# Patient Record
Sex: MALE | Race: WHITE | NOT HISPANIC OR LATINO | Employment: OTHER
[De-identification: names, ages, dates, MRNs, and addresses within clinical notes are randomized per-mention and may not be internally consistent; named-entity substitution may affect disease eponyms.]

---

## 2017-01-05 ENCOUNTER — RECORDS - HEALTHEAST (OUTPATIENT)
Dept: ADMINISTRATIVE | Facility: OTHER | Age: 72
End: 2017-01-05

## 2017-01-31 ENCOUNTER — RECORDS - HEALTHEAST (OUTPATIENT)
Dept: ADMINISTRATIVE | Facility: OTHER | Age: 72
End: 2017-01-31

## 2017-02-01 ENCOUNTER — RECORDS - HEALTHEAST (OUTPATIENT)
Dept: ADMINISTRATIVE | Facility: OTHER | Age: 72
End: 2017-02-01

## 2017-02-14 ENCOUNTER — RECORDS - HEALTHEAST (OUTPATIENT)
Dept: ADMINISTRATIVE | Facility: OTHER | Age: 72
End: 2017-02-14

## 2017-03-01 ENCOUNTER — RECORDS - HEALTHEAST (OUTPATIENT)
Dept: ADMINISTRATIVE | Facility: OTHER | Age: 72
End: 2017-03-01

## 2017-04-12 ENCOUNTER — RECORDS - HEALTHEAST (OUTPATIENT)
Dept: ADMINISTRATIVE | Facility: OTHER | Age: 72
End: 2017-04-12

## 2017-05-23 ENCOUNTER — RECORDS - HEALTHEAST (OUTPATIENT)
Dept: GENERAL RADIOLOGY | Facility: CLINIC | Age: 72
End: 2017-05-23

## 2017-05-23 ENCOUNTER — OFFICE VISIT - HEALTHEAST (OUTPATIENT)
Dept: FAMILY MEDICINE | Facility: CLINIC | Age: 72
End: 2017-05-23

## 2017-05-23 DIAGNOSIS — K58.9 IBS (IRRITABLE BOWEL SYNDROME): ICD-10-CM

## 2017-05-23 DIAGNOSIS — M54.6 THORACIC BACK PAIN: ICD-10-CM

## 2017-05-23 DIAGNOSIS — K64.8 INTERNAL HEMORRHOIDS: ICD-10-CM

## 2017-05-23 DIAGNOSIS — N13.8 BPH WITH URINARY OBSTRUCTION: ICD-10-CM

## 2017-05-23 DIAGNOSIS — N40.1 BPH WITH URINARY OBSTRUCTION: ICD-10-CM

## 2017-05-23 DIAGNOSIS — M54.6 PAIN IN THORACIC SPINE: ICD-10-CM

## 2017-05-31 ENCOUNTER — RECORDS - HEALTHEAST (OUTPATIENT)
Dept: ADMINISTRATIVE | Facility: OTHER | Age: 72
End: 2017-05-31

## 2017-05-31 ENCOUNTER — HOSPITAL ENCOUNTER (OUTPATIENT)
Dept: PHYSICAL MEDICINE AND REHAB | Facility: CLINIC | Age: 72
Discharge: HOME | End: 2017-05-31
Attending: FAMILY MEDICINE

## 2017-05-31 DIAGNOSIS — M54.2 CERVICALGIA: ICD-10-CM

## 2017-05-31 DIAGNOSIS — R20.2 PARESTHESIA AND PAIN OF LEFT EXTREMITY: ICD-10-CM

## 2017-05-31 DIAGNOSIS — M79.609 PARESTHESIA AND PAIN OF LEFT EXTREMITY: ICD-10-CM

## 2017-05-31 DIAGNOSIS — E55.9 VITAMIN D DEFICIENCY: ICD-10-CM

## 2017-05-31 DIAGNOSIS — M79.18 MYOFASCIAL PAIN: ICD-10-CM

## 2017-05-31 DIAGNOSIS — M54.6 THORACIC BACK PAIN: ICD-10-CM

## 2017-05-31 DIAGNOSIS — R53.83 FATIGUE: ICD-10-CM

## 2017-05-31 ASSESSMENT — MIFFLIN-ST. JEOR: SCORE: 1612.81

## 2017-06-02 ENCOUNTER — AMBULATORY - HEALTHEAST (OUTPATIENT)
Dept: LAB | Facility: CLINIC | Age: 72
End: 2017-06-02

## 2017-06-02 DIAGNOSIS — E55.9 VITAMIN D DEFICIENCY: ICD-10-CM

## 2017-06-02 DIAGNOSIS — M54.6 THORACIC BACK PAIN: ICD-10-CM

## 2017-06-02 DIAGNOSIS — R20.2 PARESTHESIA AND PAIN OF LEFT EXTREMITY: ICD-10-CM

## 2017-06-02 DIAGNOSIS — M79.18 MYOFASCIAL PAIN: ICD-10-CM

## 2017-06-02 DIAGNOSIS — M79.609 PARESTHESIA AND PAIN OF LEFT EXTREMITY: ICD-10-CM

## 2017-06-02 DIAGNOSIS — R53.83 FATIGUE: ICD-10-CM

## 2017-06-02 DIAGNOSIS — M54.2 CERVICALGIA: ICD-10-CM

## 2017-06-05 ENCOUNTER — OFFICE VISIT - HEALTHEAST (OUTPATIENT)
Dept: PHYSICAL THERAPY | Facility: CLINIC | Age: 72
End: 2017-06-05

## 2017-06-05 DIAGNOSIS — M62.81 GENERALIZED MUSCLE WEAKNESS: ICD-10-CM

## 2017-06-05 DIAGNOSIS — M54.6 PAIN IN THORACIC SPINE: ICD-10-CM

## 2017-06-05 DIAGNOSIS — M54.2 NECK PAIN ON RIGHT SIDE: ICD-10-CM

## 2017-06-05 DIAGNOSIS — R29.3 ABNORMAL POSTURE: ICD-10-CM

## 2017-06-06 ENCOUNTER — RECORDS - HEALTHEAST (OUTPATIENT)
Dept: ADMINISTRATIVE | Facility: OTHER | Age: 72
End: 2017-06-06

## 2017-06-08 ENCOUNTER — OFFICE VISIT - HEALTHEAST (OUTPATIENT)
Dept: PHYSICAL THERAPY | Facility: CLINIC | Age: 72
End: 2017-06-08

## 2017-06-08 DIAGNOSIS — M54.6 PAIN IN THORACIC SPINE: ICD-10-CM

## 2017-06-08 DIAGNOSIS — M62.81 GENERALIZED MUSCLE WEAKNESS: ICD-10-CM

## 2017-06-08 DIAGNOSIS — M54.2 NECK PAIN ON RIGHT SIDE: ICD-10-CM

## 2017-06-08 DIAGNOSIS — R29.3 ABNORMAL POSTURE: ICD-10-CM

## 2017-06-12 ENCOUNTER — OFFICE VISIT - HEALTHEAST (OUTPATIENT)
Dept: PHYSICAL THERAPY | Facility: CLINIC | Age: 72
End: 2017-06-12

## 2017-06-12 DIAGNOSIS — M54.2 NECK PAIN ON RIGHT SIDE: ICD-10-CM

## 2017-06-12 DIAGNOSIS — M62.81 GENERALIZED MUSCLE WEAKNESS: ICD-10-CM

## 2017-06-12 DIAGNOSIS — M54.6 PAIN IN THORACIC SPINE: ICD-10-CM

## 2017-06-12 DIAGNOSIS — R29.3 ABNORMAL POSTURE: ICD-10-CM

## 2017-06-15 ENCOUNTER — OFFICE VISIT - HEALTHEAST (OUTPATIENT)
Dept: PHYSICAL THERAPY | Facility: CLINIC | Age: 72
End: 2017-06-15

## 2017-06-15 DIAGNOSIS — R29.3 ABNORMAL POSTURE: ICD-10-CM

## 2017-06-15 DIAGNOSIS — M54.6 PAIN IN THORACIC SPINE: ICD-10-CM

## 2017-06-15 DIAGNOSIS — M62.81 GENERALIZED MUSCLE WEAKNESS: ICD-10-CM

## 2017-06-15 DIAGNOSIS — M54.2 NECK PAIN ON RIGHT SIDE: ICD-10-CM

## 2017-06-19 ENCOUNTER — OFFICE VISIT - HEALTHEAST (OUTPATIENT)
Dept: PHYSICAL THERAPY | Facility: CLINIC | Age: 72
End: 2017-06-19

## 2017-06-19 DIAGNOSIS — M62.81 GENERALIZED MUSCLE WEAKNESS: ICD-10-CM

## 2017-06-19 DIAGNOSIS — M54.2 NECK PAIN ON RIGHT SIDE: ICD-10-CM

## 2017-06-19 DIAGNOSIS — R29.3 ABNORMAL POSTURE: ICD-10-CM

## 2017-06-19 DIAGNOSIS — M54.6 PAIN IN THORACIC SPINE: ICD-10-CM

## 2017-06-22 ENCOUNTER — HOSPITAL ENCOUNTER (OUTPATIENT)
Dept: PHYSICAL MEDICINE AND REHAB | Facility: CLINIC | Age: 72
Discharge: HOME | End: 2017-06-22
Attending: PHYSICIAN ASSISTANT

## 2017-06-22 ENCOUNTER — OFFICE VISIT - HEALTHEAST (OUTPATIENT)
Dept: PHYSICAL THERAPY | Facility: CLINIC | Age: 72
End: 2017-06-22

## 2017-06-22 DIAGNOSIS — M54.6 PAIN IN THORACIC SPINE: ICD-10-CM

## 2017-06-22 DIAGNOSIS — M79.18 MYOFASCIAL PAIN: ICD-10-CM

## 2017-06-22 DIAGNOSIS — M54.6 THORACIC BACK PAIN: ICD-10-CM

## 2017-06-22 DIAGNOSIS — M54.2 NECK PAIN ON RIGHT SIDE: ICD-10-CM

## 2017-06-22 DIAGNOSIS — R29.3 ABNORMAL POSTURE: ICD-10-CM

## 2017-06-22 DIAGNOSIS — R53.83 FATIGUE: ICD-10-CM

## 2017-06-22 DIAGNOSIS — M62.81 GENERALIZED MUSCLE WEAKNESS: ICD-10-CM

## 2017-06-22 DIAGNOSIS — E55.9 VITAMIN D DEFICIENCY: ICD-10-CM

## 2017-06-22 ASSESSMENT — MIFFLIN-ST. JEOR: SCORE: 1623.7

## 2017-07-01 ENCOUNTER — HOSPITAL ENCOUNTER (OUTPATIENT)
Dept: MRI IMAGING | Facility: HOSPITAL | Age: 72
Discharge: HOME | End: 2017-07-01
Attending: PHYSICIAN ASSISTANT

## 2017-07-01 DIAGNOSIS — M54.6 THORACIC BACK PAIN: ICD-10-CM

## 2017-07-05 ENCOUNTER — HOSPITAL ENCOUNTER (OUTPATIENT)
Dept: PHYSICAL MEDICINE AND REHAB | Facility: CLINIC | Age: 72
Discharge: HOME | End: 2017-07-05
Attending: PHYSICIAN ASSISTANT

## 2017-07-05 DIAGNOSIS — M54.2 CERVICALGIA: ICD-10-CM

## 2017-07-05 DIAGNOSIS — R53.83 FATIGUE: ICD-10-CM

## 2017-07-05 DIAGNOSIS — M54.6 THORACIC BACK PAIN: ICD-10-CM

## 2017-07-05 DIAGNOSIS — M79.18 MYOFASCIAL PAIN: ICD-10-CM

## 2017-07-05 ASSESSMENT — MIFFLIN-ST. JEOR: SCORE: 1627.78

## 2017-07-12 ENCOUNTER — RECORDS - HEALTHEAST (OUTPATIENT)
Dept: ADMINISTRATIVE | Facility: OTHER | Age: 72
End: 2017-07-12

## 2017-08-16 ENCOUNTER — OFFICE VISIT - HEALTHEAST (OUTPATIENT)
Dept: FAMILY MEDICINE | Facility: CLINIC | Age: 72
End: 2017-08-16

## 2017-08-16 ENCOUNTER — OFFICE VISIT - HEALTHEAST (OUTPATIENT)
Dept: PODIATRY | Age: 72
End: 2017-08-16

## 2017-08-16 ENCOUNTER — RECORDS - HEALTHEAST (OUTPATIENT)
Dept: GENERAL RADIOLOGY | Facility: CLINIC | Age: 72
End: 2017-08-16

## 2017-08-16 DIAGNOSIS — K21.9 GASTROESOPHAGEAL REFLUX DISEASE WITHOUT ESOPHAGITIS: ICD-10-CM

## 2017-08-16 DIAGNOSIS — N13.8 BPH WITH URINARY OBSTRUCTION: ICD-10-CM

## 2017-08-16 DIAGNOSIS — N40.1 BPH WITH URINARY OBSTRUCTION: ICD-10-CM

## 2017-08-16 DIAGNOSIS — M77.9 ENTHESOPATHY, UNSPECIFIED: ICD-10-CM

## 2017-08-16 DIAGNOSIS — M76.71 PERONEAL TENDONITIS, RIGHT: ICD-10-CM

## 2017-08-16 DIAGNOSIS — M77.50 ANKLE ENTHESOPATHY: ICD-10-CM

## 2017-08-16 DIAGNOSIS — M54.6 CHRONIC THORACIC BACK PAIN, UNSPECIFIED BACK PAIN LATERALITY: ICD-10-CM

## 2017-08-16 DIAGNOSIS — E55.9 VITAMIN D INSUFFICIENCY: ICD-10-CM

## 2017-08-16 DIAGNOSIS — G89.29 CHRONIC THORACIC BACK PAIN, UNSPECIFIED BACK PAIN LATERALITY: ICD-10-CM

## 2017-08-16 DIAGNOSIS — N18.3 CHRONIC KIDNEY DISEASE (CKD), STAGE 3 (MODERATE): ICD-10-CM

## 2017-08-17 ENCOUNTER — COMMUNICATION - HEALTHEAST (OUTPATIENT)
Dept: FAMILY MEDICINE | Facility: CLINIC | Age: 72
End: 2017-08-17

## 2017-08-24 ENCOUNTER — HOSPITAL ENCOUNTER (OUTPATIENT)
Dept: PHYSICAL MEDICINE AND REHAB | Facility: CLINIC | Age: 72
Discharge: HOME | End: 2017-08-24
Attending: PHYSICIAN ASSISTANT

## 2017-08-24 DIAGNOSIS — M54.6 THORACIC BACK PAIN: ICD-10-CM

## 2017-08-24 DIAGNOSIS — M47.814 FACET ARTHROPATHY, THORACIC: ICD-10-CM

## 2017-08-24 DIAGNOSIS — M79.18 MYOFASCIAL PAIN: ICD-10-CM

## 2017-08-24 ASSESSMENT — MIFFLIN-ST. JEOR: SCORE: 1636.85

## 2017-08-25 ENCOUNTER — HOSPITAL ENCOUNTER (OUTPATIENT)
Dept: MRI IMAGING | Facility: HOSPITAL | Age: 72
Discharge: HOME | End: 2017-08-25
Attending: PODIATRIST

## 2017-08-25 DIAGNOSIS — M77.9 ENTHESOPATHY, UNSPECIFIED: ICD-10-CM

## 2017-08-25 DIAGNOSIS — M77.50 ANKLE ENTHESOPATHY: ICD-10-CM

## 2017-08-29 ENCOUNTER — COMMUNICATION - HEALTHEAST (OUTPATIENT)
Dept: PALLIATIVE MEDICINE | Facility: OTHER | Age: 72
End: 2017-08-29

## 2017-08-29 ENCOUNTER — COMMUNICATION - HEALTHEAST (OUTPATIENT)
Dept: FAMILY MEDICINE | Facility: CLINIC | Age: 72
End: 2017-08-29

## 2017-08-30 ENCOUNTER — HOSPITAL ENCOUNTER (OUTPATIENT)
Dept: PALLIATIVE MEDICINE | Facility: OTHER | Age: 72
Discharge: HOME | End: 2017-08-30
Attending: PHYSICIAN ASSISTANT

## 2017-08-30 DIAGNOSIS — M54.6 THORACIC BACK PAIN: ICD-10-CM

## 2017-08-30 DIAGNOSIS — M47.814 FACET ARTHROPATHY, THORACIC: ICD-10-CM

## 2017-08-30 DIAGNOSIS — M79.18 MYOFASCIAL PAIN: ICD-10-CM

## 2017-08-30 DIAGNOSIS — M12.88 OTHER SPECIFIC ARTHROPATHIES, NOT ELSEWHERE CLASSIFIED, OTHER SPECIFIED SITE: ICD-10-CM

## 2017-08-30 ASSESSMENT — MIFFLIN-ST. JEOR: SCORE: 1636.85

## 2017-08-31 ENCOUNTER — COMMUNICATION - HEALTHEAST (OUTPATIENT)
Dept: PALLIATIVE MEDICINE | Facility: OTHER | Age: 72
End: 2017-08-31

## 2017-09-01 ENCOUNTER — RECORDS - HEALTHEAST (OUTPATIENT)
Dept: ADMINISTRATIVE | Facility: OTHER | Age: 72
End: 2017-09-01

## 2017-09-15 ENCOUNTER — COMMUNICATION - HEALTHEAST (OUTPATIENT)
Dept: PHYSICAL MEDICINE AND REHAB | Facility: CLINIC | Age: 72
End: 2017-09-15

## 2017-09-15 DIAGNOSIS — M47.819 FACET ARTHROPATHY OF SPINE: ICD-10-CM

## 2017-09-15 DIAGNOSIS — M79.10 MYALGIA: ICD-10-CM

## 2017-09-15 DIAGNOSIS — G89.29 CHRONIC PAIN: ICD-10-CM

## 2017-09-22 ENCOUNTER — COMMUNICATION - HEALTHEAST (OUTPATIENT)
Dept: PHYSICAL MEDICINE AND REHAB | Facility: CLINIC | Age: 72
End: 2017-09-22

## 2017-10-18 ENCOUNTER — RECORDS - HEALTHEAST (OUTPATIENT)
Dept: ADMINISTRATIVE | Facility: OTHER | Age: 72
End: 2017-10-18

## 2017-11-01 ENCOUNTER — RECORDS - HEALTHEAST (OUTPATIENT)
Dept: ADMINISTRATIVE | Facility: OTHER | Age: 72
End: 2017-11-01

## 2018-08-10 ENCOUNTER — RECORDS - HEALTHEAST (OUTPATIENT)
Dept: ADMINISTRATIVE | Facility: OTHER | Age: 73
End: 2018-08-10

## 2018-08-15 ENCOUNTER — RECORDS - HEALTHEAST (OUTPATIENT)
Dept: ADMINISTRATIVE | Facility: OTHER | Age: 73
End: 2018-08-15

## 2018-08-17 ENCOUNTER — OFFICE VISIT - HEALTHEAST (OUTPATIENT)
Dept: FAMILY MEDICINE | Facility: CLINIC | Age: 73
End: 2018-08-17

## 2018-08-17 ENCOUNTER — COMMUNICATION - HEALTHEAST (OUTPATIENT)
Dept: ADMINISTRATIVE | Facility: CLINIC | Age: 73
End: 2018-08-17

## 2018-08-17 DIAGNOSIS — E04.1 THYROID NODULE: ICD-10-CM

## 2018-08-17 DIAGNOSIS — R63.0 ANOREXIA: ICD-10-CM

## 2018-08-17 DIAGNOSIS — R07.89 POSTERIOR CHEST PAIN: ICD-10-CM

## 2018-08-17 LAB
ALBUMIN SERPL-MCNC: 4.5 G/DL (ref 3.5–5)
ALP SERPL-CCNC: 91 U/L (ref 45–120)
ALT SERPL W P-5'-P-CCNC: 23 U/L (ref 0–45)
ANION GAP SERPL CALCULATED.3IONS-SCNC: 14 MMOL/L (ref 5–18)
AST SERPL W P-5'-P-CCNC: 22 U/L (ref 0–40)
BASOPHILS # BLD AUTO: 0.1 THOU/UL (ref 0–0.2)
BASOPHILS NFR BLD AUTO: 1 % (ref 0–2)
BILIRUB SERPL-MCNC: 1.4 MG/DL (ref 0–1)
BUN SERPL-MCNC: 17 MG/DL (ref 8–28)
CALCIUM SERPL-MCNC: 10 MG/DL (ref 8.5–10.5)
CHLORIDE BLD-SCNC: 96 MMOL/L (ref 98–107)
CO2 SERPL-SCNC: 23 MMOL/L (ref 22–31)
CREAT SERPL-MCNC: 1.26 MG/DL (ref 0.7–1.3)
EOSINOPHIL # BLD AUTO: 0.2 THOU/UL (ref 0–0.4)
EOSINOPHIL NFR BLD AUTO: 2 % (ref 0–6)
ERYTHROCYTE [DISTWIDTH] IN BLOOD BY AUTOMATED COUNT: 13.1 % (ref 11–14.5)
GFR SERPL CREATININE-BSD FRML MDRD: 56 ML/MIN/1.73M2
GLUCOSE BLD-MCNC: 106 MG/DL (ref 70–125)
HCT VFR BLD AUTO: 54.4 % (ref 40–54)
HGB BLD-MCNC: 19.3 G/DL (ref 14–18)
LIPASE SERPL-CCNC: 44 U/L (ref 0–52)
LYMPHOCYTES # BLD AUTO: 1.3 THOU/UL (ref 0.8–4.4)
LYMPHOCYTES NFR BLD AUTO: 13 % (ref 20–40)
MCH RBC QN AUTO: 31.6 PG (ref 27–34)
MCHC RBC AUTO-ENTMCNC: 35.5 G/DL (ref 32–36)
MCV RBC AUTO: 89 FL (ref 80–100)
MONOCYTES # BLD AUTO: 1.3 THOU/UL (ref 0–0.9)
MONOCYTES NFR BLD AUTO: 13 % (ref 2–10)
NEUTROPHILS # BLD AUTO: 6.8 THOU/UL (ref 2–7.7)
NEUTROPHILS NFR BLD AUTO: 71 % (ref 50–70)
PLATELET # BLD AUTO: 226 THOU/UL (ref 140–440)
PMV BLD AUTO: 10 FL (ref 8.5–12.5)
POTASSIUM BLD-SCNC: 4.4 MMOL/L (ref 3.5–5)
PROT SERPL-MCNC: 7.5 G/DL (ref 6–8)
RBC # BLD AUTO: 6.1 MILL/UL (ref 4.4–6.2)
SODIUM SERPL-SCNC: 133 MMOL/L (ref 136–145)
T3 SERPL-MCNC: 65 NG/DL (ref 45–175)
T4 FREE SERPL-MCNC: 1 NG/DL (ref 0.7–1.8)
TROPONIN I SERPL-MCNC: <0.01 NG/ML (ref 0–0.29)
TSH SERPL DL<=0.005 MIU/L-ACNC: 2.3 UIU/ML (ref 0.3–5)
WBC: 9.8 THOU/UL (ref 4–11)

## 2018-08-17 RX ORDER — FAMOTIDINE 10 MG
10 TABLET ORAL EVERY EVENING
Status: SHIPPED | COMMUNITY
Start: 2018-08-17

## 2018-08-20 ENCOUNTER — COMMUNICATION - HEALTHEAST (OUTPATIENT)
Dept: FAMILY MEDICINE | Facility: CLINIC | Age: 73
End: 2018-08-20

## 2018-08-21 ENCOUNTER — HOSPITAL ENCOUNTER (OUTPATIENT)
Dept: ULTRASOUND IMAGING | Facility: CLINIC | Age: 73
Discharge: HOME | End: 2018-08-21
Attending: FAMILY MEDICINE

## 2018-08-21 ENCOUNTER — COMMUNICATION - HEALTHEAST (OUTPATIENT)
Dept: TELEHEALTH | Facility: CLINIC | Age: 73
End: 2018-08-21

## 2018-08-21 DIAGNOSIS — E04.1 THYROID NODULE: ICD-10-CM

## 2018-08-22 ENCOUNTER — AMBULATORY - HEALTHEAST (OUTPATIENT)
Dept: FAMILY MEDICINE | Facility: CLINIC | Age: 73
End: 2018-08-22

## 2018-08-22 DIAGNOSIS — E04.1 THYROID NODULE: ICD-10-CM

## 2018-08-23 ENCOUNTER — COMMUNICATION - HEALTHEAST (OUTPATIENT)
Dept: FAMILY MEDICINE | Facility: CLINIC | Age: 73
End: 2018-08-23

## 2018-08-24 ENCOUNTER — OFFICE VISIT - HEALTHEAST (OUTPATIENT)
Dept: FAMILY MEDICINE | Facility: CLINIC | Age: 73
End: 2018-08-24

## 2018-08-24 DIAGNOSIS — R07.89 POSTERIOR CHEST PAIN: ICD-10-CM

## 2018-08-24 DIAGNOSIS — E04.1 THYROID NODULE: ICD-10-CM

## 2018-08-24 DIAGNOSIS — N28.9 RENAL INSUFFICIENCY: ICD-10-CM

## 2018-08-24 DIAGNOSIS — E78.5 HYPERLIPIDEMIA LDL GOAL <100: ICD-10-CM

## 2018-08-24 DIAGNOSIS — D75.1 POLYCYTHEMIA: ICD-10-CM

## 2018-08-24 DIAGNOSIS — E87.1 HYPONATREMIA: ICD-10-CM

## 2018-08-24 LAB
ANION GAP SERPL CALCULATED.3IONS-SCNC: 12 MMOL/L (ref 5–18)
BUN SERPL-MCNC: 17 MG/DL (ref 8–28)
CALCIUM SERPL-MCNC: 9 MG/DL (ref 8.5–10.5)
CHLORIDE BLD-SCNC: 109 MMOL/L (ref 98–107)
CHOLEST SERPL-MCNC: 228 MG/DL
CK SERPL-CCNC: 63 U/L (ref 30–190)
CO2 SERPL-SCNC: 21 MMOL/L (ref 22–31)
CREAT SERPL-MCNC: 1.22 MG/DL (ref 0.7–1.3)
ERYTHROCYTE [DISTWIDTH] IN BLOOD BY AUTOMATED COUNT: 11.7 % (ref 11–14.5)
FASTING STATUS PATIENT QL REPORTED: YES
GFR SERPL CREATININE-BSD FRML MDRD: 58 ML/MIN/1.73M2
GLUCOSE BLD-MCNC: 90 MG/DL (ref 70–125)
HCT VFR BLD AUTO: 46.1 % (ref 40–54)
HDLC SERPL-MCNC: 41 MG/DL
HGB BLD-MCNC: 15.7 G/DL (ref 14–18)
LDLC SERPL CALC-MCNC: 173 MG/DL
MCH RBC QN AUTO: 31.8 PG (ref 27–34)
MCHC RBC AUTO-ENTMCNC: 34.1 G/DL (ref 32–36)
MCV RBC AUTO: 93 FL (ref 80–100)
PLATELET # BLD AUTO: 252 THOU/UL (ref 140–440)
PMV BLD AUTO: 7 FL (ref 7–10)
POTASSIUM BLD-SCNC: 4.4 MMOL/L (ref 3.5–5)
RBC # BLD AUTO: 4.95 MILL/UL (ref 4.4–6.2)
SODIUM SERPL-SCNC: 142 MMOL/L (ref 136–145)
WBC: 8.1 THOU/UL (ref 4–11)

## 2018-08-26 ENCOUNTER — COMMUNICATION - HEALTHEAST (OUTPATIENT)
Dept: FAMILY MEDICINE | Facility: CLINIC | Age: 73
End: 2018-08-26

## 2018-09-04 ENCOUNTER — HOSPITAL ENCOUNTER (OUTPATIENT)
Dept: ULTRASOUND IMAGING | Facility: CLINIC | Age: 73
Discharge: HOME | End: 2018-09-04
Attending: FAMILY MEDICINE | Admitting: RADIOLOGY

## 2018-09-04 DIAGNOSIS — E04.1 THYROID NODULE: ICD-10-CM

## 2018-09-05 LAB
LAB AP CHARGES (HE HISTORICAL CONVERSION): NORMAL
LAB AP INITIAL CYTO EVAL (HE HISTORICAL CONVERSION): NORMAL
LAB MED GENERAL PATH INTERP (HE HISTORICAL CONVERSION): NORMAL
PATH REPORT.COMMENTS IMP SPEC: NORMAL
PATH REPORT.COMMENTS IMP SPEC: NORMAL
PATH REPORT.FINAL DX SPEC: NORMAL
PATH REPORT.MICROSCOPIC SPEC OTHER STN: NORMAL
PATH REPORT.RELEVANT HX SPEC: NORMAL
SPECIMEN DESCRIPTION: NORMAL

## 2018-09-06 ENCOUNTER — COMMUNICATION - HEALTHEAST (OUTPATIENT)
Dept: FAMILY MEDICINE | Facility: CLINIC | Age: 73
End: 2018-09-06

## 2019-02-26 ENCOUNTER — RECORDS - HEALTHEAST (OUTPATIENT)
Dept: ADMINISTRATIVE | Facility: OTHER | Age: 74
End: 2019-02-26

## 2019-06-12 ENCOUNTER — COMMUNICATION - HEALTHEAST (OUTPATIENT)
Dept: FAMILY MEDICINE | Facility: CLINIC | Age: 74
End: 2019-06-12

## 2019-06-12 ENCOUNTER — OFFICE VISIT - HEALTHEAST (OUTPATIENT)
Dept: FAMILY MEDICINE | Facility: CLINIC | Age: 74
End: 2019-06-12

## 2019-06-12 DIAGNOSIS — N28.9 RENAL INSUFFICIENCY: ICD-10-CM

## 2019-06-12 DIAGNOSIS — D75.1 POLYCYTHEMIA: ICD-10-CM

## 2019-06-12 DIAGNOSIS — Z01.810 PRE-OPERATIVE CARDIOVASCULAR EXAMINATION: ICD-10-CM

## 2019-06-12 DIAGNOSIS — H35.30 MACULAR DEGENERATION (SENILE) OF RETINA: ICD-10-CM

## 2019-06-12 DIAGNOSIS — K21.9 GASTROESOPHAGEAL REFLUX DISEASE WITHOUT ESOPHAGITIS: ICD-10-CM

## 2019-06-12 DIAGNOSIS — E78.5 HYPERLIPIDEMIA LDL GOAL <100: ICD-10-CM

## 2019-06-12 DIAGNOSIS — H54.3 DECREASED VISION IN BOTH EYES: ICD-10-CM

## 2019-06-12 DIAGNOSIS — E04.1 THYROID NODULE: ICD-10-CM

## 2019-06-12 LAB
ANION GAP SERPL CALCULATED.3IONS-SCNC: 9 MMOL/L (ref 5–18)
BUN SERPL-MCNC: 19 MG/DL (ref 8–28)
CALCIUM SERPL-MCNC: 9.7 MG/DL (ref 8.5–10.5)
CHLORIDE BLD-SCNC: 108 MMOL/L (ref 98–107)
CO2 SERPL-SCNC: 26 MMOL/L (ref 22–31)
CREAT SERPL-MCNC: 1.26 MG/DL (ref 0.7–1.3)
ERYTHROCYTE [DISTWIDTH] IN BLOOD BY AUTOMATED COUNT: 11.8 % (ref 11–14.5)
GFR SERPL CREATININE-BSD FRML MDRD: 56 ML/MIN/1.73M2
GLUCOSE BLD-MCNC: 90 MG/DL (ref 70–125)
HCT VFR BLD AUTO: 52.5 % (ref 40–54)
HGB BLD-MCNC: 17.3 G/DL (ref 14–18)
MCH RBC QN AUTO: 32 PG (ref 27–34)
MCHC RBC AUTO-ENTMCNC: 33 G/DL (ref 32–36)
MCV RBC AUTO: 97 FL (ref 80–100)
PLATELET # BLD AUTO: 248 THOU/UL (ref 140–440)
PMV BLD AUTO: 7.9 FL (ref 7–10)
POTASSIUM BLD-SCNC: 4.4 MMOL/L (ref 3.5–5)
RBC # BLD AUTO: 5.41 MILL/UL (ref 4.4–6.2)
SODIUM SERPL-SCNC: 143 MMOL/L (ref 136–145)
WBC: 8.9 THOU/UL (ref 4–11)

## 2019-06-12 ASSESSMENT — MIFFLIN-ST. JEOR: SCORE: 1626.65

## 2019-06-13 ENCOUNTER — COMMUNICATION - HEALTHEAST (OUTPATIENT)
Dept: FAMILY MEDICINE | Facility: CLINIC | Age: 74
End: 2019-06-13

## 2019-06-13 LAB
ATRIAL RATE - MUSE: 78 BPM
DIASTOLIC BLOOD PRESSURE - MUSE: NORMAL MMHG
INTERPRETATION ECG - MUSE: NORMAL
P AXIS - MUSE: 43 DEGREES
PR INTERVAL - MUSE: 168 MS
QRS DURATION - MUSE: 80 MS
QT - MUSE: 384 MS
QTC - MUSE: 437 MS
R AXIS - MUSE: -22 DEGREES
SYSTOLIC BLOOD PRESSURE - MUSE: NORMAL MMHG
T AXIS - MUSE: 19 DEGREES
VENTRICULAR RATE- MUSE: 78 BPM

## 2019-08-23 ENCOUNTER — RECORDS - HEALTHEAST (OUTPATIENT)
Dept: ADMINISTRATIVE | Facility: OTHER | Age: 74
End: 2019-08-23

## 2019-09-11 ENCOUNTER — OFFICE VISIT - HEALTHEAST (OUTPATIENT)
Dept: FAMILY MEDICINE | Facility: CLINIC | Age: 74
End: 2019-09-11

## 2019-09-11 DIAGNOSIS — H35.30 MACULAR DEGENERATION (SENILE) OF RETINA: ICD-10-CM

## 2019-09-11 DIAGNOSIS — N28.9 RENAL INSUFFICIENCY: ICD-10-CM

## 2019-09-11 DIAGNOSIS — K21.9 GASTROESOPHAGEAL REFLUX DISEASE WITHOUT ESOPHAGITIS: ICD-10-CM

## 2019-09-11 DIAGNOSIS — E04.1 THYROID NODULE: ICD-10-CM

## 2019-09-11 DIAGNOSIS — D75.1 POLYCYTHEMIA: ICD-10-CM

## 2019-09-11 DIAGNOSIS — Z00.01 ENCOUNTER FOR GENERAL ADULT MEDICAL EXAMINATION WITH ABNORMAL FINDINGS: ICD-10-CM

## 2019-09-11 DIAGNOSIS — E78.5 HYPERLIPIDEMIA LDL GOAL <100: ICD-10-CM

## 2019-09-11 LAB
ALBUMIN SERPL-MCNC: 4.2 G/DL (ref 3.5–5)
ALP SERPL-CCNC: 86 U/L (ref 45–120)
ALT SERPL W P-5'-P-CCNC: 18 U/L (ref 0–45)
ANION GAP SERPL CALCULATED.3IONS-SCNC: 11 MMOL/L (ref 5–18)
AST SERPL W P-5'-P-CCNC: 23 U/L (ref 0–40)
BILIRUB SERPL-MCNC: 0.8 MG/DL (ref 0–1)
BUN SERPL-MCNC: 13 MG/DL (ref 8–28)
CALCIUM SERPL-MCNC: 9.8 MG/DL (ref 8.5–10.5)
CHLORIDE BLD-SCNC: 107 MMOL/L (ref 98–107)
CHOLEST SERPL-MCNC: 265 MG/DL
CO2 SERPL-SCNC: 25 MMOL/L (ref 22–31)
CREAT SERPL-MCNC: 1.29 MG/DL (ref 0.7–1.3)
ERYTHROCYTE [DISTWIDTH] IN BLOOD BY AUTOMATED COUNT: 12.1 % (ref 11–14.5)
FASTING STATUS PATIENT QL REPORTED: YES
GFR SERPL CREATININE-BSD FRML MDRD: 54 ML/MIN/1.73M2
GLUCOSE BLD-MCNC: 98 MG/DL (ref 70–125)
HCT VFR BLD AUTO: 50.8 % (ref 40–54)
HDLC SERPL-MCNC: 53 MG/DL
HGB BLD-MCNC: 17.5 G/DL (ref 14–18)
LDLC SERPL CALC-MCNC: 182 MG/DL
MCH RBC QN AUTO: 32.8 PG (ref 27–34)
MCHC RBC AUTO-ENTMCNC: 34.4 G/DL (ref 32–36)
MCV RBC AUTO: 95 FL (ref 80–100)
PLATELET # BLD AUTO: 225 THOU/UL (ref 140–440)
PMV BLD AUTO: 7.7 FL (ref 7–10)
POTASSIUM BLD-SCNC: 4.1 MMOL/L (ref 3.5–5)
PROT SERPL-MCNC: 7.1 G/DL (ref 6–8)
RBC # BLD AUTO: 5.33 MILL/UL (ref 4.4–6.2)
SODIUM SERPL-SCNC: 143 MMOL/L (ref 136–145)
TRIGL SERPL-MCNC: 149 MG/DL
WBC: 8.5 THOU/UL (ref 4–11)

## 2019-09-11 ASSESSMENT — MIFFLIN-ST. JEOR: SCORE: 1627.78

## 2019-09-15 ENCOUNTER — COMMUNICATION - HEALTHEAST (OUTPATIENT)
Dept: FAMILY MEDICINE | Facility: CLINIC | Age: 74
End: 2019-09-15

## 2019-11-22 ENCOUNTER — COMMUNICATION - HEALTHEAST (OUTPATIENT)
Dept: FAMILY MEDICINE | Facility: CLINIC | Age: 74
End: 2019-11-22

## 2020-05-13 ENCOUNTER — RECORDS - HEALTHEAST (OUTPATIENT)
Dept: ADMINISTRATIVE | Facility: OTHER | Age: 75
End: 2020-05-13

## 2020-08-04 ENCOUNTER — COMMUNICATION - HEALTHEAST (OUTPATIENT)
Dept: TELEHEALTH | Facility: CLINIC | Age: 75
End: 2020-08-04

## 2020-08-04 ENCOUNTER — OFFICE VISIT - HEALTHEAST (OUTPATIENT)
Dept: FAMILY MEDICINE | Facility: CLINIC | Age: 75
End: 2020-08-04

## 2020-08-04 DIAGNOSIS — L70.0 BLACKHEAD: ICD-10-CM

## 2020-08-04 DIAGNOSIS — L82.1 SEBORRHEIC KERATOSES: ICD-10-CM

## 2020-08-04 DIAGNOSIS — N40.1 BPH WITH URINARY OBSTRUCTION: ICD-10-CM

## 2020-08-04 DIAGNOSIS — N13.8 BPH WITH URINARY OBSTRUCTION: ICD-10-CM

## 2020-08-04 DIAGNOSIS — L81.9 ATYPICAL PIGMENTED SKIN LESION: ICD-10-CM

## 2020-08-04 DIAGNOSIS — R20.2 ARM PARESTHESIA, LEFT: ICD-10-CM

## 2020-08-04 ASSESSMENT — MIFFLIN-ST. JEOR: SCORE: 1637.53

## 2020-08-12 ENCOUNTER — RECORDS - HEALTHEAST (OUTPATIENT)
Dept: ADMINISTRATIVE | Facility: OTHER | Age: 75
End: 2020-08-12

## 2020-08-18 ENCOUNTER — HOSPITAL ENCOUNTER (OUTPATIENT)
Dept: MRI IMAGING | Facility: HOSPITAL | Age: 75
Discharge: HOME | End: 2020-08-18
Attending: FAMILY MEDICINE

## 2020-08-18 DIAGNOSIS — R20.2 ARM PARESTHESIA, LEFT: ICD-10-CM

## 2020-08-19 ENCOUNTER — AMBULATORY - HEALTHEAST (OUTPATIENT)
Dept: FAMILY MEDICINE | Facility: CLINIC | Age: 75
End: 2020-08-19

## 2020-08-19 DIAGNOSIS — M48.02 NEURAL FORAMINAL STENOSIS OF CERVICAL SPINE: ICD-10-CM

## 2020-08-19 DIAGNOSIS — R20.2 ARM PARESTHESIA, LEFT: ICD-10-CM

## 2020-08-20 ENCOUNTER — COMMUNICATION - HEALTHEAST (OUTPATIENT)
Dept: FAMILY MEDICINE | Facility: CLINIC | Age: 75
End: 2020-08-20

## 2020-08-24 ENCOUNTER — COMMUNICATION - HEALTHEAST (OUTPATIENT)
Dept: SCHEDULING | Facility: CLINIC | Age: 75
End: 2020-08-24

## 2020-08-25 ENCOUNTER — AMBULATORY - HEALTHEAST (OUTPATIENT)
Dept: CARE COORDINATION | Facility: CLINIC | Age: 75
End: 2020-08-25

## 2020-08-25 DIAGNOSIS — A41.9 SEPSIS, DUE TO UNSPECIFIED ORGANISM, UNSPECIFIED WHETHER ACUTE ORGAN DYSFUNCTION PRESENT (H): ICD-10-CM

## 2020-08-26 ENCOUNTER — AMBULATORY - HEALTHEAST (OUTPATIENT)
Dept: SURGERY | Facility: HOSPITAL | Age: 75
End: 2020-08-26

## 2020-08-26 DIAGNOSIS — Z11.59 ENCOUNTER FOR SCREENING FOR OTHER VIRAL DISEASES: ICD-10-CM

## 2020-08-31 ENCOUNTER — COMMUNICATION - HEALTHEAST (OUTPATIENT)
Dept: FAMILY MEDICINE | Facility: CLINIC | Age: 75
End: 2020-08-31

## 2020-08-31 ENCOUNTER — COMMUNICATION - HEALTHEAST (OUTPATIENT)
Dept: NURSING | Facility: CLINIC | Age: 75
End: 2020-08-31

## 2020-09-08 ENCOUNTER — COMMUNICATION - HEALTHEAST (OUTPATIENT)
Dept: CARE COORDINATION | Facility: CLINIC | Age: 75
End: 2020-09-08

## 2020-09-08 ASSESSMENT — ACTIVITIES OF DAILY LIVING (ADL): DEPENDENT_IADLS:: INDEPENDENT

## 2020-09-10 ENCOUNTER — OFFICE VISIT - HEALTHEAST (OUTPATIENT)
Dept: FAMILY MEDICINE | Facility: CLINIC | Age: 75
End: 2020-09-10

## 2020-09-10 ENCOUNTER — COMMUNICATION - HEALTHEAST (OUTPATIENT)
Dept: FAMILY MEDICINE | Facility: CLINIC | Age: 75
End: 2020-09-10

## 2020-09-10 DIAGNOSIS — N30.00 ACUTE CYSTITIS WITHOUT HEMATURIA: ICD-10-CM

## 2020-09-10 DIAGNOSIS — Z01.818 PREOP GENERAL PHYSICAL EXAM: ICD-10-CM

## 2020-09-10 DIAGNOSIS — N13.8 BPH WITH URINARY OBSTRUCTION: ICD-10-CM

## 2020-09-10 DIAGNOSIS — K21.9 GASTROESOPHAGEAL REFLUX DISEASE WITHOUT ESOPHAGITIS: ICD-10-CM

## 2020-09-10 DIAGNOSIS — N40.1 BPH WITH URINARY OBSTRUCTION: ICD-10-CM

## 2020-09-10 LAB — HGB BLD-MCNC: 16.7 G/DL (ref 14–18)

## 2020-09-11 ENCOUNTER — HOSPITAL ENCOUNTER (OUTPATIENT)
Dept: PHYSICAL MEDICINE AND REHAB | Facility: CLINIC | Age: 75
Discharge: HOME | End: 2020-09-11
Attending: FAMILY MEDICINE

## 2020-09-11 ENCOUNTER — AMBULATORY - HEALTHEAST (OUTPATIENT)
Dept: LAB | Facility: CLINIC | Age: 75
End: 2020-09-11

## 2020-09-11 DIAGNOSIS — G56.03 BILATERAL CARPAL TUNNEL SYNDROME: ICD-10-CM

## 2020-09-11 DIAGNOSIS — Z11.59 ENCOUNTER FOR SCREENING FOR OTHER VIRAL DISEASES: ICD-10-CM

## 2020-09-11 DIAGNOSIS — R20.2 PARESTHESIAS: ICD-10-CM

## 2020-09-11 ASSESSMENT — MIFFLIN-ST. JEOR: SCORE: 1617.57

## 2020-09-13 ENCOUNTER — COMMUNICATION - HEALTHEAST (OUTPATIENT)
Dept: SCHEDULING | Facility: CLINIC | Age: 75
End: 2020-09-13

## 2020-09-15 ENCOUNTER — SURGERY - HEALTHEAST (OUTPATIENT)
Dept: SURGERY | Facility: HOSPITAL | Age: 75
End: 2020-09-15

## 2020-09-15 ENCOUNTER — ANESTHESIA - HEALTHEAST (OUTPATIENT)
Dept: SURGERY | Facility: HOSPITAL | Age: 75
End: 2020-09-15

## 2020-09-15 ASSESSMENT — MIFFLIN-ST. JEOR
SCORE: 1617.57
SCORE: 1620.75

## 2020-09-24 ENCOUNTER — RECORDS - HEALTHEAST (OUTPATIENT)
Dept: ADMINISTRATIVE | Facility: OTHER | Age: 75
End: 2020-09-24

## 2020-09-30 ENCOUNTER — COMMUNICATION - HEALTHEAST (OUTPATIENT)
Dept: NURSING | Facility: CLINIC | Age: 75
End: 2020-09-30

## 2020-10-08 ENCOUNTER — COMMUNICATION - HEALTHEAST (OUTPATIENT)
Dept: CARE COORDINATION | Facility: CLINIC | Age: 75
End: 2020-10-08

## 2020-10-08 ENCOUNTER — RECORDS - HEALTHEAST (OUTPATIENT)
Dept: ADMINISTRATIVE | Facility: OTHER | Age: 75
End: 2020-10-08

## 2020-10-14 ENCOUNTER — COMMUNICATION - HEALTHEAST (OUTPATIENT)
Dept: NURSING | Facility: CLINIC | Age: 75
End: 2020-10-14

## 2020-10-20 ENCOUNTER — COMMUNICATION - HEALTHEAST (OUTPATIENT)
Dept: CARE COORDINATION | Facility: CLINIC | Age: 75
End: 2020-10-20

## 2020-10-21 ENCOUNTER — COMMUNICATION - HEALTHEAST (OUTPATIENT)
Dept: SCHEDULING | Facility: CLINIC | Age: 75
End: 2020-10-21

## 2020-10-23 ENCOUNTER — RECORDS - HEALTHEAST (OUTPATIENT)
Dept: ADMINISTRATIVE | Facility: OTHER | Age: 75
End: 2020-10-23

## 2020-11-03 ENCOUNTER — COMMUNICATION - HEALTHEAST (OUTPATIENT)
Dept: NURSING | Facility: CLINIC | Age: 75
End: 2020-11-03

## 2020-11-05 ENCOUNTER — COMMUNICATION - HEALTHEAST (OUTPATIENT)
Dept: CARE COORDINATION | Facility: CLINIC | Age: 75
End: 2020-11-05

## 2020-11-17 ENCOUNTER — COMMUNICATION - HEALTHEAST (OUTPATIENT)
Dept: NURSING | Facility: CLINIC | Age: 75
End: 2020-11-17

## 2020-11-24 ENCOUNTER — COMMUNICATION - HEALTHEAST (OUTPATIENT)
Dept: CARE COORDINATION | Facility: CLINIC | Age: 75
End: 2020-11-24

## 2020-11-24 DIAGNOSIS — A41.9 SEPSIS, DUE TO UNSPECIFIED ORGANISM, UNSPECIFIED WHETHER ACUTE ORGAN DYSFUNCTION PRESENT (H): ICD-10-CM

## 2020-11-25 ENCOUNTER — COMMUNICATION - HEALTHEAST (OUTPATIENT)
Dept: NURSING | Facility: CLINIC | Age: 75
End: 2020-11-25

## 2021-03-08 ENCOUNTER — RECORDS - HEALTHEAST (OUTPATIENT)
Dept: ADMINISTRATIVE | Facility: OTHER | Age: 76
End: 2021-03-08

## 2021-03-19 ENCOUNTER — RECORDS - HEALTHEAST (OUTPATIENT)
Dept: ADMINISTRATIVE | Facility: OTHER | Age: 76
End: 2021-03-19

## 2021-03-20 ENCOUNTER — RECORDS - HEALTHEAST (OUTPATIENT)
Dept: ADMINISTRATIVE | Facility: OTHER | Age: 76
End: 2021-03-20

## 2021-03-30 ENCOUNTER — RECORDS - HEALTHEAST (OUTPATIENT)
Dept: ADMINISTRATIVE | Facility: OTHER | Age: 76
End: 2021-03-30

## 2021-04-12 ENCOUNTER — RECORDS - HEALTHEAST (OUTPATIENT)
Dept: ADMINISTRATIVE | Facility: OTHER | Age: 76
End: 2021-04-12

## 2021-04-28 ENCOUNTER — RECORDS - HEALTHEAST (OUTPATIENT)
Dept: ADMINISTRATIVE | Facility: OTHER | Age: 76
End: 2021-04-28

## 2021-05-10 ENCOUNTER — RECORDS - HEALTHEAST (OUTPATIENT)
Dept: ADMINISTRATIVE | Facility: OTHER | Age: 76
End: 2021-05-10

## 2021-05-25 ENCOUNTER — OFFICE VISIT - HEALTHEAST (OUTPATIENT)
Dept: FAMILY MEDICINE | Facility: CLINIC | Age: 76
End: 2021-05-25

## 2021-05-25 DIAGNOSIS — R61 NIGHT SWEATS: ICD-10-CM

## 2021-05-25 DIAGNOSIS — N18.31 STAGE 3A CHRONIC KIDNEY DISEASE (H): ICD-10-CM

## 2021-05-25 DIAGNOSIS — K80.50 CHOLEDOCHOLITHIASIS: ICD-10-CM

## 2021-05-25 LAB
ALBUMIN SERPL-MCNC: 4.1 G/DL (ref 3.5–5)
ALP SERPL-CCNC: 121 U/L (ref 45–120)
ALT SERPL W P-5'-P-CCNC: 22 U/L (ref 0–45)
ANION GAP SERPL CALCULATED.3IONS-SCNC: 13 MMOL/L (ref 5–18)
AST SERPL W P-5'-P-CCNC: 20 U/L (ref 0–40)
BASOPHILS # BLD AUTO: 0.1 THOU/UL (ref 0–0.2)
BASOPHILS NFR BLD AUTO: 1 % (ref 0–2)
BILIRUB SERPL-MCNC: 0.6 MG/DL (ref 0–1)
BUN SERPL-MCNC: 13 MG/DL (ref 8–28)
C REACTIVE PROTEIN LHE: 0.5 MG/DL (ref 0–0.8)
CALCIUM SERPL-MCNC: 9.4 MG/DL (ref 8.5–10.5)
CHLORIDE BLD-SCNC: 105 MMOL/L (ref 98–107)
CO2 SERPL-SCNC: 23 MMOL/L (ref 22–31)
CREAT SERPL-MCNC: 1.18 MG/DL (ref 0.7–1.3)
EOSINOPHIL # BLD AUTO: 0.2 THOU/UL (ref 0–0.4)
EOSINOPHIL NFR BLD AUTO: 3 % (ref 0–6)
ERYTHROCYTE [DISTWIDTH] IN BLOOD BY AUTOMATED COUNT: 13.3 % (ref 11–14.5)
ERYTHROCYTE [SEDIMENTATION RATE] IN BLOOD BY WESTERGREN METHOD: 7 MM/HR (ref 0–15)
GFR SERPL CREATININE-BSD FRML MDRD: 60 ML/MIN/1.73M2
GLUCOSE BLD-MCNC: 91 MG/DL (ref 70–125)
HCT VFR BLD AUTO: 47.4 % (ref 40–54)
HGB BLD-MCNC: 16.1 G/DL (ref 14–18)
IMM GRANULOCYTES # BLD: 0 THOU/UL
IMM GRANULOCYTES NFR BLD: 1 %
LYMPHOCYTES # BLD AUTO: 1.9 THOU/UL (ref 0.8–4.4)
LYMPHOCYTES NFR BLD AUTO: 23 % (ref 20–40)
MCH RBC QN AUTO: 31.1 PG (ref 27–34)
MCHC RBC AUTO-ENTMCNC: 34 G/DL (ref 32–36)
MCV RBC AUTO: 92 FL (ref 80–100)
MONOCYTES # BLD AUTO: 1 THOU/UL (ref 0–0.9)
MONOCYTES NFR BLD AUTO: 12 % (ref 2–10)
NEUTROPHILS # BLD AUTO: 4.9 THOU/UL (ref 2–7.7)
NEUTROPHILS NFR BLD AUTO: 60 % (ref 50–70)
PLATELET # BLD AUTO: 241 THOU/UL (ref 140–440)
PMV BLD AUTO: 9.2 FL (ref 7–10)
POTASSIUM BLD-SCNC: 4.4 MMOL/L (ref 3.5–5)
PROT SERPL-MCNC: 7.1 G/DL (ref 6–8)
RBC # BLD AUTO: 5.18 MILL/UL (ref 4.4–6.2)
SODIUM SERPL-SCNC: 141 MMOL/L (ref 136–145)
TSH SERPL DL<=0.005 MIU/L-ACNC: 0.41 UIU/ML (ref 0.3–5)
WBC: 8.1 THOU/UL (ref 4–11)

## 2021-05-26 ENCOUNTER — RECORDS - HEALTHEAST (OUTPATIENT)
Dept: ADMINISTRATIVE | Facility: OTHER | Age: 76
End: 2021-05-26

## 2021-05-26 ENCOUNTER — COMMUNICATION - HEALTHEAST (OUTPATIENT)
Dept: INFECTIOUS DISEASES | Facility: CLINIC | Age: 76
End: 2021-05-26

## 2021-05-26 ENCOUNTER — COMMUNICATION - HEALTHEAST (OUTPATIENT)
Dept: FAMILY MEDICINE | Facility: CLINIC | Age: 76
End: 2021-05-26

## 2021-05-29 NOTE — TELEPHONE ENCOUNTER
Name of form/paperwork: pre op form  Have you been seen for this request: yes    When is form needed:         Fax form:  Fax Number:  On form     In Dr. Bermudez mailbox 6/12

## 2021-05-29 NOTE — PROGRESS NOTES
Preoperative Exam    Scheduled Procedure: bilateral laser - remove scar tissue  Surgery Date:  6/24/19  Surgery Location: [facility]    Surgeon:  Dr Morgan    Assessment/Plan:     1. Pre-operative cardiovascular examination  Preop cardiovascular examination completed.  No contraindication identified to scheduled procedure (presumed YAG capsulotomy).  - Electrocardiogram Perform and Read    2. Decreased vision in both eyes  Schedule procedure as noted above.  Status post bilateral cataract repair describes several years ago.    3. Hyperlipidemia LDL goal <100  History of hyperlipidemia.  He has not tolerated statin therapy.  Nonfasting today.  Will reassess at annual wellness visit in 3 months.    4. Renal insufficiency  Ensure stable renal function with history of mild renal insufficiency.  - Basic Metabolic Panel    5. Gastroesophageal reflux disease without esophagitis  Famotidine 10 mg typically once daily.  No other significant breakthrough symptoms.    6. Thyroid nodule  History of thyroid nodule.  Prior fine-needle aspiration without concern identified with benign colloid nodule reported.    7. Osteoarthritis Of The Knee  Bilateral knee osteoarthritis history.    8. Macular degeneration (senile) of retina  Macular degeneration described wet on left, dry on right and receives left intra-ocular injections.    9. Polycythemia  CBC with history of polycythemia.  - HM2(CBC w/o Differential)        Surgical Procedure Risk: Low (reported cardiac risk generally < 1%)  Have you had prior anesthesia?: Yes  Have you or any family members had a previous anesthesia reaction:  No  Do you or any family members have a history of a clotting or bleeding disorder?: No  Cardiac Risk Assessment: no increased risk for major cardiac complications    Patient approved for surgery with general or local anesthesia.    Please Note:  Patient is taking medications for Chronic Pain.  Patient has an implanted device.  Device Type: 2  total knee replacement, screws in right ankle      Device Vendor:       Functional Status: Independent  Patient plans to recover at home with family.     Subjective:      Cali Gama is a 74 y.o. male who presents for a preoperative consultation.  Decreased visual acuity.  Prior history of cataracts.  These were treated several years ago.  Patient with hyperlipidemia.  Does not tolerate statin therapy.  Mild renal insufficiency.  Uses famotidine as needed for reflux.  Prior thyroid nodule with biopsy showing benign findings.  Osteoarthritis bilateral knees.  Lower back pain improved with regular exercise.  Macular degeneration history.  Receives intraocular injection of left eye by eye specialist.  Denies chest pain or shortness of breath.  Denies other recent illness.  No palpitations.    All other systems reviewed and are negative, other than those listed in the HPI.    Pertinent History  Do you have difficulty breathing or chest pain after walking up a flight of stairs: No  History of obstructive sleep apnea: No  Steroid use in the last 6 months: No  Frequent Aspirin/NSAID use: No  Prior Blood Transfusion: No  Prior Blood Transfusion Reaction: No  If for some reason prior to, during or after the procedure, if it is medically indicated, would you be willing to have a blood transfusion?:  There is no transfusion refusal.    Current Outpatient Medications   Medication Sig Dispense Refill     acetaminophen (TYLENOL) 500 MG tablet Take 500 mg by mouth every 6 (six) hours as needed for pain.       diphenhydrAMINE (BENADRYL) 25 mg capsule Take 25-50 mg by mouth at bedtime as needed for itching.       famotidine (FOR PEPCID) 10 MG tablet Take 10 mg by mouth 2 (two) times a day.       ibuprofen (ADVIL,MOTRIN) 200 MG tablet Take 400 mg by mouth every 6 (six) hours as needed for pain.       diazePAM (VALIUM) 5 MG tablet Take 1 tablet (5 mg total) by mouth 2 (two) times a day. 4 tablet 0     HYDROcodone-acetaminophen 5-325  mg per tablet Take 1-2 tablets by mouth every 6 (six) hours as needed for pain. 30 tablet 0     No current facility-administered medications for this visit.         Allergies   Allergen Reactions     Morphine        Patient Active Problem List   Diagnosis     Irritable Bowel Syndrome     BPH with urinary obstruction     Hypertrophy Of Breast     Osteoarthritis Of The Knee     Joint Pain, Localized In The Left Shoulder     Blood Pressure Isolated Elevated     CKD (chronic kidney disease), stage 2 (mild)     GERD (gastroesophageal reflux disease)     Thyroid nodule       Past Medical History:   Diagnosis Date     BPH with urinary obstruction      Chronic kidney disease        Past Surgical History:   Procedure Laterality Date     CHOLECYSTECTOMY       JOINT REPLACEMENT         Social History     Socioeconomic History     Marital status:      Spouse name: Not on file     Number of children: Not on file     Years of education: Not on file     Highest education level: Not on file   Occupational History     Not on file   Social Needs     Financial resource strain: Not on file     Food insecurity:     Worry: Not on file     Inability: Not on file     Transportation needs:     Medical: Not on file     Non-medical: Not on file   Tobacco Use     Smoking status: Former Smoker     Smokeless tobacco: Never Used   Substance and Sexual Activity     Alcohol use: Yes     Alcohol/week: 1.2 oz     Types: 2 Standard drinks or equivalent per week     Drug use: No     Sexual activity: Not on file   Lifestyle     Physical activity:     Days per week: Not on file     Minutes per session: Not on file     Stress: Not on file   Relationships     Social connections:     Talks on phone: Not on file     Gets together: Not on file     Attends Restoration service: Not on file     Active member of club or organization: Not on file     Attends meetings of clubs or organizations: Not on file     Relationship status: Not on file     Intimate  "partner violence:     Fear of current or ex partner: Not on file     Emotionally abused: Not on file     Physically abused: Not on file     Forced sexual activity: Not on file   Other Topics Concern     Not on file   Social History Narrative     Not on file       Patient Care Team:  Urban Barker MD as PCP - General (Family Medicine)          Objective:     Vitals:    06/12/19 1449 06/12/19 1454 06/12/19 1536   BP: 140/80 140/90 136/84   Pulse: 83     SpO2: 96%     Weight: 202 lb (91.6 kg)     Height: 5' 9\" (1.753 m)           Physical Exam:  Physical Exam     General Appearance:    Alert, cooperative, no distress, appears stated age.  Overweight.   Head:    Normocephalic, without obvious abnormality, atraumatic   Eyes:    PERRL, conjunctiva/corneas clear, EOM's intact, fundi     benign, both eyes        Ears:    Normal TM's and external ear canals, both ears   Nose:   Nares normal, septum midline, mucosa normal, no drainage    or sinus tenderness   Throat:   Lips, mucosa, and tongue normal; teeth and gums normal   Neck:   Supple, symmetrical, trachea midline, no adenopathy;        thyroid:  No enlargement/tenderness/nodules; no carotid    bruit or JVD   Back:     Symmetric, no curvature, ROM normal, no CVA tenderness   Lungs:     Clear to auscultation bilaterally, respirations unlabored   Chest wall:    No tenderness or deformity   Heart:    Regular rate and rhythm, S1 and S2 normal, no murmur, rub   or gallop   Abdomen:     Soft, non-tender, bowel sounds active all four quadrants,     no masses, no organomegaly.     Genitalia:    deferred   Rectal:    deferred   Extremities:   Extremities normal, atraumatic, no cyanosis or edema   Pulses:   2+ and symmetric all extremities   Skin:   Skin color, texture, turgor normal, no rashes or lesions   Lymph nodes:   Cervical, supraclavicular, and axillary nodes normal   Neurologic:   CNII-XII intact. Normal strength, sensation and reflexes       throughout        There " are no Patient Instructions on file for this visit.    EKG:  Normal sinus rhythm   Inferior infarct , age undetermined   Abnormal ECG   When compared with ECG of 15-AUG-2018 20:42,   No significant change was found     Labs:  Recent Results (from the past 24 hour(s))   Electrocardiogram Perform and Read    Collection Time: 06/12/19  2:57 PM   Result Value Ref Range    SYSTOLIC BLOOD PRESSURE  mmHg    DIASTOLIC BLOOD PRESSURE  mmHg    VENTRICULAR RATE 78 BPM    ATRIAL RATE 78 BPM    P-R INTERVAL 168 ms    QRS DURATION 80 ms    Q-T INTERVAL 384 ms    QTC CALCULATION (BEZET) 437 ms    P Axis 43 degrees    R AXIS -22 degrees    T AXIS 19 degrees    MUSE DIAGNOSIS       Normal sinus rhythm  Inferior infarct , age undetermined  Abnormal ECG  When compared with ECG of 15-AUG-2018 20:42,  No significant change was found         Immunization History   Administered Date(s) Administered     Pneumo Conj 13-V (2010&after) 07/29/2015     Tdap 01/01/2013           1.  FINE-NEEDLE ASPIRATION RIGHT THYROID NODULE  2.  ULTRASOUND GUIDANCE  9/4/2018 1:52 PM     INDICATION: Nontoxic single thyroid nodule     PROCEDURE: Informed consent obtained. Time out performed. The site was prepped and draped in sterile fashion. 6 mL of 1% lidocaine was infused into the local soft tissues. Under direct ultrasound guidance, multiple fine-needle aspirates of the nodule   were obtained. The tissue was felt to be adequate by pathology.     RADIOLOGIC SUPERVISION AND INTERPRETATION:  ULTRASOUND GUIDANCE: Images demonstrate the needle within the nodule.     IMPRESSION:   CONCLUSION:  1.  Status post ultrasound-guided fine-needle aspiration of right thyroid nodule  Pathology showed:  RIGHT THYROID NODULE, FINE NEEDLE ASPIRATION WITH CELL BLOCK PREPARATION     - CONSISTENT WITH BENIGN COLLOID NODULE      US THYROID  8/21/2018 8:52 AM     INDICATION: Thyroid nodule seen on CT. Further characterization.  TECHNIQUE: Routine.  COMPARISON: CT  5/18/2018     FINDINGS:  RIGHT thyroid lobe measures 4.2 x 2.0 x 2.4 cm. Solitary nodule measures 1.9 x 1.8 cm mid gland.  Nodule: Mid gland nodule 1.9 cm.   Composition: Solid or almost completely solid 2   Echogenicity: Hyperechoic or isoechoic 1   Shape: Wider-than-tall 0   Margin: Smooth 0   Echogenic Foci: Punctate echoic foci 3   Point Total: 4-6 points. TI-RADS 4. FNA if >=1.5cm. Follow if >=1 cm.      LEFT thyroid lobe measures 3.5 x 1.4 x 1.7 cm. Single tiny nodule measures 8 x 6 mm should be of little significance.     Isthmus measures 3 mm. No additional findings.     No cervical lymphadenopathy.     IMPRESSION:   CONCLUSION:  1.  Solitary nodule mid right thyroid gland, TI-RADS 4, 1.9 cm. Consider fine-needle aspiration biopsy.     2.  No other significant findings.     TI-RADS 1. No FNA.  TI-RADS 2. No FNA.  TI-RADS 3. FNA if >=2.5 cm. Follow up ultrasound in 1 year >=1.5 cm.  TI-RADS 4. FNA if >=1.5cm. Follow up ultrasound in 1 year >=1 cm.  TI-RADS 5. FNA if >=1 cm. Follow up ultrasound in 1 year >=0.5 cm.      Electronically signed by Urabn Barker MD 06/12/19 2:44 PM

## 2021-05-31 VITALS — HEIGHT: 69 IN | WEIGHT: 200.7 LBS | BODY MASS INDEX: 29.73 KG/M2

## 2021-05-31 VITALS — BODY MASS INDEX: 30.08 KG/M2 | WEIGHT: 203.1 LBS | HEIGHT: 69 IN

## 2021-05-31 VITALS — BODY MASS INDEX: 30.87 KG/M2 | WEIGHT: 206 LBS

## 2021-05-31 VITALS — BODY MASS INDEX: 30.12 KG/M2 | WEIGHT: 201 LBS

## 2021-05-31 VITALS — BODY MASS INDEX: 30.21 KG/M2 | WEIGHT: 204 LBS | HEIGHT: 69 IN

## 2021-05-31 VITALS — BODY MASS INDEX: 30.51 KG/M2 | HEIGHT: 69 IN | WEIGHT: 206 LBS

## 2021-05-31 VITALS — WEIGHT: 206 LBS | HEIGHT: 69 IN | BODY MASS INDEX: 30.51 KG/M2

## 2021-06-01 VITALS — BODY MASS INDEX: 29.53 KG/M2 | WEIGHT: 200 LBS

## 2021-06-01 VITALS — WEIGHT: 199 LBS | BODY MASS INDEX: 29.39 KG/M2

## 2021-06-01 NOTE — PROGRESS NOTES
Assessment and Plan:       1. Encounter for general adult medical examination with abnormal findings  Annual wellness visit completed.  Risks associated with diet and the need for completion of advanced directives.  Declines flu shot otherwise immunizations up-to-date.  Annual wellness visits to continue.    2. Renal insufficiency  History of mild renal insufficiency with chronic kidney disease stage IIIa.  Repeat renal function.  Ensure adequate hydration.  - Comprehensive Metabolic Panel    3. Polycythemia  History of mild polycythemia.  Most recent CBC normal.  Repeat to ensure stable.  - HM2(CBC w/o Differential)    4. Hyperlipidemia LDL goal <100  History of hyperlipidemia.  Fasting.  Check lipid cascade today.  Dietary and exercise modification for weight goal less than 190 pounds initially, less than 185 pounds ideally.  - Lipid Cascade    5. Gastroesophageal reflux disease without esophagitis  GERD.  Famotidine 10 mg daily.  No breakthrough symptoms.  - Comprehensive Metabolic Panel    6. Thyroid nodule  History of thyroid nodule with prior biopsy confirming benign colloid nodule.  Her TSH normal.    7. Macular degeneration (senile) of retina  Macular degeneration.  Wet macular degeneration involving left eye and dry macular degeneration involving right eye.  Intraocular injections have been received previously.        The patient's current medical problems were reviewed.    I have had an Advance Directives discussion with the patient.  The following high BMI interventions were performed this visit: encouragement to exercise, weight monitoring, weight loss from baseline weight and lifestyle education regarding diet  Ensure ongoing efforts to achieve weight goal < 190 pounds initially, < 185 pounds ideally.       The following health maintenance schedule was reviewed with the patient and provided in printed form in the after visit summary:   Health Maintenance   Topic Date Due     ADVANCE CARE PLANNING   1961     ZOSTER VACCINES (1 of 2) 1993     MEDICARE ANNUAL WELLNESS VISIT  2008     PNEUMOCOCCAL POLYSACCHARIDE VACCINE AGE 65 AND OVER  2008     INFLUENZA VACCINE RULE BASED (1) 2019     FALL RISK ASSESSMENT  2020     TD 18+ HE  2023     PNEUMOCOCCAL CONJUGATE VACCINE FOR ADULTS (PCV13 OR PREVNAR)  Completed        Subjective:     Chief Complaint: Cali Gama is an 74 y.o. male here for an Annual Wellness visit.     HPI: In general doing well.  Hyperlipidemia, mild.  Diet controlled.  Mild renal insufficiency historically.  Reflux symptoms managed with famotidine 10 mg daily.  Benign colloid nodule of thyroid previously documented.  Bilateral knee replacements.  Prior TURP 2018 due to BPH without history of prostate cancer.  Denies recent illness.  No chest pain or palpitations.  Has had mildly elevated hemoglobin levels historically.  Macular degeneration bilateral eyes.    Review of Systems:  Please see above.  The rest of the review of systems are negative for all systems.    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 Navarro... [facility]   Lives in [place] x 6 months of the year   Hobbies: writing and weight lifting    Patient Care Team:  Urban Barker MD as PCP - General (Family Medicine)  Urban Barker MD as Assigned PCP     Patient Active Problem List   Diagnosis     Irritable Bowel Syndrome      BPH with urinary obstruction     Hypertrophy Of Breast     Osteoarthritis Of The Knee     Joint Pain, Localized In The Left Shoulder     Blood Pressure Isolated Elevated     Stage 2 chronic kidney disease     GERD (gastroesophageal reflux disease)     Thyroid nodule     Past Medical History:   Diagnosis Date     BPH with urinary obstruction      Chronic kidney disease       Past Surgical History:   Procedure Laterality Date     CHOLECYSTECTOMY       JOINT REPLACEMENT        Family History   Problem Relation Age of Onset     Cancer Father         bladder     Aneurysm Father      Dementia Mother      No Medical Problems Sister      No Medical Problems Daughter      No Medical Problems Maternal Grandmother      No Medical Problems Maternal Grandfather      No Medical Problems Paternal Grandmother      No Medical Problems Paternal Grandfather      No Medical Problems Maternal Aunt      No Medical Problems Paternal Aunt      BRCA 1/2 Neg Hx      Breast cancer Neg Hx      Colon cancer Neg Hx      Endometrial cancer Neg Hx      Ovarian cancer Neg Hx       Social History     Socioeconomic History     Marital status:      Spouse name: Not on file     Number of children: Not on file     Years of education: Not on file     Highest education level: Not on file   Occupational History     Not on file   Social Needs     Financial resource strain: Not on file     Food insecurity:     Worry: Not on file     Inability: Not on file     Transportation needs:     Medical: Not on file     Non-medical: Not on file   Tobacco Use     Smoking status: Former Smoker     Smokeless tobacco: Never Used   Substance and Sexual Activity     Alcohol use: Yes     Alcohol/week: 1.2 oz     Types: 2 Standard drinks or equivalent per week     Drug use: No     Sexual activity: Not on file   Lifestyle     Physical activity:     Days per week: Not on file     Minutes per session: Not on file     Stress: Not on file   Relationships     Social  "connections:     Talks on phone: Not on file     Gets together: Not on file     Attends Adventism service: Not on file     Active member of club or organization: Not on file     Attends meetings of clubs or organizations: Not on file     Relationship status: Not on file     Intimate partner violence:     Fear of current or ex partner: Not on file     Emotionally abused: Not on file     Physically abused: Not on file     Forced sexual activity: Not on file   Other Topics Concern     Not on file   Social History Narrative     Not on file      Current Outpatient Medications   Medication Sig Dispense Refill     acetaminophen (TYLENOL) 500 MG tablet Take 500 mg by mouth every 6 (six) hours as needed for pain.       diphenhydrAMINE (BENADRYL) 25 mg capsule Take 25-50 mg by mouth at bedtime as needed for itching.       famotidine (FOR PEPCID) 10 MG tablet Take 10 mg by mouth 2 (two) times a day.       ibuprofen (ADVIL,MOTRIN) 200 MG tablet Take 400 mg by mouth every 6 (six) hours as needed for pain.       No current facility-administered medications for this visit.       Objective:   Vital Signs:   Visit Vitals  /86   Pulse 78   Ht 5' 8.5\" (1.74 m)   Wt 204 lb (92.5 kg)   SpO2 97%   BMI 30.57 kg/m         VisionScreening:  No exam data present     PHYSICAL EXAM    General Appearance:    Alert, cooperative, no distress, appears stated age.  Obesity.   Head:    Normocephalic, without obvious abnormality, atraumatic   Eyes:    PERRL, conjunctiva/corneas clear, EOM's intact, fundi     benign, both eyes        Ears:    Normal TM's and external ear canals, both ears.  Cerumen removed from right external auditory canal with residual cerumen noted.  Patient declines ear lavage etc.   Nose:   Nares normal, septum midline, mucosa normal, no drainage    or sinus tenderness   Throat:   Lips, mucosa, and tongue normal; teeth and gums normal   Neck:   Supple, symmetrical, trachea midline, no adenopathy;        thyroid:  No " enlargement/tenderness/nodules; no carotid    bruit or JVD   Back:     Symmetric, no curvature, ROM normal, no CVA tenderness   Lungs:     Clear to auscultation bilaterally, respirations unlabored   Chest wall:    No tenderness or deformity   Heart:    Regular rate and rhythm, S1 and S2 normal, no murmur, rub   or gallop   Abdomen:     Soft, non-tender, bowel sounds active all four quadrants,     no masses, no organomegaly.     Genitalia:    deferred per patient   Rectal:    Normal tone.  Prostate moderately enlarged/symmetric, no masses or tenderness.   Extremities:   Extremities normal, atraumatic, no cyanosis or edema   Pulses:   2+ and symmetric all extremities   Skin:   Skin color, texture, turgor normal, no rashes or lesions   Lymph nodes:   Cervical, supraclavicular, and axillary nodes normal   Neurologic:   CNII-XII intact. Normal strength, sensation and reflexes       throughout        Assessment Results 9/11/2019   Activities of Daily Living No help needed   Instrumental Activities of Daily Living No help needed   Get Up and Go Score Less than 12 seconds   Mini Cog Total Score 4   Some recent data might be hidden     A Mini-Cog score of 0-2 suggests the possibility of dementia, score of 3-5 suggests no dementia    Identified Health Risks:     The patient was counseled and encouraged to consider modifying their diet and eating habits. He was provided with information on recommended healthy diet options.  Information regarding advance directives (living [place]), including where he can download the appropriate form, was provided to the patient via the AVS.

## 2021-06-03 ENCOUNTER — OFFICE VISIT - HEALTHEAST (OUTPATIENT)
Dept: INFECTIOUS DISEASES | Facility: CLINIC | Age: 76
End: 2021-06-03

## 2021-06-03 ENCOUNTER — AMBULATORY - HEALTHEAST (OUTPATIENT)
Dept: LAB | Facility: CLINIC | Age: 76
End: 2021-06-03

## 2021-06-03 VITALS — BODY MASS INDEX: 29.92 KG/M2 | HEIGHT: 69 IN | WEIGHT: 202 LBS

## 2021-06-03 VITALS
DIASTOLIC BLOOD PRESSURE: 86 MMHG | OXYGEN SATURATION: 97 % | HEIGHT: 69 IN | HEART RATE: 78 BPM | BODY MASS INDEX: 30.21 KG/M2 | WEIGHT: 204 LBS | SYSTOLIC BLOOD PRESSURE: 136 MMHG

## 2021-06-03 DIAGNOSIS — K68.9 RETROPERITONEAL INFECTION: ICD-10-CM

## 2021-06-03 DIAGNOSIS — R61 NIGHT SWEATS: ICD-10-CM

## 2021-06-03 LAB
ALBUMIN SERPL-MCNC: 4.4 G/DL (ref 3.5–5)
ALBUMIN UR-MCNC: NEGATIVE G/DL
ALP SERPL-CCNC: 126 U/L (ref 45–120)
ALT SERPL W P-5'-P-CCNC: 20 U/L (ref 0–45)
ANION GAP SERPL CALCULATED.3IONS-SCNC: 14 MMOL/L (ref 5–18)
APPEARANCE UR: CLEAR
AST SERPL W P-5'-P-CCNC: 20 U/L (ref 0–40)
BASOPHILS # BLD AUTO: 0.1 THOU/UL (ref 0–0.2)
BASOPHILS NFR BLD AUTO: 1 % (ref 0–2)
BILIRUB SERPL-MCNC: 1 MG/DL (ref 0–1)
BILIRUB UR QL STRIP: NEGATIVE
BUN SERPL-MCNC: 11 MG/DL (ref 8–28)
C REACTIVE PROTEIN LHE: 0.7 MG/DL (ref 0–0.8)
CALCIUM SERPL-MCNC: 9.7 MG/DL (ref 8.5–10.5)
CHLORIDE BLD-SCNC: 103 MMOL/L (ref 98–107)
CO2 SERPL-SCNC: 23 MMOL/L (ref 22–31)
COLOR UR AUTO: YELLOW
CREAT SERPL-MCNC: 1.21 MG/DL (ref 0.7–1.3)
EOSINOPHIL # BLD AUTO: 0.2 THOU/UL (ref 0–0.4)
EOSINOPHIL NFR BLD AUTO: 2 % (ref 0–6)
ERYTHROCYTE [DISTWIDTH] IN BLOOD BY AUTOMATED COUNT: 13.2 % (ref 11–14.5)
GFR SERPL CREATININE-BSD FRML MDRD: 58 ML/MIN/1.73M2
GLUCOSE BLD-MCNC: 102 MG/DL (ref 70–125)
GLUCOSE UR STRIP-MCNC: NEGATIVE MG/DL
HCT VFR BLD AUTO: 50.2 % (ref 40–54)
HGB BLD-MCNC: 17 G/DL (ref 14–18)
HGB UR QL STRIP: NEGATIVE
IMM GRANULOCYTES # BLD: 0 THOU/UL
IMM GRANULOCYTES NFR BLD: 1 %
KETONES UR STRIP-MCNC: NEGATIVE MG/DL
LEUKOCYTE ESTERASE UR QL STRIP: NEGATIVE
LIPASE SERPL-CCNC: 32 U/L (ref 0–52)
LYMPHOCYTES # BLD AUTO: 1.4 THOU/UL (ref 0.8–4.4)
LYMPHOCYTES NFR BLD AUTO: 19 % (ref 20–40)
MCH RBC QN AUTO: 30.9 PG (ref 27–34)
MCHC RBC AUTO-ENTMCNC: 33.9 G/DL (ref 32–36)
MCV RBC AUTO: 91 FL (ref 80–100)
MONOCYTES # BLD AUTO: 0.7 THOU/UL (ref 0–0.9)
MONOCYTES NFR BLD AUTO: 9 % (ref 2–10)
NEUTROPHILS # BLD AUTO: 5.1 THOU/UL (ref 2–7.7)
NEUTROPHILS NFR BLD AUTO: 68 % (ref 50–70)
NITRATE UR QL: NEGATIVE
PH UR STRIP: 6 [PH] (ref 5–8)
PLATELET # BLD AUTO: 257 THOU/UL (ref 140–440)
PMV BLD AUTO: 9.6 FL (ref 7–10)
POTASSIUM BLD-SCNC: 4.2 MMOL/L (ref 3.5–5)
PROT SERPL-MCNC: 7.4 G/DL (ref 6–8)
RBC # BLD AUTO: 5.51 MILL/UL (ref 4.4–6.2)
SODIUM SERPL-SCNC: 140 MMOL/L (ref 136–145)
SP GR UR STRIP: 1.02 (ref 1–1.03)
UROBILINOGEN UR STRIP-ACNC: NORMAL
WBC: 7.5 THOU/UL (ref 4–11)

## 2021-06-03 RX ORDER — CEFDINIR 300 MG/1
300 CAPSULE ORAL 2 TIMES DAILY
Qty: 28 CAPSULE | Refills: 0 | Status: SHIPPED | OUTPATIENT
Start: 2021-06-03 | End: 2021-06-17

## 2021-06-03 RX ORDER — TRIAMCINOLONE ACETONIDE 1 MG/G
CREAM TOPICAL
Status: SHIPPED | COMMUNITY
Start: 2020-10-08 | End: 2022-05-20

## 2021-06-03 RX ORDER — METRONIDAZOLE 500 MG/1
500 TABLET ORAL 3 TIMES DAILY
Qty: 42 TABLET | Refills: 0 | Status: SHIPPED | OUTPATIENT
Start: 2021-06-03 | End: 2021-06-17

## 2021-06-03 NOTE — TELEPHONE ENCOUNTER
Patient sent letter inquiring billing for date of service 9/11/19.    Spoke to patient today; I am looking into why this was not sent out to medicare.

## 2021-06-04 ENCOUNTER — COMMUNICATION - HEALTHEAST (OUTPATIENT)
Dept: INFECTIOUS DISEASES | Facility: CLINIC | Age: 76
End: 2021-06-04

## 2021-06-04 VITALS
WEIGHT: 200.1 LBS | DIASTOLIC BLOOD PRESSURE: 80 MMHG | OXYGEN SATURATION: 99 % | BODY MASS INDEX: 29.55 KG/M2 | SYSTOLIC BLOOD PRESSURE: 128 MMHG | HEART RATE: 74 BPM

## 2021-06-04 VITALS
WEIGHT: 204.4 LBS | DIASTOLIC BLOOD PRESSURE: 62 MMHG | SYSTOLIC BLOOD PRESSURE: 130 MMHG | HEART RATE: 80 BPM | HEIGHT: 69 IN | BODY MASS INDEX: 30.27 KG/M2

## 2021-06-04 NOTE — TELEPHONE ENCOUNTER
Spoke to billing today; requesting a coding review. I suspect a subsequent AWV code should have been applied instead of initial. Can take up to 30 days.

## 2021-06-05 VITALS — HEIGHT: 69 IN | WEIGHT: 200 LBS | BODY MASS INDEX: 29.62 KG/M2

## 2021-06-05 VITALS — WEIGHT: 200 LBS | BODY MASS INDEX: 29.62 KG/M2 | HEIGHT: 69 IN

## 2021-06-05 LAB
H CAPSUL AG UR QL IA: NOT DETECTED
H CAPSUL AG UR-MCNC: NOT DETECTED NG/ML

## 2021-06-07 ENCOUNTER — COMMUNICATION - HEALTHEAST (OUTPATIENT)
Dept: INFECTIOUS DISEASES | Facility: CLINIC | Age: 76
End: 2021-06-07

## 2021-06-07 LAB — COCCIDIOIDES AB TITR SER CF: NORMAL {TITER}

## 2021-06-08 ENCOUNTER — OFFICE VISIT - HEALTHEAST (OUTPATIENT)
Dept: FAMILY MEDICINE | Facility: CLINIC | Age: 76
End: 2021-06-08

## 2021-06-08 DIAGNOSIS — N18.31 STAGE 3A CHRONIC KIDNEY DISEASE (H): ICD-10-CM

## 2021-06-08 DIAGNOSIS — R61 NIGHT SWEATS: ICD-10-CM

## 2021-06-08 DIAGNOSIS — Z87.898 HX OF ABDOMINAL ABSCESS: ICD-10-CM

## 2021-06-08 LAB
AEROBIC BLOOD CULTURE BOTTLE: NO GROWTH
AEROBIC BLOOD CULTURE BOTTLE: NO GROWTH
ANAEROBIC BLOOD CULTURE BOTTLE: NO GROWTH
ANAEROBIC BLOOD CULTURE BOTTLE: NO GROWTH

## 2021-06-10 NOTE — TELEPHONE ENCOUNTER
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  I would recommend that he see our spine care clinic in order to review and discuss further treatment options in order to improve his symptoms.  I will place an order for [facility].  "

## 2021-06-10 NOTE — PROGRESS NOTES
Assessment/Plan:    1. BPH with urinary obstruction  BPH with urinary obstruction history status post prior TURP procedure February 5, 2018.  Is on tamsulosin 0.4 mg using 1 capsule twice daily apparently per urologist and [place] however would like to see local urologist.  Will refer patient to see Dr. Mario Reynolds with [facility] in order to continue ongoing management.  - Ambulatory referral to Urology  - tamsulosin (FLOMAX) 0.4 mg cap; Take 2 capsules (0.8 mg total) by mouth Daily after breakfast.  Dispense: 180 capsule; Refill: 0    2. Arm paresthesia, left  Left arm paresthesias intermittent typically worse after awakening in the morning.  Cervical MRI to rule out cervical radiculopathy.  - MR Cervical Spine Without Contrast; Future    3. Seborrheic keratoses  Pigmented seborrheic keratoses likely left lateral chest wall otherwise numerous seborrheic keratoses noted over trunk.    4. Blackhead  Right cheek pigmented lesion did use 11 blade to open and express likely retained oil.  Will monitor currently.    5. Atypical pigmented skin lesion  Did recommend dermatology skin check and referral was placed for follow-up pigmented skin lesion left lateral chest wall question seborrheic keratoses versus other plus numerous other skin lesions.  - Ambulatory referral to Dermatology          Subjective:    Cali TRACEE Gama is seen today for BPH with urinary obstruction.  Using tamsulosin 0.4 mg using 1 capsule twice daily.  Urologist and [place].  Had to have catheter in January apparently but he used for a month because of urinary retention issues.  Worried about having to have that happen again would like to follow-up with urology.  Had seen Dr. ward in the past however would like to see different urologist ideally.  Continues famotidine 10 mg twice daily for reflux.  CKD stage IIIa historically.  Skin lesion right anterior cheek is pigmented.  Seems that has been there for about a year.  Does not feel it is  an ingrown hair.  History of hypercholesterolemia with prior cholesterol 265 and  with HDL 53 in September 11, 2019.  Fasting glucose at that time was 98 with creatinine 1.29 GFR 54.  Comprehensive review of systems as above otherwise all negative.    Past Surgical History:   Procedure Laterality Date     CHOLECYSTECTOMY       JOINT REPLACEMENT          Family History   Problem Relation Age of Onset     Cancer Father         bladder     Aneurysm Father      Dementia Mother      No Medical Problems Sister      No Medical Problems Daughter      No Medical Problems Maternal Grandmother      No Medical Problems Maternal Grandfather      No Medical Problems Paternal Grandmother      No Medical Problems Paternal Grandfather      No Medical Problems Maternal Aunt      No Medical Problems Paternal Aunt      BRCA 1/2 Neg Hx      Breast cancer Neg Hx      Colon cancer Neg Hx      Endometrial cancer Neg Hx      Ovarian cancer Neg Hx         Past Medical History:   Diagnosis Date     BPH with urinary obstruction      Chronic kidney disease         Social History     Tobacco Use     Smoking status: Former Smoker     Smokeless tobacco: Never Used   Substance Use Topics     Alcohol use: Yes     Alcohol/week: 2.0 standard drinks     Types: 2 Standard drinks or equivalent per week     Drug use: No        Current Outpatient Medications   Medication Sig Dispense Refill     acetaminophen (TYLENOL) 500 MG tablet Take 500 mg by mouth every 6 (six) hours as needed for pain.       diphenhydrAMINE (BENADRYL) 25 mg capsule Take 25-50 mg by mouth at bedtime as needed for itching.       famotidine (FOR PEPCID) 10 MG tablet Take 10 mg by mouth 2 (two) times a day.       ibuprofen (ADVIL,MOTRIN) 200 MG tablet Take 400 mg by mouth every 6 (six) hours as needed for pain.       tamsulosin (FLOMAX) 0.4 mg cap Take 2 capsules (0.8 mg total) by mouth Daily after breakfast. 180 capsule 0     No current facility-administered medications for this  "visit.           Objective:    Vitals:    08/04/20 1403   BP: 130/62   Patient Site: Right Arm   Patient Position: Sitting   Cuff Size: Adult Large   Pulse: 80   Weight: 204 lb 6.4 oz (92.7 kg)   Height: 5' 9\" (1.753 m)      Body mass index is 30.18 kg/m .    Alert.  No apparent distress.  Chest clear.  Cardiac exam regular.  Left lateral chest wall with pigmented skin lesion likely pigmented seborrheic keratoses.  Right cheek also with darkened area question of ingrown hair versus blackhead appearance.  11 blade used to open this and express retained coil otherwise no signs of secondary infection.  Upper extremity CMS appears intact currently on exam with symmetric strength etc. noted.      This note has been dictated using voice recognition software and as a result may contain minor grammatical errors and unintended word substitutions.   "

## 2021-06-10 NOTE — PROGRESS NOTES
"Assessment:    1. IBS (irritable bowel syndrome)     2. Internal hemorrhoids     3. BPH with urinary obstruction     4. Thoracic back pain  Ambulatory referral to Spine Care    XR Thoracic Spine 2 VWS    XR Chest PA and Lateral         Plan:    Thoracic spine x-rays and chest x-ray were completed due to thoracic back pain issues.  Chest x-ray appears negative.  Degenerative changes however involving thoracic spine best seen on thoracic spine films with disc space narrowing significant at T3-T4 and T4-T5 likely.  Mild evidence of scoliosis on AP film.  Was referred to [facility] spine care for further evaluation and treatment options.  Also discussed IBS management with improvement noted.  Some perianal dermatitis.  Will continue Metamucil on a consistent basis.  Diagnostic anoscopy with internal hemorrhoids without bleeding.  Good anal tone identified without evidence for fecal leakage etc.  Patient also has elected to remain off finasteride for BPH management with history of gynecomastia associated with this.  We will see his urologist May 31, 2017 to review further treatment options.        Subjective:    Cali Gama is seen today for bowel and back issues.  History of IBS.  States that symptoms \"got away from me\" last summer.  Was not consuming enough roughage or Metamucil etc.  Has recently resumed Metamucil use and now symptoms have resolved.  Wondering about potential for rectal prolapse.  Has not had any significant bleeding.  Does have diverticulosis of sigmoid colon previously identified in colonoscopy March 28, 2011 told to repeat 10 year interval.  Using a hemorrhoid clear gel topically due to area of chafing and irritation described.  Previously on finasteride 5 mg daily however discontinued due to side effects of gynecomastia.  Patient is scheduled to see his urologist May 31, 2017 with history of BPH however current symptoms of prostatism remain in check following recent discontinuation of finasteride.  " Comprehensive review of systems as above otherwise all negative.    Remarried - Mira x 36 years   No children together   2 children from prior relationship ( when in his 20s)   Gmom - lived to 108   Mom - Marielle's (age 95)   Dad -  bladder CA (smoker and drinker)   No siblings   Work: retired   [employer] ([employer])   No smoke (age [age] perhaps 1 ppd, then quit)   EtOH: occ   Surgeries: bilateral knee replaced (right knee twice) ankle, hand, gallbladder   Hospitalizations: H/O enlarged prostate (prior biopsy x 2) - Dr. Navarro... [facility]   Lives in [place] x 6 months of the year   Hobbies: writing and weight lifting     Past Surgical History:   Procedure Laterality Date     CHOLECYSTECTOMY       JOINT REPLACEMENT          Family History   Problem Relation Age of Onset     Cancer Father      bladder     Aneurysm Father      Dementia Mother      No Medical Problems Sister      No Medical Problems Daughter      No Medical Problems Maternal Grandmother      No Medical Problems Maternal Grandfather      No Medical Problems Paternal Grandmother      No Medical Problems Paternal Grandfather      No Medical Problems Maternal Aunt      No Medical Problems Paternal Aunt      BRCA 1/2 Neg Hx      Breast cancer Neg Hx      Colon cancer Neg Hx      Endometrial cancer Neg Hx      Ovarian cancer Neg Hx         Past Medical History:   Diagnosis Date     BPH with urinary obstruction         Social History   Substance Use Topics     Smoking status: Former Smoker     Smokeless tobacco: None     Alcohol use Yes        Current Outpatient Prescriptions   Medication Sig Dispense Refill     famotidine (PEPCID) 20 MG tablet Take 20 mg by mouth 2 (two) times a day.       finasteride (PROSCAR) 5 mg tablet Take 5 mg by mouth daily.       No current facility-administered medications for this visit.           Objective:    Vitals:    17 1042   BP: 126/80   Pulse: 80   Weight: 201 lb (91.2 kg)      Body  mass index is 30.12 kg/(m^2).    Alert.  No apparent distress.  Thoracic spine appears midline without significant scoliosis.  Mild midline tenderness upper thoracic spine without significant paravertebral muscle spasm or tenderness noted.  Perianal exam today with mild chafing.  No external hemorrhoids.  Anal tone normal.  Prostate enlargement, symmetric without induration or nodularity.  Diagnostic endoscopy completed with internal hemorrhoids without active bleeding.  No rectal mass.

## 2021-06-10 NOTE — PROGRESS NOTES
ASSESSMENT:     Diagnoses and all orders for this visit:    Thoracic back pain  -     Vitamin D, Total (25-Hydroxy); Future  -     cyclobenzaprine (FLEXERIL) 5 MG tablet; Take 1 tablet (5 mg total) by mouth 3 (three) times a day as needed for muscle spasms.  Dispense: 30 tablet; Refill: 0  -     Ambulatory referral to Physical Therapy    Cervicalgia  -     Vitamin D, Total (25-Hydroxy); Future  -     cyclobenzaprine (FLEXERIL) 5 MG tablet; Take 1 tablet (5 mg total) by mouth 3 (three) times a day as needed for muscle spasms.  Dispense: 30 tablet; Refill: 0  -     Ambulatory referral to Physical Therapy    Myofascial pain  -     Vitamin D, Total (25-Hydroxy); Future  -     cyclobenzaprine (FLEXERIL) 5 MG tablet; Take 1 tablet (5 mg total) by mouth 3 (three) times a day as needed for muscle spasms.  Dispense: 30 tablet; Refill: 0  -     Ambulatory referral to Physical Therapy    Paresthesia and pain of left extremity  -     Vitamin D, Total (25-Hydroxy); Future  -     cyclobenzaprine (FLEXERIL) 5 MG tablet; Take 1 tablet (5 mg total) by mouth 3 (three) times a day as needed for muscle spasms.  Dispense: 30 tablet; Refill: 0  -     Ambulatory referral to Physical Therapy    Fatigue  -     Vitamin D, Total (25-Hydroxy); Future  -     cyclobenzaprine (FLEXERIL) 5 MG tablet; Take 1 tablet (5 mg total) by mouth 3 (three) times a day as needed for muscle spasms.  Dispense: 30 tablet; Refill: 0  -     Ambulatory referral to Physical Therapy    Vitamin D deficiency   -     Vitamin D, Total (25-Hydroxy); Future            Cali Gama is a 72 y.o. male  with a BMI of Body mass index is 30.07 kg/(m^2). who presents today in consultation at the request of Urban Barber MD  for new patient evaluation of chronic thoracic pain that is likely myofascial in nature, and acute right cervicalgia likely due to muscle sprain strain.  Patient has left wrist, hand numbness and tingling that could be due to cervical radiculopathy or  carpal tunnel syndrome.  Patient declines EMG testing or cervical MRI at this time. I  Recommend patient to start with physical therapy for the thoracic and the cervical spine.  I recommend  Flexeril 5 mg at nighttime for muscle spasm.  If his symptoms does not improve in 3-4 weeks with physical therapy and medication I recommend cervical MRI to evaluate for cervical radiculopathy to the left upper extremity.          ANN:  28  WHO-5:  19    PLAN:  A shared decision making model was used.  The patient's values and choices were respected.  The following represents what was discussed and decided upon by the physician and the patient.      1.  DIAGNOSTIC TESTS: No imaging at this time.  I reviewed the thoracic x-rays imaging and the report with the patient today.  2.  PHYSICAL THERAPY:  Discussed the importance of core strengthening, ROM, stretching exercises with the patient and how each of these entities is important in decreasing pain.  Explained to the patient that the purpose of physical therapy is to teach the patient a home exercise program.  These exercises need to be performed every day in order to decrease pain and prevent future occurrences of pain.  Likened it to brushing one's teeth.  Patient will start on physical therapy - [facility] program.   3.  MEDICATIONS: Patient will start on Flexeril 5 mg at nighttime for muscle spasm as needed.  Side effects of the medication discussed with the patient today.  4.  INTERVENTIONS: No therapeutic steroid injections at this time.   5.  PATIENT EDUCATION: I thoroughly discussed the plan with the patient today.  He verbalizes understanding and agrees with the plan.      FOLLOW-UP:   Patient will follow up with me in 3 weeks.  All questions were answered.      OSCAR Mary, MPA-C          SUBJECTIVE:  Cali Gama  Is a 72 y.o. male who presents today for new patient evaluation of thoracic pain.  Patient reports chronic thoracic pain for the last year.  Patient  stated that he has been trying to go back to the gym and lifting weights and noticed ongoing thoracic pain that is more pronounced in the mid line at the T8-T9 level.  He also reports intermittent right-sided neck pain that occasionally radiates proximally towards the base of the skull.  At this time his thoracic pain is worse than the neck pain.  He describes the thoracic pain as dull intermittent 8 out of 10 intensity pain.  His symptoms are aggravated by lifting weights, bending over, standing and rates it at 10 out of 10 intensity on its worst.  His symptoms are alleviated by rest and rates it at 2-3 out of 10 intensity on its best.  Patient states he does not like to take medication for pain.  He denies history of back surgeries, motor vehicle accidents or neck surgeries.  Patient reports occasionally numbness and tingling in the left hand that is more pronounced at nighttime.  He denies radicular pain to the left upper extremity.  He denies history of physical therapy.    Thoracic x-rays done on May 23 of 2017 shows slight wedging of the T9 and T10 vertebral bodies that is most likely chronic nature with anterior osteophyte formation.  There is degenerative disc disease involving the mid to lower thoracic disc spaces.    Patient denies urinary or bowel incontinence, unintentional weight loss, saddle anesthesia, fever or chills, balance difficulties.      Medications:    Current Outpatient Prescriptions   Medication Sig Dispense Refill     famotidine (PEPCID) 20 MG tablet Take 20 mg by mouth 2 (two) times a day.       finasteride (PROSCAR) 5 mg tablet Take 5 mg by mouth daily.       cyclobenzaprine (FLEXERIL) 5 MG tablet Take 1 tablet (5 mg total) by mouth 3 (three) times a day as needed for muscle spasms. 30 tablet 0     No current facility-administered medications for this encounter.        Allergies:   Allergies   Allergen Reactions     Morphine        PMH:    Past Medical History:   Diagnosis Date     BPH  with urinary obstruction        PSH:    Past Surgical History:   Procedure Laterality Date     CHOLECYSTECTOMY       JOINT REPLACEMENT         Family History:    Family History   Problem Relation Age of Onset     Cancer Father      bladder     Aneurysm Father      Dementia Mother      No Medical Problems Sister      No Medical Problems Daughter      No Medical Problems Maternal Grandmother      No Medical Problems Maternal Grandfather      No Medical Problems Paternal Grandmother      No Medical Problems Paternal Grandfather      No Medical Problems Maternal Aunt      No Medical Problems Paternal Aunt      BRCA 1/2 Neg Hx      Breast cancer Neg Hx      Colon cancer Neg Hx      Endometrial cancer Neg Hx      Ovarian cancer Neg Hx        Social History:   Social History     Social History     Marital status:      Spouse name: N/A     Number of children: N/A     Years of education: N/A     Occupational History     Not on file.     Social History Main Topics     Smoking status: Former Smoker     Smokeless tobacco: Not on file     Alcohol use Yes     Drug use: No     Sexual activity: Not on file     Other Topics Concern     Not on file     Social History Narrative       ROS: Thoracic pain, right-sided neck pain, occasional left hand numbness and tingling.  Specifically negative for bowel/bladder dysfunction, fevers,chills, appetite changes, unexplained weight loss.   Otherwise 13 systems reviewed are negative.  Please see the patient's intake questionnaire from today for details.      OBJECTIVE:  PHYSICAL EXAMINATION:    CONSTITUTIONAL:  Vital signs as above.  No acute distress.  The patient is well nourished and well groomed.  PSYCHIATRIC:  The patient is awake, alert, oriented to person, place, time and answering questions appropriately with clear speech.    SKIN:  Skin over the face, bilateral lower extremities, and posterior torso is clean, dry, intact without rashes.    GAIT:  Gait is non-antalgic.  The  patient is able to heel and toe walk without significant difficulty.    STANDING EXAMINATION: Tenderness present at the upper thoracic region bilaterally, and periscapular region as well.  Mild tenderness at the right upper trapezius.  MUSCLE STRENGTH:  The patient has 5/5 strength for the bilateral hip flexors, knee flexors/extensors, ankle dorsiflexors/plantar flexors, great toe extensors, ankle evertors/invertors.  NEUROLOGICAL:  2/4 patellar, medial hamstring, and achilles reflexes bilaterally.  Negative Babinski's bilaterally.  No ankle clonus bilaterally. Sensation to light touch is intact in the bilateral L4, L5, and S1 dermatomes.  VASCULAR:  2/4  pulses bilaterally.  Bilateral lower extremities are warm.  There is no pitting edema of the bilateral lower extremities.  ABDOMINAL:  Soft, non-distended, non-tender throughout all quadrants.  No pulsatile mass palpated in the left lower quadrant.  LYMPH NODES:  No palpable or tender inguinal/popliteal lymph nodes.  MUSCULOSKELETAL: Negative straight leg raise bilaterally.     USCULOSKELETAL: The patient has 5/5 strength for the bilateral shoulder abductors, elbow flexors/extensors, wrist extensors, finger flexors/abductors.   NEUROLOGICAL:  2  Biceps, brachioradialis, triceps reflexes bilaterally.  Negative Barreto's bilaterally.  Sensation to pinprick is intact.  Negative phalen test on the left wrist. Negative Tinel test on  Left wrist.       .      RESULTS:      XR THORACIC SPINE 2 VWS   5/23/2017 11:48 AM     INDICATION: Pain in thoracic spine.  COMPARISON: None.     FINDINGS: There is good anatomic alignment of the thoracic spine. The vertebral body heights are well-maintained throughout. There is slight wedging of the T9 and T10 vertebral bodies. This most likely is chronic in nature with anterior osteophyte   formation visualized. There is prominent degenerative disc disease noted involving the mid to lower thoracic disc spaces. These levels have a  moderate loss of disc space height, endplate changes and small anterior lateral osteophyte formations. Surgical   clips are noted within the right upper abdominal quadrant.

## 2021-06-11 NOTE — PATIENT INSTRUCTIONS - HE
Your symptoms seem most consistent with carpal tunnel syndrome.  It seems unlikely that a pinched nerve in your neck would be causing your symptoms, given that your symptoms typically only occur when you are trying to sleep.  A pinched nerve in your neck, would be expected to cause more constant pain during the day.    An EMG of your hands is ordered today to look for carpal tunnel syndrome or other signs of nerve damage.  You can schedule the EMG before you leave, or if you prefer to call us to schedule the EMG after you see how you are recovering from your surgery, you are welcome to call [phone] to schedule the EMG appointment.  You should schedule an appointment for follow-up with Dr. Jaramillo after the EMG to review the results and discuss treatment options.    If you would like to start treatment for presumed carpal tunnel syndrome, you can purchase over-the-counter wrist braces to wear at night while you sleep.  You can take the braces off during the day.    Please do not hesitate to contact the clinic at [phone] if you have any questions/concerns or any worsening of your symptoms.

## 2021-06-11 NOTE — PROGRESS NOTES
Assessment:     Diagnoses and all orders for this visit:    Thoracic back pain  -     MR Thoracic Spine Without Contrast; Future; Expected date: 6/22/17  -     naproxen (NAPROSYN) 500 MG tablet; Take 1 tablet (500 mg total) by mouth 2 (two) times a day with meals.  Dispense: 60 tablet; Refill: 1    Myofascial pain  -     naproxen (NAPROSYN) 500 MG tablet; Take 1 tablet (500 mg total) by mouth 2 (two) times a day with meals.  Dispense: 60 tablet; Refill: 1    Fatigue    Vitamin D deficiency           Cali Gama is a 72 y.o. y.o. male with past medical history significant for irritable bowel syndrome, benign prostatic hypertrophy, GERD, who presents today for follow-up regarding thoracic pain, and cervicalgia.  Patient reports improvement in neck pain however he still complains of thoracic pain that is worsened with physical therapy.  Patient symptoms of the thoracic pain are likely myofascial in nature and due to facet arthropathy.  We will obtain thoracic MRI to evaluate for the significance of the facet arthropathy and consider medial branch block.  I prescribed naproxen 500 mg to be taken 1 tablet twice daily for pain.  Patient will follow-up with his urologist to discuss the safety of being on naproxen with his kidney function.  Patient verbalizes understanding.  I recommend patient to continue with physical therapy.       Plan:     A shared decision making plan was used.  The patient's values and choices were respected.  The following represents what was discussed and decided upon by the physician and the patient.      1.  DIAGNOSTIC TESTS: I ordered a thoracic MRI.  2.  PHYSICAL THERAPY: Patient will continue on physical therapy.  3.  MEDICATIONS: Patient will start on naproxen 500 mg 1 tablet twice daily after consulting with his urologist.  Patient verbalizes understanding..   4.  INTERVENTIONS: We will consider medial branch block to the thoracic facets after reviewing the thoracic MRI.    5.  PATIENT  EDUCATION: I thoroughly discussed the plan with the patient today.  He verbalizes understanding and agrees with the plan.   6.  FOLLOW-UP: Patient will follow up with me in 1 weeks.   All questions were answered.      OSCAR Mary, MPA-C    Subjective:     Cali Gama is a 72 y.o. male who presents today for follow-up regarding chronic thoracic pain, and neck pain for the last year.  Today patient reports improvement in neck pain after physical therapy however he reports worsening of his thoracic pain with physical therapy.  He has been taking Advil 200 mg 2-3 tablets daily without any pain relief.  At this time he reports 4 out of 10 intensity pain in the upper thoracic region from T5 through T12 paraspinal muscle region.  His symptoms are aggravated by bending forward, extension of the thoracic spine and rates it at 6 out of 10 intensity on its worst.  He denies trauma to the thoracic region.  He denies radicular pain to the lower lumbar spine.  Patient still complains of occasional numbness and tingling in the left hand especially at nighttime.  Patient denies urinary or bowel incontinence, unintentional weight loss, saddle anesthesia, fever or chills, balance difficulties.      Review of Systems:  Thoracic pain.Patient denies urinary or bowel incontinence, unintentional weight loss, saddle anesthesia, fever or chills, balance difficulties.       Objective:   CONSTITUTIONAL:  Vital signs as above.  No acute distress.  The patient is well nourished and well groomed.    PSYCHIATRIC:  The patient is awake, alert, oriented to person, place and time.  The patient is answering questions appropriately with clear speech.  Normal affect.  SKIN:  Skin over the face, neck bilateral upper extremities is clean, dry, intact without rashes.  MUSCULOSKELETAL: The patient has 5/5 strength for the bilateral shoulder abductors, elbow flexors/extensors, wrist extensors, finger flexors/abductors.   NEUROLOGICAL:  2/4   Biceps, brachioradialis, triceps reflexes bilaterally.  Negative Barreto's bilaterally.  Sensation to pinprick is intact.  Negative phalen test on the left wrist. Negative Tinel test on  Left wrist.        RESULTS:    XR THORACIC SPINE 2 VWS   5/23/2017 11:48 AM      INDICATION: Pain in thoracic spine.  COMPARISON: None.      FINDINGS: There is good anatomic alignment of the thoracic spine. The vertebral body heights are well-maintained throughout. There is slight wedging of the T9 and T10 vertebral bodies. This most likely is chronic in nature with anterior osteophyte   formation visualized. There is prominent degenerative disc disease noted involving the mid to lower thoracic disc spaces. These levels have a moderate loss of disc space height, endplate changes and small anterior lateral osteophyte formations. Surgical   clips are noted within the right upper abdominal quadrant.

## 2021-06-11 NOTE — PROGRESS NOTES
ASSESSMENT: Cali Gama is a 75 y.o. male  with a BMI of 29.53 with past medical history significant for stage II chronic kidney disease, macular degeneration,  benign prostatic hypertrophy, irritable bowel syndrome, osteoarthritis  who presents today for new patient consultation at the request of Dr. Urban Barker regarding bilateral upper extremity paresthesias.  The paresthesias go from the elbows down to the hands and into the fingers, though he cannot state which fingers specifically.  Symptoms have been present for 6 months and are worsening.  They only affect him at night or when he is trying to sleep.  His symptoms are most consistent with carpal tunnel syndrome bilaterally.  The cervical foraminal stenosis seen on his MRI is likely incidental.  He is neurologically intact and without any red flag symptoms.    NDI:  2 %  WHO-5:  24 (The patient is not interested in behavioral health therapy)    PLAN:  A shared decision making model was used.  The patient's values and choices were respected.  The following represents what was discussed and decided upon by the physician and the patient.  His wife, Mira, was present for the entire encounter.    1.  DIAGNOSTIC TESTS: The patient's MRI of the cervical spine from August 18, 2020 was personally reviewed today by the physician with the physician performing her own interpretation.  Patient has left greater than right C2-3 facet arthropathy which does cause mild left C2-3 foraminal stenosis.  There is disc degeneration seen at the C3-4 level with left greater than right C3-4 facet arthropathy resulting in left greater than right C3-4 foraminal stenosis.  There is significant disc degeneration at the C4-5 and C5-6 levels.  Left greater than right facet arthropathy at the C4-5 level causing left greater than right C4-5 foraminal stenosis.  There is severe bilateral C5-6 foraminal stenosis.  There is moderate disc degeneration seen at the C6-7 level with bilateral  facet arthropathy and left and right foraminal stenosis.  There is facet arthropathy seen at the C7-T1 level.  There is mild central canal stenosis seen at the C3-4 and C5-6 levels, but no severe central canal stenosis at any level.  Cervical lordosis is largely maintained.  The cord signal is normal throughout.  The images were shown to the patient the findings were explained using a spine model.    -An EMG of the bilateral upper extremities today is ordered for further work-up regarding the paresthesias.  2.  PHYSICAL THERAPY:   Physical therapy was not ordered today, as his etiology of pain is thought to be carpal tunnel syndrome, and not from the cervical spine.  Can consider physical therapy in the future if etiology of pain points more to the cervical spine.  3.  MEDICATIONS: No medications were prescribed today, as patient really does not have pain, he only has paresthesias.  Gabapentin has not been shown to be effective in the literature for carpal tunnel syndrome.  Could consider this in the future if his sleep is significantly disrupted secondary to the paresthesias.  Would start with gabapentin 100 mg at bedtime (given his history of chronic kidney disease) and then titrate up to a maximum of 600 mg at bedtime if needed.  4.  INTERVENTIONS: Discussed with the patient that if he does have nerve damage secondary to carpal tunnel syndrome, that he may benefit from carpal tunnel corticosteroid injections.  Will await the results of the EMG before considering something like this.  -If he failed to have treatment for the carpal tunnel syndrome, then cervical epidural steroid injections could be considered.  However cervical radiculitis is very low on the differential diagnosis list.  5.  PATIENT EDUCATION:   -Discussed with the patient that his symptoms are most consistent with carpal tunnel syndrome.  Explained the carpal tunnel syndrome symptoms typically occur at night while sleeping.  Daytime activities  that aggravate carpal tunnel syndrome include working on the computer or driving.  Discussed that cervical radicular pain is typically very painful.  Patients typically do not only have paresthesia type symptoms.  Radicular pain also occurs mainly in the day with certain activities.  Patient denies any pain with activities during the day.  -Discussed that he could trial treatment for the carpal tunnel syndrome before proceeding with the EMG.  This would be wearing over-the-counter wrist splints/braces at bedtime while he sleeps.  He does not have to wear the braces during the day.  Would recommend wearing these for 4 to 6 weeks to see if symptoms improve.  He can wait to purchase these so if he would like, to see with the results of the EMG show.  -All of his and his wife's questions were answered to their satisfaction today.  They were in agreement with the treatment plan.  6.  FOLLOW-UP:   Patient follow-up first available for the EMG with Dr. Hernandez.  He is asked to follow-up with Dr. Jaramillo after the EMG to review the results and discuss treatment options at that time.  If there are any questions/concerns or any significant worsening of pain prior to that time, the patient is asked to call the clinic via the nurse navigation line or via Hands.         SUBJECTIVE:  Cali Gama  Is a 75 y.o. male who presents today for new patient evaluation of paresthesias in the bilateral upper extremities.  The patient states that the symptoms started about 6 months ago.  He denies any injury or specific activity that correlated with the onset of the symptoms.  He reports that it has been getting worse.  He says that he gets a tingling from the elbows down to his hands only at night.  The left arm is slightly worse than the right.  He says that it goes into the fingers as well, but he has not paid attention to which fingers are affected.  He denies any pain in the neck or in the upper extremities.  He denies any weakness  in the upper extremities.  He states that the symptoms will wake him up once or twice at night.  He describes the symptoms as annoying.  He says that he is able to go back to sleep fairly quickly, and it does not prevent him from sleeping.  Again the symptoms only happen at night.  He denies any symptoms during the day, other than if he is trying to sleep in the recliner during the day he can get the paresthesias/tingling in the extremities at that time.  He says that if he can sleep with pillows underneath his forearms and hands, this will prevent him from waking up secondary to the tingling.    Does note that he has sometimes had some muscle pain in the right side of his neck.  This typically only occurs if he is leaning forward for long period of time.  He reports that if he stops that activity and then relaxes, the pain will go away fairly quickly.  He reports that this has been happening less frequently recently, though it was a problem for a while.    He denies any paresthesias or pain in any other parts of the body.    Medications:  Reviewed and correct in the chart.     Allergies: Reviewed and Cipro causes tendon aches and Profen causes urinary retention.    PMH:  Reviewed and significant for stage II chronic kidney disease, benign prostatic hypertrophy, irritable bowel syndrome, osteoarthritis , macular degeneration.    PSH:  Reviewed and significant for bilateral knee replacements, right ankle surgery, cholecystectomy, left shoulder surgery, right hand surgery x3.  He does state that he will be undergoing a TURP procedure next week.    Family History:  Reviewed and significant for bladder cancer in his father.    Social History: The patient is  and retired.  He drinks alcohol about once per month.  He denies any tobacco or illicit drug use.    ROS: Positive for chronic sexual dysfunction (erectile dysfunction and decreased libido), changes in vision related to macular degeneration, constipation,  reflux, difficulty urinating (secondary to benign prostatic hypertrophy).  Specifically negative for bowel/bladder retention, dysphagia, imbalance/falls, difficulty with fine motor skills.  Otherwise 13 systems reviewed are negative.  Please see the patient's intake questionnaire from today for details.      OBJECTIVE:  PHYSICAL EXAMINATION:  CONSTITUTIONAL:  Vital signs as above.  No acute distress.  The patient is well nourished and well groomed.  PSYCHIATRIC:  The patient is awake, alert, oriented to person, place, time and answering questions appropriately with clear speech.  He has a flat affect.  HEENT:  Sclera are non-injected.  Extraocular muscles are intact. .  Moist oral mucosa.  SKIN:  Skin over the face, bilateral upper extremities, and posterior torso is clean, dry, intact without rashes.  LYMPH NODES:  No palpable or tender anterior/posterior cervical, submandibular, or supraclavicular lymph nodes.    MUSCLE STRENGTH:  5/5 strength for the bilateral shoulder abductors, elbow flexors/extensors, wrist extensors, finger flexors/abductors.  NEURO:  2/4 symmetric biceps, brachioradialis, triceps reflexes bilaterally.  Sensation to pinprick is intact in all of the digits of both upper extremities.  Negative Barreto's bilaterally.    VASCULAR:  2/4 radial pulses bilaterally.  Warm upper limbs bilaterally.  Capillary refill in the upper extremities is less than 1 second.  MUSCULOSKELETAL: He denies any tenderness to palpation over the bilateral cervical paraspinal muscles or over the upper trapezius muscles.  Range of motion of the cervical spine is largely normal with flexion.  He has moderate to severe restriction with cervical extension, bilateral cervical sidebending and mild to moderate restriction with bilateral cervical rotation.  He denies any pain with cervical range of motion in any plane.  Spurling's test is negative bilaterally for any reproduction of paresthesias in the upper extremities.  Carpal  compression test is negative bilaterally.  Tinel's at the wrist and at the elbow is negative bilaterally.  Phalen's test is negative bilaterally.

## 2021-06-11 NOTE — PROGRESS NOTES
"[facility] Daily Progress     Patient Name: Cali Gama  Date: 2017  Visit #: 5  PTA visit #:    Referral Diagnosis: Thoracic back pain  Referring provider: Yasmany Orellana PA-C  Visit Diagnosis:     ICD-10-CM    1. Pain in thoracic spine M54.6    2. Neck pain on right side M54.2    3. Abnormal posture R29.3    4. Generalized muscle weakness M62.81          Assessment:     Patient is benefitting from skilled physical therapy and is making steady progress toward functional goals.  Patient is appropriate to continue with skilled physical therapy intervention, as indicated by initial plan of care.     Pt having increased pain over weekend. Pt has been performing exercises regularly, but possibly too many times throughout the day. Pt to hold off on strengthening exercises until next session to determine if pain is attributed to exercises. Pt to continue with stretching exercises. Pt continues to tolerate MedX well without any increases in pain during sessions.     Goal Status:  Pt will: be independent with HEP within 3 weeks.  (Goal Met)  Pt will: be able to bend/lift without pain within 8 weeks. (In progress)  Pt will: improve MedX max torque by 30# or more to demonstrate improved strength within 8 weeks. (In progress)  Pt will: decrease ANN by 30% to demonstrate improved function within 8 weeks. (In progress)    Plan / Patient Education:     Continue with initial plan of care.  Progress with home program as tolerated.     Next session: continue MedX DE and rotary torso, continue light stretching of neck and back, progress prone exercises as able    Subjective:     Pain Ratin  Pt says that he had more pain over the weekend and after last session. \"This doesn't feel that it is related to the muscles at all.\"      Objective:     Cervical MedX Initial testin17 4-week Re-test   AROM (full= 0-120  cervical)  deg    Max Extension Torque  293#    Flex: ext ratio (ideal 1.4:1) 3.58:1  "     Exercises on hold until next session, to continue with stretches as needed    No pain increases during session       Treatment Today     TREATMENT MINUTES COMMENTS   Evaluation     Self-care/ Home management     Manual therapy     Neuromuscular Re-education     Therapeutic Activity     Therapeutic Exercises 25 See exercises.    Gait training     Modality__________________                Total 25    Blank areas are intentional and mean the treatment did not include these items.       Tom Murray, PT, DPT  6/19/2017

## 2021-06-11 NOTE — PROGRESS NOTES
"[facility] Daily Progress     Patient Name: Cali Gama  Date: 2017  Visit #: 2  PTA visit #:    Referral Diagnosis: Thoracic back pain  Referring provider: Yasmany Orellana PA-C  Visit Diagnosis:     ICD-10-CM    1. Pain in thoracic spine M54.6    2. Neck pain on right side M54.2    3. Abnormal posture R29.3    4. Generalized muscle weakness M62.81          Assessment:     Patient is benefitting from skilled physical therapy and is making steady progress toward functional goals.  Patient is appropriate to continue with skilled physical therapy intervention, as indicated by initial plan of care.     Pt tolerating MedX DE well and performing appropriate number of repetitions. Pt having increased muscle soreness today, but believed to be related to muscle fatigue. Pt having less pain with exercises and massage to middle and lower back musculature. Pt has been compliant with HEP thus far.    Goal Status:  Pt will: be independent with HEP within 3 weeks.   Pt will: be able to bend/lift without pain within 8 weeks.  Pt will: improve MedX max torque by 30# or more to demonstrate improved strength within 8 weeks.  Pt will: decrease ANN by 30% to demonstrate improved function within 8 weeks.    Plan / Patient Education:     Continue with initial plan of care.  Progress with home program as tolerated.     Next session: continue MedX DE and rotary torso, review rows and shoulder extensions, continue light stretching of neck and back, trial prone exercises    Subjective:     Pain Ratin  Pt shares that he was more sore after last session. \"I'm not sure if my muscles just aren't used to working like that.\" Pt says the he continues to do the exercises regularly.       Objective:     Cervical MedX Initial testin17 4-week Re-test   AROM (full= 0-120  cervical)  deg    Max Extension Torque  293#    Flex: ext ratio (ideal 1.4:1) 3.58:1      /10 pain at end of session    Increased tightness and " tenderness to lumbar paraspinals, more on L    No increases in pain with MedX DE or rotary torso, appropriate repetitions    Treatment Today     TREATMENT MINUTES COMMENTS   Evaluation     Self-care/ Home management     Manual therapy 10 In prone:  - STM/MFR to thoracic and lumbar paraspinals   Neuromuscular Re-education     Therapeutic Activity     Therapeutic Exercises 18 See exercises. Added shoulder extensions and seated back stretch to HEP.   Gait training     Modality__________________                Total 28    Blank areas are intentional and mean the treatment did not include these items.       Tom Murray, PT, DPT  6/8/2017

## 2021-06-11 NOTE — PROGRESS NOTES
Clinic Care Coordination Contact  Community Health Worker Initial Outreach    CHW Initial Information Gathering:  Referral Source: Other, specify  Preferred Hospital: [facility]  [phone]  Preferred Urgent Care: Other  Current living arrangement:: I live in a private home       Patient accepts CC: Yes. Patient scheduled for assessment with CCC KAY on 9/8/2020  at 9:00AM. Patient noted desire to discuss BPCI-A.

## 2021-06-11 NOTE — ANESTHESIA PREPROCEDURE EVALUATION
Anesthesia Evaluation      Patient summary reviewed   No history of anesthetic complications     Airway   Mallampati: II  Neck ROM: full   Pulmonary - negative ROS and normal exam   (+) a smoker (former)  (-) pneumonia                         Cardiovascular - negative ROS and normal exam   Neuro/Psych - negative ROS     Endo/Other    (+) arthritis,      GI/Hepatic/Renal    (+) GERD well controlled and intermittent,   chronic renal disease (CKD 2),     Comments: BPH     Other findings: Incidental cervical spine stenosis - asymptomatic without concerning symptoms        Dental - normal exam   (+) caps                       Anesthesia Plan  Planned anesthetic: general LMA  Versed/fentanyl/propofol  Ketamine PRN  Decadron/zofran    Neutral head positioning    ASA 2   Induction: intravenous   Anesthetic plan and risks discussed with: patient  Anesthesia plan special considerations: antiemetics,   Post-op plan: routine recovery      Results for orders placed or performed in visit on 09/11/20   COVID-19 Virus PCR MRF    Specimen: Respiratory   Result Value Ref Range    COVID-19 VIRUS SPECIMEN SOURCE Nasopharyngeal     2019-nCOV Not Detected        Chemistry        Component Value Date/Time     08/25/2020 0522    K 3.8 08/25/2020 0522     (H) 08/25/2020 0522    CO2 22 08/25/2020 0522    BUN 8 08/25/2020 0522    CREATININE 1.11 08/25/2020 0522     08/25/2020 0522        Component Value Date/Time    CALCIUM 8.5 08/25/2020 0522    ALKPHOS 100 08/23/2020 0924    AST 37 08/23/2020 0924    ALT 26 08/23/2020 0924    BILITOT 1.6 (H) 08/23/2020 0924        Lab Results   Component Value Date    WBC 9.2 08/25/2020    HGB 16.7 09/10/2020    HCT 43.4 08/25/2020    MCV 94 08/25/2020     08/25/2020

## 2021-06-11 NOTE — ANESTHESIA POSTPROCEDURE EVALUATION
Patient: Cali Gama  Procedure(s):  CYSTOSCOPY, BIPOLAR TRANSURETHRAL RESECTION OF PROSTATE  Anesthesia type: general    Patient location: PACU  Last vitals:   Vitals Value Taken Time   /69 9/15/2020  2:30 PM   Temp 36.9  C (98.4  F) 9/15/2020  2:30 PM   Pulse 74 9/15/2020  2:30 PM   Resp 14 9/15/2020  2:30 PM   SpO2 98 % 9/15/2020  2:30 PM     Post vital signs: stable  Level of consciousness: awake and responds to simple questions  Post-anesthesia pain: pain controlled  Post-anesthesia nausea and vomiting: no  Pulmonary: unassisted, return to baseline  Cardiovascular: stable and blood pressure at baseline  Hydration: adequate  Anesthetic events: no    QCDR Measures:  ASA# 11 - Shaylee-op Cardiac Arrest: ASA11B - Patient did NOT experience unanticipated cardiac arrest  ASA# 12 - Shaylee-op Mortality Rate: ASA12B - Patient did NOT die  ASA# 13 - PACU Re-Intubation Rate: ASA13B - Patient did NOT require a new airway mgmt  ASA# 10 - Composite Anes Safety: ASA10A - No serious adverse event    Additional Notes:

## 2021-06-11 NOTE — ANESTHESIA CARE TRANSFER NOTE
Last vitals:   Vitals:    09/15/20 1305   BP: 138/84   Pulse: 83   Resp: 16   Temp: 37.5  C (99.5  F)   SpO2: 99%     Patient's level of consciousness is awake  Spontaneous respirations: yes  Maintains airway independently: yes  Dentition unchanged: yes  Oropharynx: oropharynx clear of all foreign objects    QCDR Measures:  ASA# 20 - Surgical Safety Checklist: WHO surgical safety checklist completed prior to induction    PQRS# 430 - Adult PONV Prevention: 4558F - Pt received => 2 anti-emetic agents (different classes) preop & intraop  ASA# 8 - Peds PONV Prevention: NA - Not pediatric patient, not GA or 2 or more risk factors NOT present  PQRS# 424 - Shaylee-op Temp Management: 4559F - At least one body temp DOCUMENTED => 35.5C or 95.9F within required timeframe  PQRS# 426 - PACU Transfer Protocol: - Transfer of care checklist used  ASA# 14 - Acute Post-op Pain: ASA14B - Patient did NOT experience pain >= 7 out of 10

## 2021-06-11 NOTE — PROGRESS NOTES
[facility]  [address]  [facility] [address]  Dept: [phone]  Dept Fax: [phone]  Primary Provider: Urban Barker MD  Pre-op Performing Provider: VICTORINA MARTIN    PREOPERATIVE EVALUATION:  Today's date: 9/10/2020    Cali Gama is a 75 y.o. male who presents for a preoperative evaluation.    Surgical Information:  No flowsheet data found.  Fax number for surgical facility: [facility]   Type of Anesthesia Anticipated: General    Subjective     HPI related to upcoming procedure: Patient complains of slowly urinary stream.  He does not feel as though he is fully emptying his bladder.  He recently underwent cystoscopy in a urology office.  He was diagnosed with a urinary tract infection and prescribed doxycycline.  Patient presented to the emergency room on 8/24/2020 with fever, chills, rigors.  Patient was hospitalized and treated with IV fluids, IV ceftriaxone and Myers catheter placement.  He was discharged with oral Bactrim for 10 days.  Patient is taking Flomax.  He has a history of a TURP in 2018.    Patient takes famotidine for GERD.    No flowsheet data found.      Patient does not have a Health Care Directive or Living Will: Discussed advance care planning with patient; information given to patient to review.    [place] reviewed:  reviewed - no concerns    See problem list for active medical problems.  Problems all longstanding and stable, except as noted/documented.  See ROS for pertinent symptoms related to these conditions.      Review of Systems  Constitutional, neuro, ENT, endocrine, pulmonary, cardiac, gastrointestinal, genitourinary, musculoskeletal, integument and psychiatric systems are negative, except as otherwise noted.      Patient Active Problem List    Diagnosis Date Noted     UTI (urinary tract infection) 08/23/2020     Sepsis, due to unspecified organism, unspecified whether acute organ dysfunction present (H)      Benign prostatic hyperplasia  with lower urinary tract symptoms, symptom details unspecified      Thyroid nodule 08/15/2018     Stage 2 chronic kidney disease 07/29/2015     GERD (gastroesophageal reflux disease) 07/29/2015     Irritable Bowel Syndrome      BPH with urinary obstruction      Hypertrophy Of Breast      Osteoarthritis Of The Knee      Joint Pain, Localized In The Left Shoulder      Blood Pressure Isolated Elevated      Past Medical History:   Diagnosis Date     BPH with urinary obstruction      Chronic kidney disease      GERD (gastroesophageal reflux disease) 7/29/2015     Irritable bowel syndrome     Created by Conversion      Osteoarthritis Of The Knee     Created by Conversion  Replacement Utility updated for latest IMO load     Stage 2 chronic kidney disease 7/29/2015     Thyroid nodule 8/15/2018    right side, 2 cm discovered on 8/15/18     Past Surgical History:   Procedure Laterality Date     CHOLECYSTECTOMY       JOINT REPLACEMENT       Current Outpatient Medications   Medication Sig Dispense Refill     acetaminophen (TYLENOL) 500 MG tablet Take 500 mg by mouth every 6 (six) hours as needed for pain.       diphenhydrAMINE (BENADRYL) 25 mg capsule Take 25 mg by mouth at bedtime as needed for itching.        famotidine (FOR PEPCID) 10 MG tablet Take 10 mg by mouth every evening.        ibuprofen (ADVIL,MOTRIN) 200 MG tablet Take 400 mg by mouth every 6 (six) hours as needed for pain.       tamsulosin (FLOMAX) 0.4 mg cap Take 2 capsules (0.8 mg total) by mouth Daily after breakfast. (Patient taking differently: Take 0.4 mg by mouth 2 (two) times a day. ) 180 capsule 0     No current facility-administered medications for this visit.        Allergies   Allergen Reactions     Ciprofloxacin Other (See Comments)     tendonitis     Morphine      Patient states that he cannot urinate after taking morphine.       Social History     Tobacco Use     Smoking status: Former Smoker     Smokeless tobacco: Never Used   Substance Use Topics      Alcohol use: Yes     Alcohol/week: 2.0 standard drinks     Types: 2 Standard drinks or equivalent per week      Family History   Problem Relation Age of Onset     Cancer Father         bladder     Aneurysm Father      Dementia Mother      No Medical Problems Sister      No Medical Problems Daughter      No Medical Problems Maternal Grandmother      No Medical Problems Maternal Grandfather      No Medical Problems Paternal Grandmother      No Medical Problems Paternal Grandfather      No Medical Problems Maternal Aunt      No Medical Problems Paternal Aunt      BRCA 1/2 Neg Hx      Breast cancer Neg Hx      Colon cancer Neg Hx      Endometrial cancer Neg Hx      Ovarian cancer Neg Hx      Social History     Substance and Sexual Activity   Drug Use No           Objective   /80 (Patient Site: Right Arm, Cuff Size: Adult Large)   Pulse 74   Wt 200 lb 1.6 oz (90.8 kg)   SpO2 99%   BMI 29.55 kg/m    Physical Exam    GENERAL APPEARANCE: healthy, alert and no distress     EYES: EOMI,  PERRL     HENT: ear canals and TM's normal and nose and mouth without ulcers or lesions     NECK: no adenopathy, no asymmetry, masses, or scars and thyroid normal to palpation     RESP: lungs clear to auscultation - no rales, rhonchi or wheezes     CV: regular rates and rhythm, normal S1 S2, no S3 or S4 and no murmur, click or rub     ABDOMEN:  soft, nontender, no HSM or masses and bowel sounds normal     MS: extremities normal- no gross deformities noted, no evidence of inflammation in joints, FROM in all extremities.     SKIN: no suspicious lesions or rashes     NEURO: Normal strength and tone, sensory exam grossly normal, mentation intact and speech normal     PSYCH: mentation appears normal. and affect normal/bright     LYMPHATICS: No cervical adenopathy    Recent Labs   Lab Test 09/10/20  0949 08/25/20  0522 08/24/20  0604   HGB 16.7 14.6 13.8*   PLT  --  185 172   NA  --  139 140   K  --  3.8 4.0   CREATININE  --  1.11 1.22         PRE-OP Diagnostics:   Labs pending at this time. Results will be reviewed when available.  EKG on 8/23/2020 showed normal sinus rhythm, inferior infarct.  No significant change was found when compared with EKG on 6/12/19.        Assessment & Plan       The proposed surgical procedure is considered INTERMEDIATE risk.    REVISED CARDIAC RISK INDEX   The patient has the following serious cardiovascular risks for perioperative complications:  No serious cardiac risks = 0 points    INTERPRETATION: 0 points: Class I (very low risk - 0.4% complication rate)    1. Preop general physical exam  - Hemoglobin  He is instructed to avoid NSAIDs.  He will continue over-the-counter acetaminophen as needed.    2. BPH with urinary obstruction  Patient continues tamsulosin.    3. Acute cystitis without hematuria  Patient continues Bactrim as prescribed per recent hospitalization.    4. Gastroesophageal reflux disease without esophagitis  This is controlled with famotidine.      The patient has the following additional risks and recommendations for perioperative complications:     - Consult Hospitalist / IM to assist with post-op medical management   - History of urinary retention     MEDICATION INSTRUCTIONS:    NSAIDS:   - IBUPROFEN (Advil, Motrin): HOLD 1 day before surgery.     RECOMMENDATION:  APPROVAL GIVEN to proceed with proposed procedure, without further diagnostic evaluation.    No follow-ups on file.    Signed Electronically by: Sandy Wilcox CNP    Copy of this evaluation report is provided to requesting physician.    [facility] Preop Guidelines    Revised Cardiac Risk Index

## 2021-06-11 NOTE — PROGRESS NOTES
"[facility] Daily Progress     Patient Name: Cali Gama  Date: 2017  Visit #: 3  PTA visit #:    Referral Diagnosis: Thoracic back pain  Referring provider: Yasmany Orellana PA-C  Visit Diagnosis:     ICD-10-CM    1. Pain in thoracic spine M54.6    2. Neck pain on right side M54.2    3. Abnormal posture R29.3    4. Generalized muscle weakness M62.81          Assessment:     Patient is benefitting from skilled physical therapy and is making steady progress toward functional goals.  Patient is appropriate to continue with skilled physical therapy intervention, as indicated by initial plan of care.     Pt has been progressing well. Pt having less overall pain and only soreness in the mornings that is most notable. Pt has been tolerating exercises and MedX well as well. Pt plans to slowly return to normal weight lifting routine. Pt educated on the importance of core stability and how this may relate to his back and neck pain.     Goal Status:  Pt will: be independent with HEP within 3 weeks.  (Goal Met)  Pt will: be able to bend/lift without pain within 8 weeks. (In progress)  Pt will: improve MedX max torque by 30# or more to demonstrate improved strength within 8 weeks. (In progress)  Pt will: decrease ANN by 30% to demonstrate improved function within 8 weeks. (In progress)    Plan / Patient Education:     Continue with initial plan of care.  Progress with home program as tolerated.     Next session: continue MedX DE and rotary torso, review TA sets, continue light stretching of neck and back, review prone Ts    Subjective:     Pain Rating: soreness  Pt says that he continues to notice soreness when waking up in the morning. Pt shares that once he gets up and moving that the pain gets better. \"It used to torment me throughout the day, but now it doesn't.\"      Objective:     Cervical MedX Initial testin17 4-week Re-test   AROM (full= 0-120  cervical)  deg    Max Extension Torque  293#  "   Flex: ext ratio (ideal 1.4:1) 3.58:1      No tenderness in thoracic or lumbar paraspinals    Increased difficulty with prone scapular exercises    No pain after MedX DE    Treatment Today     TREATMENT MINUTES COMMENTS   Evaluation     Self-care/ Home management     Manual therapy     Neuromuscular Re-education     Therapeutic Activity     Therapeutic Exercises 25 See exercises. Added prone Ts to HEP.   Gait training     Modality__________________                Total 25    Blank areas are intentional and mean the treatment did not include these items.       Tom Murray, PT, DPT  6/12/2017

## 2021-06-11 NOTE — PROGRESS NOTES
Clinic Care Coordination Contact    Community Health Worker Follow Up    Goals:   Goals Addressed                 This Visit's Progress       Patient Stated      Psychosocial (pt-stated)        Goal Statement:  I want to stay out of the hospital for the next 90 days.  Date Goal set:  9/8/2020  Barriers:  Recent hospitalization(s),   Strengths:  Able to identify and advocate for needs, good support system from family  Date to Achieve By:11/24/2020 (based on hospital discharge date of 8/26/20)  Patient expressed understanding of goal:  Yes  Action steps to achieve this goal:  1. I will take my medications as prescribed (continued)  2. I will attend all my appts as scheduled, and recommended follow up appts  3. I will call my clinic if I start to feel sick or notice any change(s) in my health, and schedule an appt if needed  4. I will call the triage nurse line for advice, before going to the hospital  5. I will go to  or Walk-In-Clinic before going to the hospital, if I am unable to get an appt with my PCP  6. I will update the Care Coordination team during outreach calls and ask for additional support or resources, if needed    Updated:9/30/2020            CHW Next Follow-up: 2 weeks    Patient stated that he is doing well ,no new questions or concerns reported during the  outreach call.

## 2021-06-11 NOTE — TELEPHONE ENCOUNTER
New Appointment Needed  What is the reason for the visit:    Pre-Op Appt Request  When is the surgery? :  9/15/20  Where is the surgery?:   [facility]   Who is the surgeon? :  Dr. Reynolds   What type of surgery is being done?: Resection of Prostate   Provider Preference: PCP only  How soon do you need to be seen?: within the next two weeks  Waitlist offered?: No  Okay to leave a detailed message:  Yes

## 2021-06-11 NOTE — PROGRESS NOTES
[facility] Certification Request    June 5, 2017      Patient: Cali Gama  MR Number: [id number]  YOB: 1945  Date of Visit: 6/5/2017    Thank you for this referral.   We are seeing Cali Gama for Physical Therapy of neck and thoracic pain.    Medicare and/or Medicaid requires physician review and approval of the treatment plan. Please review the plan of care and verify that you agree with the therapy plan of care by co-signing this note.      Plan of Care  Authorization / Certification Start Date: 06/05/17  Authorization / Certification End Date: 09/05/17  Authorization / Certification Number of Visits: 90 day certification  Communication with: Referral Source  Patient Related Instruction: Nature of Condition;Body mechanics;Posture;Treatment plan and rationale;Precautions;Next steps;Self Care instruction;Expected outcome;Basis of treatment  Times per Week: 1-2  Number of Weeks: 8-12  Number of Visits: 14-16  Discharge Planning: Pt will be discharged when all goals are met, lack of progress or worsening condition, MD referral, and/or pt desire to continue with HEP independently.  Precautions / Restrictions : none  Therapeutic Exercise: ROM;Stretching;Strengthening  Neuromuscular Reeducation: posture  Manual Therapy: soft tissue mobilization;myofascial release;joint mobilization;muscle energy;strain counterstrain  Modalities: TENS;iontophoresis;ultrasound;cold pack;hot pack  Functional Training (ADL's): self care;meal prep;ADL's;instructions for equipment;ergonomics;instructions in transfers  Equipment: theraband;TENS unit    Goals:  No Data Recorded  Pt will: be independent with HEP within 3 weeks.   Pt will: be able to bend/lift without pain within 8 weeks.  Pt will: improve MedX max torque by 30# or more to demonstrate improved strength within 8 weeks.  Pt will: decrease ANN by 30% to demonstrate improved function within 8 weeks.      If you have any questions or concerns, please  don't hesitate to call.    Sincerely,      Tom Murray, PT        Physician recommendation:     ___ Follow therapist's recommendation        ___ Modify therapy      *Physician co-signature indicates they certify the need for these services furnished within this plan and while under their care.        [facility]   Cervical Thoracic Initial Evaluation    Patient Name: Cali Gmaa  Date of evaluation: 6/5/2017  Referral Diagnosis: Thoracic back pain  Referring provider: Yasmany Orellana PA-C  Visit Diagnosis:     ICD-10-CM    1. Pain in thoracic spine M54.6    2. Neck pain on right side M54.2    3. Abnormal posture R29.3    4. Generalized muscle weakness M62.81        Assessment:     Cali Gama is a 72 y.o. male who presents to therapy today with chief complaints of thoracic and R sided neck pain that has been ongoing for about a year after bending/lifting heavy items during yard work. Pt demonstrating general limitations with spine ROM and neck tightness/stiffness. Pt demonstrates general weakness and poor posture that may be related to pain. Pt also having occasional tingling into R arm and hand, but is not his main concern. Pt having most thoracic pain with bending and lifting, but pain comes and goes. Pt reported h/o RTC repair, TKA bilaterally, and chronic kidney disease.  Pain symptoms are not improving.  Functional impairments include bending, lifting, ADLs.     Impairments in  pain, posture, ROM, joint mobility, strength, ADL's  The POC is dynamic and will be modified on an ongoing basis.  Patient will return to clinic if symptoms persist.  Barriers to achieving goals as noted in the assessment section may affect outcome.  Prognosis to achieve goals is  good   Skilled PT is required to improve ROM, strength, posture, mobility, ADL function, and reduce pain.  Pt. is appropriate for skilled PT intervention as outlined in the Plan of Care (POC).  Pt. is a good candidate for skilled PT services to  improve pain levels and function.    Goals:  Pt will: be independent with HEP within 3 weeks.   Pt will: be able to bend/lift without pain within 8 weeks.  Pt will: improve MedX max torque by 30# or more to demonstrate improved strength within 8 weeks.  Pt will: decrease ANN by 30% to demonstrate improved function within 8 weeks.    Patient's expectations/goals are realistic.    Barriers to Learning or Achieving Goals:  No Barriers.       Plan / Patient Instructions:        Plan of Care:   Authorization / Certification Start Date: 06/05/17  Authorization / Certification End Date: 09/05/17  Authorization / Certification Number of Visits: 90 day certification  Communication with: Referral Source  Patient Related Instruction: Nature of Condition;Body mechanics;Posture;Treatment plan and rationale;Precautions;Next steps;Self Care instruction;Expected outcome;Basis of treatment  Times per Week: 1-2  Number of Weeks: 8-12  Number of Visits: 14-16  Discharge Planning: Pt will be discharged when all goals are met, lack of progress or worsening condition, MD referral, and/or pt desire to continue with HEP independently.  Precautions / Restrictions : none  Therapeutic Exercise: ROM;Stretching;Strengthening  Neuromuscular Reeducation: posture  Manual Therapy: soft tissue mobilization;myofascial release;joint mobilization;muscle energy;strain counterstrain  Modalities: TENS;iontophoresis;ultrasound;cold pack;hot pack  Functional Training (ADL's): self care;meal prep;ADL's;instructions for equipment;ergonomics;instructions in transfers  Equipment: theraband;TENS unit    Plan for next visit: review HEP, MedX DE, rotary torso, postural progression, reach and roll     Subjective:   Social information:   Occupation:retired   Work Status:NA   Equipment Available: None    History of Present Illness:    Cali is a 72 y.o. male who presents to therapy today with complaints of thoracic pain that has been ongoing for about a year. Pt was  doing some yard work and bending/lifting heavy things from the ground. Pt says that he tried to lift weights a few months after that and was unable to do it due to pain. Pt also notices occasional R sided neck pain when turning his head. He notices the pain when driving the most. Symptoms are not improving. He denies history of similar symptoms. He describes their previous level of function as not limited    Pain Ratin at rest  Pain rating at best: 0  Pain rating at worst: 8  Pain description: aching    Functional limitations are described as occurring with:   bending  lifting  performing routine daily activities  walking : prolonged    Patient is unsure if anything helps with the pain.         Objective:      Note: Items left blank indicates the item was not performed or not indicated at the time of the evaluation.    Patient Outcome Measures :    No Data Recorded   Scores range from 0-100%, where a score of 0% represents minimal pain and maximal function. The minmal clinically important difference is a score reduction of 10%.    Cervical Thoracic Examination  1. Pain in thoracic spine     2. Neck pain on right side     3. Abnormal posture     4. Generalized muscle weakness       Involved side: Bilateral  Posture Observation:      General sitting posture is  fair.  General standing posture is fair.  Cervical:  Mild forward head    Cervical ROM:    Date: 17     *Indicate scale AROM AROM AROM   Cervical Flexion WFL, tightness     Cervical Extension 18 cm from chest      Right Left Right Left Right Left   Cervical Sidebending 20 deg 20 deg       Cervical Rotation 50 deg 50 deg       Cervical Protraction      Cervical Retraction      Thoracic Flexion      Thoracic Extension      Thoracic Sidebending         Thoracic Rotation           Strength     Date: 17     Cervical Myotomes/5 Right Left Right Left Right Left   Cervical Flexion (C1-2)         Cervical Sidebending (C3)         Shoulder Elevation (C4) 5 5        Shoulder Abduction (C5) 5 5       Elbow Flexion (C6) 5 5       Elbow Extension (C7) 5 5       Wrist Flexion (C7)         Wrist Extension (C6)         Thumb abduction (C8)         Finger Abduction (T1)           Sensation: occasional tingling into L hand, may be due to sleeping position    Flexibility: general stiffness with neck ROM and tightness    Palpation: mild tenderness to bilateral upper traps, PA testing throughout thoracic spine    Passive Mobility-Joint Integrity: Hypomobile.    UE Screen: WFL ROM and strength    Treatment Today     TREATMENT MINUTES COMMENTS   Evaluation 25    Self-care/ Home management     Manual therapy     Neuromuscular Re-education     Therapeutic Activity 25 See exercises. Included MedX testing. Pt educated on future POC and MedX program.   Therapeutic Exercises     Gait training     Modality__________________                Total 50    Blank areas are intentional and mean the treatment did not include these items.     PT Evaluation Code: (Please list factors)  Patient History/Comorbidities: chronic kidney disease, RTC repair, B TKAs  Examination: R sided neck pain, thoracic pain, neck stiffness, poor posture, weakness  Clinical Presentation: stable  Clinical Decision Making: low    Patient History/  Comorbidities Examination  (body structures and functions, activity limitations, and/or participation restrictions) Clinical Presentation Clinical Decision Making (Complexity)   No documented Comorbidities or personal factors 1-2 Elements Stable and/or uncomplicated Low   1-2 documented comorbidities or personal factor 3 Elements Evolving clinical presentation with changing characteristics Moderate   3-4 documented comorbidities or personal factors 4 or more Unstable and unpredictable High          Tom Murray, PT, DPT  6/5/2017  9:53 AM

## 2021-06-11 NOTE — PROGRESS NOTES
"[facility] Daily Progress     Patient Name: Cali Gama  Date: 6/15/2017  Visit #: 4  PTA visit #:    Referral Diagnosis: Thoracic back pain  Referring provider: Yasmany Orellana PA-C  Visit Diagnosis:     ICD-10-CM    1. Pain in thoracic spine M54.6    2. Neck pain on right side M54.2    3. Abnormal posture R29.3    4. Generalized muscle weakness M62.81          Assessment:     Patient is benefitting from skilled physical therapy and is making steady progress toward functional goals.  Patient is appropriate to continue with skilled physical therapy intervention, as indicated by initial plan of care.     Pt continues to have soreness in the mornings, but not lasting more than an hour. Pt will continue with HEP, but limit strengthening exercises to once per day. Pt having stiffness in thoracic mobility and reach/roll exercises. Pt will benefit from further thoracic mobility exercises. Pt progressing well.     Goal Status:  Pt will: be independent with HEP within 3 weeks.  (Goal Met)  Pt will: be able to bend/lift without pain within 8 weeks. (In progress)  Pt will: improve MedX max torque by 30# or more to demonstrate improved strength within 8 weeks. (In progress)  Pt will: decrease ANN by 30% to demonstrate improved function within 8 weeks. (In progress)    Plan / Patient Education:     Continue with initial plan of care.  Progress with home program as tolerated.     Next session: continue MedX DE and rotary torso, continue light stretching of neck and back, progress prone exercises as able    Subjective:     Pain Ratin  Pt shares that he still gets soreness in the mornings. \"I feel better, but bothers me in the mornings.\" He feels that he may be doing the exercises too often.      Objective:     Cervical MedX Initial testin17 4-week Re-test   AROM (full= 0-120  cervical)  deg    Max Extension Torque  293#    Flex: ext ratio (ideal 1.4:1) 3.58:1      Mild tenderness to T7-T8 with " I discussed the patient with the resident, reviewed their findings, assessment, and plan, and agree with the content of their clinical note for today.    Medicare annual wellness visit, subsequent    Pre-op evaluation  - ELECTROCARDIOGRAM 12-LEAD    Screening for colon cancer    Pre-procedural cardiovascular examination  - ELECTROCARDIOGRAM 12-LEAD    Seizure disorder (CMS/HCC)  - CBC WITH DIFFERENTIAL; Future  - BASIC METABOLIC PANEL; Future    H&P, labs, ECG all normal, cleared for procedure.   PAs    Limited mobility with reach and roll, more L to R    Increased difficulty with multifidus walk-outs    Educated to limit exercises to once per day for soreness, continue with stretches    Treatment Today     TREATMENT MINUTES COMMENTS   Evaluation     Self-care/ Home management     Manual therapy     Neuromuscular Re-education     Therapeutic Activity     Therapeutic Exercises 28 See exercises. Added reach and roll to HEP.   Gait training     Modality__________________                Total 28    Blank areas are intentional and mean the treatment did not include these items.       Tom Murray, PT, DPT  6/15/2017

## 2021-06-11 NOTE — PROGRESS NOTES
Assessment:     Diagnoses and all orders for this visit:    Thoracic back pain  -     diclofenac sodium (VOLTAREN) 1 % Gel; Apply 1 application topically 3 (three) times a day.  Dispense: 1 Tube; Refill: 0    Myofascial pain  -     diclofenac sodium (VOLTAREN) 1 % Gel; Apply 1 application topically 3 (three) times a day.  Dispense: 1 Tube; Refill: 0    Fatigue    Cervicalgia  -     diclofenac sodium (VOLTAREN) 1 % Gel; Apply 1 application topically 3 (three) times a day.  Dispense: 1 Tube; Refill: 0    Other orders  -     lidocaine (pf) 20 mg/mL (2 %) injection (XYLOCAINE-MPF); as needed.          Cali Gama is a 72 y.o. y.o. male with past medical history significant for irritable bowel syndrome, benign prostatic hypertrophy, GERD, who presents today for follow-up regarding thoracic pain, and cervicalgia.  Patient reports improvement in neck pain however he still complains of thoracic pain that is worsened with physical therapy.   Thoracic MRI done on July 1 of 2017 shows thoracic spondylosis and disc degeneration at all levels.  There is chronic degenerative endplate changes there is notable T6 through T12.  Patient symptoms of upper thoracic pain are likely a combination of myofascial pain and thoracic facet degenerative joint disease.  I recommend to start with trigger point injection to the T6 through T8 bilaterally.  If no improvement with trigger point injection today, consider T6, T7, T8 medial branch block bilaterally.  I prescribed diclofenac 1% cream to be applied to the upper thoracic paraspinal muscle bilaterally.  Patient verbalizes understanding and agrees with plan.       Plan:     A shared decision making plan was used.  The patient's values and choices were respected.  The following represents what was discussed and decided upon by the physician and the patient.      1.  DIAGNOSTIC TESTS: I reviewed the cervical MRI imaging and the report with the patient today.  Patient verbalizes  understanding.  2.  PHYSICAL THERAPY: Patient will continue on physical therapy.  3.  MEDICATIONS: Patient will start diclofenac 1% gel to the upper thoracic region.    4.  INTERVENTIONS: Bilateral trigger point injection T6 through T6 8 bilaterally today.    5.  PATIENT EDUCATION: I thoroughly discussed the plan with the patient today.  He verbalizes understanding and agrees with the plan.   6.  FOLLOW-UP: Patient will follow up with me in 2 weeks.   All questions were answered.      OSCAR Mary, MPA-C    Subjective:     Cali Gama is a 72 y.o. male who presents today for follow-up  regarding chronic thoracic pain, and neck pain for the last year.  Today patient returns to the clinic complaining of ongoing upper thoracic pain.  He stated that his symptoms are usually worse in the morning and dissipates as the go by.  He stated that she tried to take ibuprofen without significant pain relief.  He describes his pain as burning sensation in the paraspinal muscle in the upper thoracic.  Thoracic MRI done on July 1 of 2017 shows thoracic spondylosis.  There is degenerative changes with mild superimposed endplate edema at the T6-T7, and T3-T4.  No fracture or dislocation.  He reports improvement in the neck pain.  His most concern today is the upper thoracic pain bilaterally.  No significant spinal canal or foraminal stenosis.  Patient denies urinary or bowel incontinence, unintentional weight loss, saddle anesthesia, fever or chills, balance difficulties.      Review of Systems:  Upper thoracic pain. Patient denies urinary or bowel incontinence, unintentional weight loss, saddle anesthesia, fever or chills, balance difficulties.       Objective:   CONSTITUTIONAL:  Vital signs as above.  No acute distress.  The patient is well nourished and well groomed.    PSYCHIATRIC:  The patient is awake, alert, oriented to person, place and time.  The patient is answering questions appropriately with clear speech.  Normal  affect.  SKIN:  Skin over the face, neck bilateral upper extremities is clean, dry, intact without rashes.  MUSCULOSKELETAL: The patient has 5/5 strength for the bilateral shoulder abductors, elbow flexors/extensors, wrist extensors, finger flexors/abductors.   NEUROLOGICAL:  2/4  Biceps, brachioradialis, triceps reflexes bilaterally.  Negative Barreto's bilaterally.  Sensation to pinprick is intact.    RESULTS:      [facility]  MR THORACIC SPINE WO CONTRAST  7/1/2017 12:13 PM      INDICATION: Chronic thoracic pain.  TECHNIQUE: Routine.  CONTRAST: None.  COMPARISON: Thoracic spine radiographs 5/23/2017.     FINDINGS: There are 12 rib-bearing vertebral bodies. Normal alignment and curvature. Endplate irregularities at the T9 level with mild loss of vertebral body height. The vertebral bodies otherwise maintain normal height.     Disc degeneration throughout the thoracic spine with loss of disc height and T2 signal. Associated chronic degenerative endplate changes most notable at T6-T7, T7-T8, T8-T9, T9-T10, and T11-T12. There is mild superimposed endplate edema on the right at   T3-T4 and anteriorly at T6-T7. Marginal osteophytes are most notable at T9-T10 and T11-T12.     The thoracic spinal cord shows normal morphology and signal intensity. No significant spinal canal or foraminal stenosis is seen at any level.     The paraspinal musculature, posterior ribs, and visualized intrathoracic structures are unremarkable.     IMPRESSION:   CONCLUSION:  1.  Thoracic spondylosis. Disc degeneration at all levels. Degenerative endplate changes with mild superimposed endplate edema at T6-T7 and T3-T4. No acute fracture. No significant spinal canal or foraminal stenosis.

## 2021-06-11 NOTE — PROGRESS NOTES
[facility] Daily Progress     Patient Name: Cali Gama  Date: 2017  Visit #: 6  PTA visit #:    Referral Diagnosis: Thoracic back pain  Referring provider: Yasmany Orellana PA-C  Visit Diagnosis:     ICD-10-CM    1. Pain in thoracic spine M54.6    2. Neck pain on right side M54.2    3. Abnormal posture R29.3    4. Generalized muscle weakness M62.81          Assessment:     Patient is benefitting from skilled physical therapy and is making steady progress toward functional goals.  Patient is appropriate to continue with skilled physical therapy intervention, as indicated by initial plan of care.     Pt continues to have thoracic pain that he does not feel is improving. Pt has MRI scheduled for next week and possible injection. Pt to continue with core and LE exercises, but hold thoracic strength exercises until after MRI. Pt has had improvements with neck pain and less tightness. Pt to continue with HEP at this time, but may resume once MRI is performed. Injection is appropriate if needed.    Goal Status:  Pt will: be independent with HEP within 3 weeks.  (Goal Met)  Pt will: be able to bend/lift without pain within 8 weeks. (In progress)  Pt will: improve MedX max torque by 30# or more to demonstrate improved strength within 8 weeks. (In progress)  Pt will: decrease ANN by 30% to demonstrate improved function within 8 weeks. (In progress)    Plan / Patient Education:     Continue with initial plan of care.  Progress with home program as tolerated.     Next session: continue MedX DE and rotary torso, continue light stretching of neck and back, progress prone exercises as able, core progression    Subjective:     Pain Ratin  Pt shares having a little discomfort today. He says he may be having an MRI and possible injection in the future since the pain is not changing much. Pt does feel that his neck is much better. He and his wife would like to hold off on PT until after MRI is  done.      Objective:     Cervical MedX Initial testin17 4-week Re-test   AROM (full= 0-120  cervical)  deg    Max Extension Torque  293#    Flex: ext ratio (ideal 1.4:1) 3.58:1      Able to tolerate core and LE exercises today without increases in pain    Improving neck pain with MedX DE    Holding postural exercises until after MRI results    Treatment Today     TREATMENT MINUTES COMMENTS   Evaluation     Self-care/ Home management     Manual therapy     Neuromuscular Re-education     Therapeutic Activity     Therapeutic Exercises 25 See exercises. Added bridge and clamshells to HEP.   Gait training     Modality__________________                Total 25    Blank areas are intentional and mean the treatment did not include these items.       Tom Murray, PT, DPT  2017

## 2021-06-11 NOTE — PROGRESS NOTES
INJECT TRIGGER POINTS, > 3  Date/Time: 7/5/2017 1:08 PM  Performed by: EVANGELINA ODONNELL  Authorized by: EVANGELINA ODONNELL   Consent: Verbal consent obtained. Written consent obtained.  Risks and benefits: risks, benefits and alternatives were discussed  Consent given by: patient  Patient understanding: patient states understanding of the procedure being performed  Patient consent: the patient's understanding of the procedure matches consent given  Procedure consent: procedure consent matches procedure scheduled  Relevant documents: relevant documents present and verified  Site marked: the operative site was marked  Imaging studies: imaging studies available  Patient identity confirmed: verbally with patient  Local anesthesia used: yes    Anesthesia:  Local anesthesia used: yes  Sedation:  Patient sedated: no    Patient tolerance: Patient tolerated the procedure well with no immediate complications  Comments: 72 y.o. male with trigger point.    Procedure Performed: Trigger point therapy    Brief Procedure: The patient understands the risks and benefits of trigger point therapy. The patient was consented for trigger point therapy.    Procedure:  The upper thoracic ( T6-T7, T7-T8 paraspinal muscle bilaterally) was prepped with alcohol, and chloraprep swab and palpated the tender nodules.  The tender nodules were injected with Lidocaine without DepoMedrol.  There was 3 mL of the local anesthetic into the tender nodule on each side, Total 6 ml.  the patient tolerated the trigger point therapy without any complications.  I have explained to the patient to watch for the signs of infection.  If any signs of infection to call us right away.

## 2021-06-12 NOTE — PROGRESS NOTES
Clinic Care Coordination Contact    Community Health Worker Follow Up    Goals:   Goals Addressed                 This Visit's Progress       Patient Stated      Psychosocial (pt-stated)        Goal Statement:  I want to stay out of the hospital for the next 90 days.  Date Goal set:  9/8/2020  Barriers:  Recent hospitalization(s),   Strengths:  Able to identify and advocate for needs, good support system from family  Date to Achieve By:11/24/2020 (based on hospital discharge date of 8/26/20)  Patient expressed understanding of goal:  Yes  Action steps to achieve this goal:  1. I will take my medications as prescribed (continued)  2. I will attend all my appts as scheduled, and recommended follow up appts (completed)  3. I will call my clinic if I start to feel sick or notice any change(s) in my health, and schedule an appt if needed  4. I will call the triage nurse line for advice, before going to the hospital  5. I will go to  or Walk-In-Clinic before going to the hospital, if I am unable to get an appt with my PCP  6. I will update the Care Coordination team during outreach calls and ask for additional support or resources, if needed    Updated:11/3/2020            CHW Next Follow-up: 2 weeks    CHW Plan: send a message to KAY to call the pt.    Patient stated he is doing well and he is currently in [place].  Besides needing help from [facility] with establishing care with a Urologist in [place] patient has no other questions or concerns.

## 2021-06-12 NOTE — PROGRESS NOTES
Clinic Care Coordination Contact    Follow Up Progress Note    [facility] hospital discharge 8/26/20    Assessment:  called and completed follow up with patient today who shares that he is doing well since his TURP on 9/15 but does report that he is having some issues with incontinence. He has an appointment with his PCP on Friday to discuss this issue.  Patient reports that he will be going to [place] for the winter on Nov 2nd and will establish new care with [facility]. Patient shares that he would prefer a male doctor but when he expressed this he was told that he would not be able to choose. Patient shares that he is more comfortable talking to a male about these issues. CC SW encourages pt to talk with his doctor about this on Friday to see if he can send a specific referral for male doctor. Patient will call me back on Friday after his appt if he needs more help with this.     Goals addressed this encounter:   Goals Addressed                 This Visit's Progress      Psychosocial (pt-stated)   On track     Goal Statement:  I want to stay out of the hospital for the next 90 days.  Date Goal set:  9/8/2020  Barriers:  Recent hospitalization(s),   Strengths:  Able to identify and advocate for needs, good support system from family  Date to Achieve By:11/24/2020 (based on hospital discharge date of 8/26/20)  Patient expressed understanding of goal:  Yes  Action steps to achieve this goal:  1. I will take my medications as prescribed (continued)  2. I will attend all my appts as scheduled, and recommended follow up appts  3. I will call my clinic if I start to feel sick or notice any change(s) in my health, and schedule an appt if needed  4. I will call the triage nurse line for advice, before going to the hospital  5. I will go to  or Walk-In-Clinic before going to the hospital, if I am unable to get an appt with my PCP  6. I will update the Care Coordination team during outreach calls and ask for  additional support or resources, if needed    Updated:9/30/2020                       Plan:    will follow up with patient in one month per [facility] workflow standards.

## 2021-06-12 NOTE — PROGRESS NOTES
ASSESSMENT: Right peroneal tendinitis, status post fibular osteotomy to reroute the tendon    PLAN: X-rays unremarkable.  MRI was ordered.  I will call him with results.  He declines a Tri-Lock ankle brace today.  Seems resistant to physical therapy.      SUBJECTIVE: New patient visit at the [facility] regarding pain behind the right fibula along the course of the peroneal tendons.  He had surgery a few years ago to deepen the peroneal groove at the distal fibula to accommodate tendons.  He did well for a number of years, but has lately developed increasing pain in this area.  He recalls before the surgery that physical therapy was not helpful with his tendinitis.  The surgeon told him that the tendon was significantly attenuated at the time of surgery.  No new trauma.    PHYSICAL EXAM:  General: Pleasant 72 y.o. male in no acute distress.  Vascular: DP pulses are palpable. PT pulses are palpable. Pedal hair is present. Feet are warm to the touch.  Cardiac: Pulse is regular.  Lymphatic: No edema at the ankles.  Neuro: Sensation in the feet is grossly intact to light touch.  Derm: No open lesions.  Musculoskeletal: Minimal tenderness along the peroneal tendon today.  No tenderness at the fibula.  X-ray shows 2 points of fixation of the distal fibula with no visible osteotomy line.  Ankle joint is well-maintained.    Past Medical History:   Diagnosis Date     BPH with urinary obstruction      Chronic kidney disease         has a past surgical history that includes Cholecystectomy and Joint replacement.    Allergies   Allergen Reactions     Morphine        Current Outpatient Prescriptions   Medication Sig Dispense Refill     diclofenac sodium (VOLTAREN) 1 % Gel Apply 1 application topically 3 (three) times a day. 1 Tube 0     famotidine (PEPCID) 20 MG tablet Take 20 mg by mouth 2 (two) times a day.       naproxen (NAPROSYN) 500 MG tablet Take 1 tablet (500 mg total) by mouth 2 (two) times a day with meals. 60  tablet 1     omeprazole (PRILOSEC) 20 MG capsule Take 20 mg by mouth daily before breakfast.       tamsulosin (FLOMAX) 0.4 mg Cp24 TK 1 C PO QD  0     No current facility-administered medications for this visit.        Family History:  family history includes Aneurysm in his father; Cancer in his father; Dementia in his mother; No Medical Problems in his daughter, maternal aunt, maternal grandfather, maternal grandmother, paternal aunt, paternal grandfather, paternal grandmother, and sister. There is no history of BRCA 1/2, Breast cancer, Colon cancer, Endometrial cancer, or Ovarian cancer.    Social History:  Reviewed, and he reports that he has quit smoking. He does not have any smokeless tobacco history on file. He reports that he drinks alcohol. He reports that he does not use illicit drugs.    Review of Systems:  A 12 point comprehensive review of systems was negative except as noted.

## 2021-06-12 NOTE — PROGRESS NOTES
Clinic Care Coordination Contact  [facility]/Voicemail       Clinical Data: Care Coordinator Outreach  Outreach attempted x 1.  Left message on patient's voicemail with call back information and requested return call.  Plan:  will attempt to reach patient again in 10 business days.

## 2021-06-12 NOTE — PROGRESS NOTES
Clinic Care Coordination Contact  [facility]/Voicemail       Clinical Data: Care Coordinator Outreach  Outreach attempted x 1.  Left message on patient's voicemail with call back information and requested return call.  Plan: Care Coordinator will try to reach patient again in 10 business days.

## 2021-06-12 NOTE — PROGRESS NOTES
Assessment:    1. Peroneal tendonitis, right     2. BPH with urinary obstruction     3. Chronic thoracic back pain, unspecified back pain laterality     4. Gastroesophageal reflux disease without esophagitis     5. Chronic kidney disease (CKD), stage 3 (moderate)  Basic Metabolic Panel   6. Vitamin D insufficiency  Vitamin D, Total (25-Hydroxy)         Plan:    Discussed right ankle peroneal tendinitis with prior procedure 2-4 years ago while in [place].  Patient requesting to see podiatry and will see later this afternoon for consideration of right ankle x-ray, physical therapy, etc.  Consider custom orthotics.  Discussed chronic thoracic back pain.  Will follow up with spine care clinic as scheduled.  Continues Pepcid 20 mg 1-2 times daily as needed and no longer on omeprazole.  Recheck basic metabolic panel with CKD stage IIIa with prior creatinine 1.33 and GFR 53.  Also repeat vitamin D level currently on 1000 units daily with prior level of 29.2 noted 2017.        Subjective:    Cali Gama is seen today for evaluation right ankle pain.  Recurrence.  Had procedure 2-4 years ago involving peroneal tendon likely.  Used to snap across lateral malleolus.  Procedures fix this however now pain is returning.  No ankle swelling.  No left ankle involvement.  Also has history of mild renal insufficiency with CKD stage IIIa with creatinine 1.33 and GFR 53.  Using Pepcid 20 mg 1-2 times daily on as-needed basis only and no longer requiring omeprazole for reflux management.  Chronic thoracic back pain noted and does see spine care clinic however recent injections without benefit.  Does have scheduled follow-up.  Using Naprosyn 220 mg twice daily on as-needed basis.  Comprehensive review of systems as above otherwise all negative.    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 Navarro... [facility]   Lives in [place] x 6 months of the year   Hobbies: writing and weight lifting     Past Surgical History:   Procedure Laterality Date     CHOLECYSTECTOMY       JOINT REPLACEMENT          Family History   Problem Relation Age of Onset     Cancer Father      bladder     Aneurysm Father      Dementia Mother      No Medical Problems Sister      No Medical Problems Daughter      No Medical Problems Maternal Grandmother      No Medical Problems Maternal Grandfather      No Medical Problems Paternal Grandmother      No Medical Problems Paternal Grandfather      No Medical Problems Maternal Aunt      No Medical Problems Paternal Aunt      BRCA 1/2 Neg Hx      Breast cancer Neg Hx      Colon cancer Neg Hx      Endometrial cancer Neg Hx      Ovarian cancer Neg Hx         Past Medical History:   Diagnosis Date     BPH with urinary obstruction         Social History   Substance Use Topics     Smoking status: Former Smoker     Smokeless tobacco: None     Alcohol use Yes        Current Outpatient Prescriptions   Medication Sig Dispense Refill     famotidine (PEPCID) 20 MG tablet Take 20 mg by mouth 2 (two) times a day.       naproxen (NAPROSYN) 500 MG tablet Take 1 tablet (500 mg total) by mouth 2 (two) times a day with meals. 60 tablet 1     tamsulosin (FLOMAX) 0.4 mg Cp24 TK 1 C PO QD  0     diclofenac sodium (VOLTAREN) 1 % Gel Apply 1 application topically 3 (three) times a day. 1 Tube 0     omeprazole (PRILOSEC) 20 MG capsule Take 20 mg by mouth daily before breakfast.       No current facility-administered medications for this visit.           Objective:    Vitals:    08/16/17 1452   BP: 124/80    Pulse: 88   Weight: 206 lb (93.4 kg)      Body mass index is 30.87 kg/(m^2).    Alert.  No apparent distress.  Right ankle without edema.  No significant deformity.  No ankle pronation.  The lateral scar well-healed near malleolus.  No significant pes planus deformity.  Achilles tendon insertion normal.  No calcaneal squeeze tenderness.  Left ankle normal.  Some flattening of mid and forefoot with splaying of toes symmetric.  Chest clear.  Cardiac exam regular.

## 2021-06-12 NOTE — PROGRESS NOTES
Assessment:     Diagnoses and all orders for this visit:    Myofascial pain  -     OPS Paravertebral Facet Joint Injection; Future; Expected date: 8/24/17    Thoracic back pain  -     OPS Paravertebral Facet Joint Injection; Future; Expected date: 8/24/17    Facet arthropathy, thoracic  -     OPS Paravertebral Facet Joint Injection; Future; Expected date: 8/24/17          Cali Gama is a 72 y.o. y.o. male with past medical history significant for  irritable bowel syndrome, benign prostatic hypertrophy, GERD, who presents today for follow-up regarding thoracic pain, and cervicalgia.  Today patient reports resolution of the neck pain however he still has left-sided thoracic pain at the T6-T7 region.  Patient did not improve with trigger point injections to the thoracic region. Thoracic MRI done on July 1 of 2017 shows thoracic spondylosis.  There is degenerative changes with mild superimposed endplate edema at the T6-T7, and T3-T4.  His most symptoms are located at the left T6-T7 region.  I recommend to proceed with left T6 -T7 facet steroid injection.  Patient may continue with naproxen 500 mg as needed for pain.       Plan:     A shared decision making plan was used.  The patient's values and choices were respected.  The following represents what was discussed and decided upon by the physician and the patient.      1.  DIAGNOSTIC TESTS: I reviewed the cervical MRI imaging and the report with the patient today.  Patient verbalizes understanding.  2.  PHYSICAL THERAPY: Patient will continue on physical therapy.  3.  MEDICATIONS: Patient will continue on naproxen 500 mg as needed for pain..   4.  INTERVENTIONS: Left T6-T7 facet steroid injection.  5.  PATIENT EDUCATION: I thoroughly discussed the plan with the patient today.  He verbalizes understanding and agrees with the plan.   6.  FOLLOW-UP: Patient will call us and update us 2 weeks after his procedure.   All questions were answered.      OSCAR Mary,  MPA-C    Subjective:     Cali Gama is a 72 y.o. male who presents today for follow-up regarding chronic thoracic pain, and neck pain for the last year.  Today patient returns to the clinic complaining of ongoing upper thoracic pain.  Today patient returns to the clinic stating that his symptoms did not improve with trigger point injection to the thoracic region.  He stated that he tried to take naproxen 500 mg and that seems to help with pain.  Patient states that he weaned off the naproxen and he felt that his thoracic pain returned.  He denies cervicalgia at this time.  At this time he reports 4 out of 10 intensity pain at the left intrascapular region at the T6-T7 region.  Thoracic MRI done on July 1 of 2017 shows thoracic spondylosis.  There is degenerative changes with mild superimposed endplate edema at the T6-T7, and T3-T4.  No fracture or dislocation.  Patient denies urinary or bowel incontinence, unintentional weight loss, saddle anesthesia, fever or chills, balance difficulties.      Review of Systems:  Thoracic pain. Patient denies urinary or bowel incontinence, unintentional weight loss, saddle anesthesia, fever or chills, balance difficulties.       Objective:   CONSTITUTIONAL:  Vital signs as above.  No acute distress.  The patient is well nourished and well groomed.    PSYCHIATRIC:  The patient is awake, alert, oriented to person, place and time.  The patient is answering questions appropriately with clear speech.  Normal affect.  SKIN:  Skin over the face, neck bilateral upper extremities is clean, dry, intact without rashes.  MUSCULOSKELETAL: The patient has 5/5 strength for the bilateral shoulder abductors, elbow flexors/extensors, wrist extensors, finger flexors/abductors.   NEUROLOGICAL:  2/4  Biceps, brachioradialis, triceps reflexes bilaterally.  Negative Barreto's bilaterally.  Sensation to pinprick is intact.    RESULTS:  [facility]  MR THORACIC SPINE WO CONTRAST  7/1/2017 12:13  PM       INDICATION: Chronic thoracic pain.  TECHNIQUE: Routine.  CONTRAST: None.  COMPARISON: Thoracic spine radiographs 5/23/2017.      FINDINGS: There are 12 rib-bearing vertebral bodies. Normal alignment and curvature. Endplate irregularities at the T9 level with mild loss of vertebral body height. The vertebral bodies otherwise maintain normal height.      Disc degeneration throughout the thoracic spine with loss of disc height and T2 signal. Associated chronic degenerative endplate changes most notable at T6-T7, T7-T8, T8-T9, T9-T10, and T11-T12. There is mild superimposed endplate edema on the right at   T3-T4 and anteriorly at T6-T7. Marginal osteophytes are most notable at T9-T10 and T11-T12.      The thoracic spinal cord shows normal morphology and signal intensity. No significant spinal canal or foraminal stenosis is seen at any level.      The paraspinal musculature, posterior ribs, and visualized intrathoracic structures are unremarkable.      IMPRESSION:   CONCLUSION:  1.  Thoracic spondylosis. Disc degeneration at all levels. Degenerative endplate changes with mild superimposed endplate edema at T6-T7 and T3-T4. No acute fracture. No significant spinal canal or foraminal stenosis.

## 2021-06-12 NOTE — PROGRESS NOTES
Clinic Care Coordination Contact    Follow Up Progress Note   BPCI-A: Hospital discharge 8/26/20     Assessment:  called and completed follow-up with patient today who shares that he just got off the phone with his new Urologist in [place]. The new clinic is [facility] on [address] in [place], [place].   Patient also shares that he was able to get a male doctor which is something that is important to him. Patient will have one more appt on Jan 4th in [place] to make sure all is good. Patient is not having any concerns and feels great.  Patient shares that he appreciates the check in.  Goals addressed this encounter:   Goals Addressed                 This Visit's Progress      Psychosocial (pt-stated)   On track     Goal Statement:  I want to stay out of the hospital for the next 90 days.  Date Goal set:  9/8/2020  Barriers:  Recent hospitalization(s),   Strengths:  Able to identify and advocate for needs, good support system from family  Date to Achieve By:11/24/2020 (based on hospital discharge date of 8/26/20)  Patient expressed understanding of goal:  Yes  Action steps to achieve this goal:  1. I will take my medications as prescribed (continued)  2. I will attend all my appts as scheduled, and recommended follow up appts (completed)  3. I will call my clinic if I start to feel sick or notice any change(s) in my health, and schedule an appt if needed  4. I will call the triage nurse line for advice, before going to the hospital  5. I will go to  or Walk-In-Clinic before going to the hospital, if I am unable to get an appt with my PCP  6. I will update the Care Coordination team during outreach calls and ask for additional support or resources, if needed    Updated:11/3/2020            Plan:    will complete last outreach on 11/24 which will be last outreach as patient will be graduating from BPCI-A program.

## 2021-06-13 NOTE — PROGRESS NOTES
Clinic Care Coordination Contact    Situation: Patient chart reviewed by care coordinator.    Background: Patient recently discharged from BPCI-A program. Referral forwarded to CCC at [facility].     Assessment: CCC RN attempted to follow-up with patient to discuss CCC, but was only able to leave a message. BPCI-A SW/'s note says he is currently in [place].     Plan/Recommendations: CCC RN will follow up with patient on 11/30/20.

## 2021-06-13 NOTE — PROGRESS NOTES
Clinic Care Coordination Contact  [facility]/Voicemail       Clinical Data: Care Coordinator Outreach  Outreach attempted x 2.  Left message on patient's voicemail with call back information and requested return call.  Plan: Care Coordinator will send care coordination introduction letter with care coordinator contact information and explanation of care coordination services via mail. Care Coordinator will do no further outreaches at this time.  Isis Montelongo [facility]  Clinic Care Coordinator  [phone]

## 2021-06-13 NOTE — PROGRESS NOTES
Clinic Care Coordination Contact  [facility]/Voicemail       Clinical Data: Care Coordinator Outreach  Outreach attempted x 1.  Left message on patient's voicemail with call back information and requested return call.  Plan: Care Coordinator will do no further outreaches at this time. [facility] SW will complete the  final outreach next week.

## 2021-06-13 NOTE — PROGRESS NOTES
[facility] Discharge Summary  Patient Name: Cali Gama  Date: 9/14/2017  Referral Diagnosis: thoracic pain  Referring provider: Yasmany Orellana PA-C  Visit Diagnosis:   1. Pain in thoracic spine     2. Neck pain on right side     3. Abnormal posture     4. Generalized muscle weakness         Goals:  Pt will: be independent with HEP within 3 weeks.  (Goal Met)  Pt will: be able to bend/lift without pain within 8 weeks. (Not Met)  Pt will: improve MedX max torque by 30# or more to demonstrate improved strength within 8 weeks. (Unable to determine. Was improving.)  Pt will: decrease ANN by 30% to demonstrate improved function within 8 weeks. (Not Met)    Patient was seen for 6 visits from initial evaluation to 6/22/17 with 0 missed appointments.  The patient was instructed to follow up with physician's clinic due to poor progress.  Patient received a home program  The patient discontinued therapy, did not return.  No further therapy is required at this time.    Therapy will be discontinued at this time.  The patient will need a new referral to resume.    Thank you for your referral.  Tom Murray, PT, DPT  9/14/2017  10:18 AM

## 2021-06-13 NOTE — PROGRESS NOTES
Clinic Care Coordination Contact    Clinic Care Coordination Contact    Situation:  Patient chart reviewed by RN Care Coordinator for BPCI-A graduation approval.    Background:  Patient was hospitalized at [facility]  from 8/23 to 8/26 with Sepsis, and has been followed by Care Coordination for 90 day post hospitalization monitoring, per BPCI-A guidelines.            Assessment:  90 day BPCI-A monitoring ended 11/24/20.     11/24/20:Spoke with patient show shares that he in [place] enjoying the 75* weather and feeling great. Patient has his follow up Urology appt set for Jan 4th. Patient has no other needs at this time and Thanks  for all the calls.    Plan: As BPCI-A monitoring completed, RN Care Coordinator placed a new Care Coordination referral through patient's primary care clinic.  CCRC team removed from care team, and BPCI-A episode resolved.       Liana Oakes, Care Coordinator SAMIRA

## 2021-06-16 PROBLEM — N18.30 CHRONIC KIDNEY DISEASE, STAGE 3 (H): Status: ACTIVE | Noted: 2021-05-25

## 2021-06-16 PROBLEM — E04.1 THYROID NODULE: Status: ACTIVE | Noted: 2018-08-15

## 2021-06-16 PROBLEM — N39.0 UTI (URINARY TRACT INFECTION): Status: ACTIVE | Noted: 2020-08-23

## 2021-06-17 NOTE — PATIENT INSTRUCTIONS - HE
Patient Instructions by Urban Barker MD at 9/11/2019  9:40 AM     Author: Urban Barker MD Service: -- Author Type: Physician    Filed: 9/11/2019 10:11 AM Encounter Date: 9/11/2019 Status: Signed    : Urban Barker MD (Physician)         Patient Education   Understanding USDA MyPlate  The USDA (US Department of Agriculture) has guidelines to help you make healthy food choices. These are called MyPlate. MyPlate shows the food groups that make up healthy meals using the image of a place setting. Before you eat, think about the healthiest choices for what to put onto your plate or into your cup or bowl. To learn more about building a healthy plate, visit www.choosemyplate.gov.       The Food Groups    Fruits: Any fruit or 100% fruit juice counts as part of the Fruit Group. Fruits may be fresh, canned, frozen, or dried, and may be whole, cut-up, or pureed. Make half your plate fruits and vegetables.    Vegetables: Any vegetable or 100% vegetable juice counts as a member of the Vegetable Group. Vegetables may be fresh, frozen, canned, or dried. They can be served raw or cooked and may be whole, cut-up, or mashed. Make half your plate fruits and vegetables.     Grains: All foods made from grains are part of the Grains Group. These include wheat, rice, oats, cornmeal, and barley such as bread, pasta, oatmeal, cereal, tortillas, and grits. Grains should be no more than a quarter of your plate. At least half of your grains should be whole grains.    Protein: This group includes meat, poultry, seafood, beans and peas, eggs, processed soy products (like tofu), nuts (including nut butters), and seeds. Make protein choices no more than a quarter of your plate. Meat and poultry choices should be lean or low fat.    Dairy: All fluid milk products and foods made from milk that contain calcium, like yogurt and cheese are part of the Dairy Group. (Foods that have little calcium, such as cream, butter, and cream  cheese, are not part of the group.) Most dairy choices should be low-fat or fat-free.    Oils: These are fats that are liquid at room temperature. They include canola, corn, olive, soybean, and sunflower oil. Foods that are mainly oil include mayonnaise, certain salad dressings, and soft margarines. You should have only 5 to 7 teaspoons of oils a day. You probably already get this much from the food you eat.  Use [employer] to Help Build Your Meals  The [employer] can help you plan and track your meals and activity. You can look up individual foods to see or compare their nutritional value. You can get guidelines for what and how much you should eat. You can compare your food choices. And you can assess personal physical activities and see ways you can improve. Go to www.[employer].gov/[employer]/.    3520-4496 [employer]. [address] [place] [address]. All rights reserved. This information is not intended as a substitute for professional medical care. Always follow your healthcare professional's instructions.           Patient Education   Understanding Advance Care Planning  Advance care planning is the process of deciding ones own future medical care. It helps ensure that if you cant speak for yourself, your wishes can still be carried out. The plan is a series of legal documents that note a persons wishes. The documents vary by state. Advance care planning may be done when a person has a serious illness that is expected to get worse. It may be done before major surgery. And it can help you and your family be prepared in case of a major illness or injury. Advance care planning helps with making decisions at these times.       A health care proxy is a person who acts as the voice of a patient when the patient cant speak for himself or herself. The name of this role varies by state. It may be called a Durable Medical Power of  or Durable Power of  for Healthcare.  It may be called an agent, surrogate, or advocate. Or it may be called a representative or decision maker. It is an official duty that is identified by a legal document. The document also varies by state.    Why Is Advance Care Planning Important?  If a person communicates their healthcare wishes:    They will be given medical care that matches their values and goals.    Their family members will not be forced to make decisions in a crisis with no guidance.  Creating a Plan  Making an advance care plan is often done in 3 steps:    Thinking about ones wishes. To create an advance care plan, you should think about what kind of medical treatment you would want if you lose the ability to communicate. Are there any situations in which you would refuse or stop treatment? Are there therapies you would want or not want? And whom do you want to make decisions for you? There are many places to learn more about how to plan for your care. Ask your doctor or  for resources.    Picking a health care proxy. This means choosing a trusted person to speak for you only when you cant speak for yourself. When you cannot make medical decisions, your proxy makes sure the instructions in your advance care plan are followed. A proxy does not make decisions based on his or her own opinions. They must put aside those opinions and values if needed, and carry out your wishes.    Filling out the legal documents. There are several kinds of legal documents for advance care planning. Each one tells health care providers your wishes. The documents may vary by state. They must be signed and may need to be witnessed or notarized. You can cancel or change them whenever you wish. Depending on your state, the documents may include a Healthcare Proxy form, Living Will, Durable Medical Power of , Advance Directive, or others.  The Familys Role  The best help a family can give is to support their loved ones wishes. Open and honest  communication is vital. Family should express any concerns they have about the patients choices while the patient can still make decisions.    9106-2240 [employer]. [address] [place] [address], [place] [address]. All rights reserved. This information is not intended as a substitute for professional medical care. Always follow your healthcare professional's instructions.         Also, [facility] offers a free, downloadable health care directive that allows you to share your treatment choices and personal preferences if you cannot communicate your wishes. It also allows you to appoint another person (called a health care agent) to make health care decisions if you are unable to do so. You can download an advance directive by going here: [email or web address]     Patient Education   Personalized Prevention Plan  You are due for the preventive services outlined below.  Your care team is available to assist you in scheduling these services.  If you have already completed any of these items, please share that information with your care team to update in your medical record.  Health Maintenance   Topic Date Due   ? ADVANCE CARE PLANNING  02/01/1963   ? ZOSTER VACCINES (1 of 2) 02/01/1993   ? MEDICARE ANNUAL WELLNESS VISIT  02/01/2008   ? PNEUMOCOCCAL POLYSACCHARIDE VACCINE AGE 65 AND OVER  02/01/2008   ? INFLUENZA VACCINE RULE BASED (1) 08/01/2019   ? FALL RISK ASSESSMENT  09/11/2020   ? TD 18+ HE  01/01/2023   ? PNEUMOCOCCAL CONJUGATE VACCINE FOR ADULTS (PCV13 OR PREVNAR)  Completed

## 2021-06-19 NOTE — LETTER
Letter by Urban Barker MD at      Author: Urban Barker MD Service: -- Author Type: --    Filed:  Encounter Date: 9/15/2019 Status: (Other)         Cali Gama  [address] [facility] [address]         September 15, 2019       Dear Mr. Gama,    Below are the results from your recent visit:    Lab Results   Component Value Date    CHOL 265 (H) 09/11/2019    CHOL 228 (H) 08/24/2018    CHOL 271 (H) 07/22/2014     Lab Results   Component Value Date    HDL 53 09/11/2019    HDL 41 08/24/2018    HDL 49 07/22/2014     Lab Results   Component Value Date    LDLCALC 182 (H) 09/11/2019    LDLCALC 192 (H) 07/22/2014     Lab Results   Component Value Date    TRIG 149 09/11/2019    TRIG 152 (H) 07/22/2014         Lab Results   Component Value Date    WBC 8.5 09/11/2019    HGB 17.5 09/11/2019    HCT 50.8 09/11/2019    MCV 95 09/11/2019     09/11/2019        Results for orders placed or performed in visit on 09/11/19   Comprehensive Metabolic Panel   Result Value Ref Range    Sodium 143 136 - 145 mmol/L    Potassium 4.1 3.5 - 5.0 mmol/L    Chloride 107 98 - 107 mmol/L    CO2 25 22 - 31 mmol/L    Anion Gap, Calculation 11 5 - 18 mmol/L    Glucose 98 70 - 125 mg/dL    BUN 13 8 - 28 mg/dL    Creatinine 1.29 0.70 - 1.30 mg/dL    GFR MDRD Af Amer >60 >60 mL/min/1.73m2    GFR MDRD Non Af Amer 54 (L) >60 mL/min/1.73m2    Bilirubin, Total 0.8 0.0 - 1.0 mg/dL    Calcium 9.8 8.5 - 10.5 mg/dL    Protein, Total 7.1 6.0 - 8.0 g/dL    Albumin 4.2 3.5 - 5.0 g/dL    Alkaline Phosphatase 86 45 - 120 U/L    AST 23 0 - 40 U/L    ALT 18 0 - 45 U/L          Your cholesterol results were elevated.  Ensure regular exercise, healthy diet, and weight loss modifications in order to further improve.  Weight goal < 190 pounds initially, < 185 pounds ideally.  We will plan to recheck your labs while fasting in the next 6-12 months to ensure desired improvement.      Your complete blood count results were normal.  No evidence for  anemia, etc.     No evidence for diabetes noted.     Your kidney function remains mildly reduced only.  Your liver function tests were normal.      Please call with questions or contact us using MyChart.    Sincerely,        Electronically signed by Urban Barker MD

## 2021-06-19 NOTE — LETTER
Letter by Urban Barker MD at      Author: Urabn Barker MD Service: -- Author Type: --    Filed:  Encounter Date: 6/13/2019 Status: (Other)         Cali Gama  [address]  [facility] [address]             June 13, 2019         Dear Mr. Gama,    Below are the results from your recent visit:    Resulted Orders   Basic Metabolic Panel   Result Value Ref Range    Sodium 143 136 - 145 mmol/L    Potassium 4.4 3.5 - 5.0 mmol/L    Chloride 108 (H) 98 - 107 mmol/L    CO2 26 22 - 31 mmol/L    Anion Gap, Calculation 9 5 - 18 mmol/L    Glucose 90 70 - 125 mg/dL    Calcium 9.7 8.5 - 10.5 mg/dL    BUN 19 8 - 28 mg/dL    Creatinine 1.26 0.70 - 1.30 mg/dL    GFR MDRD Af Amer >60 >60 mL/min/1.73m2    GFR MDRD Non Af Amer 56 (L) >60 mL/min/1.73m2    Narrative    Fasting Glucose reference range is 70-99 mg/dL per  American Diabetes Association (ADA) guidelines.   HM2(CBC w/o Differential)   Result Value Ref Range    WBC 8.9 4.0 - 11.0 thou/uL    RBC 5.41 4.40 - 6.20 mill/uL    Hemoglobin 17.3 14.0 - 18.0 g/dL    Hematocrit 52.5 40.0 - 54.0 %    MCV 97 80 - 100 fL    MCH 32.0 27.0 - 34.0 pg    MCHC 33.0 32.0 - 36.0 g/dL    RDW 11.8 11.0 - 14.5 %    Platelets 248 140 - 440 thou/uL    MPV 7.9 7.0 - 10.0 fL       Your kidney function is mildly reduced.  It appears stable.  Ensure adequate hydration.  We will continue to follow.    Your complete blood count results were normal.  No evidence for anemia, etc.     Please call with questions or contact us using [employer].    Sincerely,        Electronically signed by Urban Barker MD

## 2021-06-19 NOTE — PROGRESS NOTES
Assessment/Plan:    1. Posterior chest pain  Posterior left chest pain initially more subscapular and then radiates around left side of chest.  Decreased appetite associated with this.  Cardiac workup in ER August 16 negative for acute coronary syndrome.  Check troponin I level otherwise lipase and CBC with differential currently to rule out pancreatitis with recent mild leukocytosis with white count 14.  Comprehensive metabolic panel pending.  Did provide refill on hydrocodone acetaminophen 5/325 use 1-2 tablets every 6 hours as needed for pain.  Avoid with Advil due to not eating and ensure no evidence for gastritis, etc.  Do not use with Tylenol if requiring ongoing hydrocodone acetaminophen use.  Will reassess next week in office per  - Lipase  - HM1(CBC and Differential)  - Comprehensive Metabolic Panel  - Troponin I  - HYDROcodone-acetaminophen 5-325 mg per tablet; Take 1-2 tablets by mouth every 6 (six) hours as needed for pain.  Dispense: 30 tablet; Refill: 0  - HM1 (CBC with Diff)    2. Anorexia  As above, check lipase.  Comprehensive metabolic panel pending.  Anticipate slow improvement in appetite.  [place]man diet or clear liquid as tolerated.  Avoid fatty foods.  Ensure adequate hydration in the meantime.  Reassess next week.  - Lipase    3. Thyroid nodule  Thyroid nodule incidental on recent CT angiogram.  2 cm.  Check TSH, free T4 and total T3.  Thyroid ultrasound to be performed.  Future referral for endocrinologist evaluation and consider fine-needle aspiration as indicated.  - Thyroid Stimulating Hormone (TSH)  - T4, Free  - T3, Total  - US Thyroid; Future  - Ambulatory referral to Endocrinology    40 minutes total time with patient, > 50% with counseling and coordination of cares.         Subjective:    Cali EASLEY McKiarra is seen today for ER follow-up.  Went to ER Wednesday, August 15 due to concerns with left posterior shoulder pain subscapular then radiating around side to left upper quadrant perhaps.   No associated rash.  Initially seen through urgent care who referred to ER.  No bloating.  Decreased appetite.  No constipation or diarrhea.  Prior cholecystectomy noted.  No recent travel described.  Pain 9 out of 10 and dull.  Not sharp or burning.  Pain improved a 5 out of 10 if using Vicodin tablet and pain returns.  Noted incidental thyroid nodule on CT angiogram which was negative for pneumonia, pulmonary emboli etc.  CT abdomen and pelvis without obvious etiology as well with pancreas described as normal.  Diverticulosis without diverticulitis.  Status post cholecystectomy noted.  Did have a TURP procedure in 2018 for BPH management while in [place] otherwise no complications from this.  History of IBS and GERD described.  Often using Advil up to 9 tablets yesterday with 2 tablets of Valium and 2 Vicodin tablets as well.  Comprehensive review of systems as above otherwise all negative.    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 Navarro... [facility]   Lives in [place] x 6 months of the year   Hobbies: writing and weight lifting     Past Surgical History:   Procedure Laterality Date     CHOLECYSTECTOMY       JOINT REPLACEMENT          Family History   Problem Relation Age of Onset     Cancer Father      bladder     Aneurysm Father      Dementia Mother      No Medical Problems Sister      No Medical Problems Daughter      No Medical Problems Maternal  Grandmother      No Medical Problems Maternal Grandfather      No Medical Problems Paternal Grandmother      No Medical Problems Paternal Grandfather      No Medical Problems Maternal Aunt      No Medical Problems Paternal Aunt      BRCA 1/2 Neg Hx      Breast cancer Neg Hx      Colon cancer Neg Hx      Endometrial cancer Neg Hx      Ovarian cancer Neg Hx         Past Medical History:   Diagnosis Date     BPH with urinary obstruction      Chronic kidney disease         Social History   Substance Use Topics     Smoking status: Former Smoker     Smokeless tobacco: Never Used     Alcohol use 1.2 oz/week     2 Standard drinks or equivalent per week        Current Outpatient Prescriptions   Medication Sig Dispense Refill     acetaminophen (TYLENOL) 500 MG tablet Take 500 mg by mouth every 6 (six) hours as needed for pain.       diazePAM (VALIUM) 5 MG tablet Take 1 tablet (5 mg total) by mouth 2 (two) times a day. 4 tablet 0     diphenhydrAMINE (BENADRYL) 25 mg capsule Take 25-50 mg by mouth at bedtime as needed for itching.       famotidine (FOR PEPCID) 10 MG tablet Take 10 mg by mouth 2 (two) times a day.       HYDROcodone-acetaminophen 5-325 mg per tablet Take 1-2 tablets by mouth every 6 (six) hours as needed for pain. 30 tablet 0     ibuprofen (ADVIL,MOTRIN) 200 MG tablet Take 400 mg by mouth every 6 (six) hours as needed for pain.       No current facility-administered medications for this visit.           Objective:    Vitals:    08/17/18 1051   BP: 120/90   Pulse: 87   SpO2: 97%   Weight: 199 lb (90.3 kg)      Body mass index is 29.39 kg/(m^2).    Alert.  No apparent distress at rest.  Quiet affect.  Nontoxic.  HEENT exam appears to have moist because membranes.  Neck supple.  No significant thyroid enlargement.  Chest clear.  Cardiac exam regular.  Abdomen bowel sounds present without high-pitched concerns.  Not under active.  Extremities warm and dry.  Good skin turgor.  No rash.  No reproducible chest wall  tenderness.  No diaphoresis.  No psychomotor agitation.        EKG 8/15/18:   Normal sinus rhythm   Minimal voltage criteria for LVH, may be normal variant   Borderline ECG   No previous ECGs available   Confirmed by MARLIN FINN MD LOC: ([id number]) on 8/16/2018 10:44:53 AM                 CTA CHEST PE RUN, CT ABDOMEN PELVIS WO ORAL WO IV CONTRAST  8/15/2018 10:29 PM     INDICATION: Left scapular pain.  TECHNIQUE: Helical acquisition through the chest was performed during the arterial phase of contrast enhancement using IV contrast. 2D and 3D reconstructions were performed by the CT technologist. Dose reduction techniques were used.   IV CONTRAST: Iohexol (Omni) 100 mL  COMPARISON: None.     FINDINGS:  ANGIOGRAM CHEST: No pulmonary embolism.     RV/LV RATIO: N/A     LUNGS AND PLEURA: Dependent atelectasis.     MEDIASTINUM: No lymphadenopathy.      ABDOMEN: There are multiple small bilateral nonobstructing renal stones present with the largest stone measuring 3 mm. No ureteric stone or hydronephrosis. Simple cyst within the right kidney. Cholecystectomy. Liver, spleen, pancreas, adrenal glands   negative.     PELVIS: Multiple colonic diverticula without CT evidence for diverticulitis.     MUSCULOSKELETAL: Mild lower lumbar facet arthropathy. The scapula appears normal.     IMPRESSION:   1.  No pulmonary embolism.     2.  2 cm nodule right thyroid lobe.     3.  Cholecystectomy.     4.  Colonic diverticula without CT evidence for diverticulitis.     5.  Multiple bilateral nonobstructing renal stones.     6.  Vascular calcification.          This note has been dictated using voice recognition software and as a result may contain minor grammatical errors and unintended word substitutions.

## 2021-06-20 NOTE — LETTER
Letter by Sandy Wilcox CNP at      Author: Sandy Wilcox CNP Service: -- Author Type: --    Filed:  Encounter Date: 9/10/2020 Status: (Other)         Cali Gama  [address] [address]             September 10, 2020         Dear Mr. Gama,    Below are the results from your recent visit:    Resulted Orders   Hemoglobin   Result Value Ref Range    Hemoglobin 16.7 14.0 - 18.0 g/dL       Your hemoglobin is normal.     Please call with questions or contact us using [employer].    Sincerely,        Electronically signed by Sandy Wilcox CNP

## 2021-06-20 NOTE — PROGRESS NOTES
"Assessment/Plan:    1. Posterior chest pain  Posterior chest pain, resolved however describes symptoms as \"waiting to come back\".  Will monitor at this time.  Repeat CBC.  Check CK level.  Additional pulmonary and cardiac workup has been negative.  - HM2(CBC w/o Differential)  - CK Total    2. Thyroid nodule  Patient will schedule fine-needle aspiration of thyroid nodule.  Lab testing has been normal including TSH, total T3 and free T4.    3. Hyponatremia  Mild hyponatremia prior office visit August 17, 2018 will repeat basic metabolic panel.  - Basic Metabolic Panel    4. Polycythemia  Mildly elevated hemoglobin 19.3.  Non-smoker.  Repeat CBC.  - HM2(CBC w/o Differential)    5. Renal insufficiency  Noted mild renal insufficiency question prerenal azotemia with creatinine 1.26 GFR 56.  - Basic Metabolic Panel    6. Hyperlipidemia LDL goal <100  Has had history of hyperlipidemia and has been on medication in the past however has not tolerated well due to side effects of muscle achiness etc.  Will repeat nonfasting cholesterol labs as noted for LDL goal remaining less than 190 initially with dietary and exercise modifications reviewed for LDL goal less than 100 ideally.  - LDL Cholesterol, Direct  - HDL Cholesterol  - Cholesterol, Total    The following high BMI interventions were performed this visit: encouragement to exercise, weight monitoring, weight loss from baseline weight and lifestyle education regarding diet.  Ensure ongoing efforts to achieve weight goal < 190 pounds initially, < 185 pounds ideally.         Subjective:    Cali Gama is seen today for follow-up evaluation.  Left posterior chest pain resolved the following day after office visit August 17 at 2018 and ER visit August 16, 2018.  Reviewed office note from August 17, 2018 for additional details of HPI otherwise lab evaluation with normal thyroid testing.  Troponin I negative.  Normal lipase.  Mild hyponatremia sodium 133 and creatinine 1.26 " with GFR 56.  Bilirubin at 1.4.  Hemoglobin mildly elevated 19.3 with MCV 89.  Did discuss with patient request for fine needle aspiration thyroid nodule however patient waiting to speak with me today prior to getting this scheduled.  Has had cholesterol elevation in the past for which she has been treated however never tolerated medication well and wants to remain off medication ideally.  Still has some fatigue however appetite has returned.  No fevers or chills.  No rash.  No cough.  No shortness of breath.  No hemoptysis.  Comprehensive review of systems as above otherwise all negative.    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 Navarro... [facility]   Lives in [place] x 6 months of the year   Hobbies: writing and weight lifting    Past Surgical History:   Procedure Laterality Date     CHOLECYSTECTOMY       JOINT REPLACEMENT          Family History   Problem Relation Age of Onset     Cancer Father      bladder     Aneurysm Father      Dementia Mother      No Medical Problems Sister      No Medical Problems Daughter      No Medical Problems Maternal Grandmother      No Medical Problems Maternal Grandfather      No Medical Problems Paternal Grandmother      No Medical Problems Paternal Grandfather      No Medical Problems Maternal Aunt      No Medical Problems Paternal Aunt      BRCA 1/2 Neg Hx      Breast cancer Neg Hx      Colon cancer Neg Hx      Endometrial  cancer Neg Hx      Ovarian cancer Neg Hx         Past Medical History:   Diagnosis Date     BPH with urinary obstruction      Chronic kidney disease         Social History   Substance Use Topics     Smoking status: Former Smoker     Smokeless tobacco: Never Used     Alcohol use 1.2 oz/week     2 Standard drinks or equivalent per week        Current Outpatient Prescriptions   Medication Sig Dispense Refill     diphenhydrAMINE (BENADRYL) 25 mg capsule Take 25-50 mg by mouth at bedtime as needed for itching.       famotidine (FOR PEPCID) 10 MG tablet Take 10 mg by mouth 2 (two) times a day.       ibuprofen (ADVIL,MOTRIN) 200 MG tablet Take 400 mg by mouth every 6 (six) hours as needed for pain.       acetaminophen (TYLENOL) 500 MG tablet Take 500 mg by mouth every 6 (six) hours as needed for pain.       diazePAM (VALIUM) 5 MG tablet Take 1 tablet (5 mg total) by mouth 2 (two) times a day. 4 tablet 0     HYDROcodone-acetaminophen 5-325 mg per tablet Take 1-2 tablets by mouth every 6 (six) hours as needed for pain. 30 tablet 0     No current facility-administered medications for this visit.           Objective:    Vitals:    08/24/18 1313   BP: 130/70   Pulse: 73   SpO2: 98%   Weight: 200 lb (90.7 kg)      Body mass index is 29.53 kg/(m^2).    Alert.  No apparent distress.  No obvious thyroid nodularity on exam.  No goiter.  No cervical lymphadenopathy.  Chest clear.  Cardiac exam regular.  Extremities warm and dry.        US THYROID  8/21/2018 8:52 AM     INDICATION: Thyroid nodule seen on CT. Further characterization.  TECHNIQUE: Routine.  COMPARISON: CT 5/18/2018     FINDINGS:  RIGHT thyroid lobe measures 4.2 x 2.0 x 2.4 cm. Solitary nodule measures 1.9 x 1.8 cm mid gland.  Nodule: Mid gland nodule 1.9 cm.   Composition: Solid or almost completely solid 2   Echogenicity: Hyperechoic or isoechoic 1   Shape: Wider-than-tall 0   Margin: Smooth 0   Echogenic Foci: Punctate echoic foci 3   Point Total: 4-6 points. TI-RADS  4. FNA if >=1.5cm. Follow if >=1 cm.      LEFT thyroid lobe measures 3.5 x 1.4 x 1.7 cm. Single tiny nodule measures 8 x 6 mm should be of little significance.     Isthmus measures 3 mm. No additional findings.     No cervical lymphadenopathy.     IMPRESSION:   CONCLUSION:  1.  Solitary nodule mid right thyroid gland, TI-RADS 4, 1.9 cm. Consider fine-needle aspiration biopsy.     2.  No other significant findings.     TI-RADS 1. No FNA.  TI-RADS 2. No FNA.  TI-RADS 3. FNA if >=2.5 cm. Follow up ultrasound in 1 year >=1.5 cm.  TI-RADS 4. FNA if >=1.5cm. Follow up ultrasound in 1 year >=1 cm.  TI-RADS 5. FNA if >=1 cm. Follow up ultrasound in 1 year >=0.5 cm.        CTA CHEST PE RUN, CT ABDOMEN PELVIS WO ORAL WO IV CONTRAST  8/15/2018 10:29 PM     INDICATION: Left scapular pain.  TECHNIQUE: Helical acquisition through the chest was performed during the arterial phase of contrast enhancement using IV contrast. 2D and 3D reconstructions were performed by the CT technologist. Dose reduction techniques were used.   IV CONTRAST: Iohexol (Omni) 100 mL  COMPARISON: None.     FINDINGS:  ANGIOGRAM CHEST: No pulmonary embolism.     RV/LV RATIO: N/A     LUNGS AND PLEURA: Dependent atelectasis.     MEDIASTINUM: No lymphadenopathy.      ABDOMEN: There are multiple small bilateral nonobstructing renal stones present with the largest stone measuring 3 mm. No ureteric stone or hydronephrosis. Simple cyst within the right kidney. Cholecystectomy. Liver, spleen, pancreas, adrenal glands   negative.     PELVIS: Multiple colonic diverticula without CT evidence for diverticulitis.     MUSCULOSKELETAL: Mild lower lumbar facet arthropathy. The scapula appears normal.     IMPRESSION:   1.  No pulmonary embolism.     2.  2 cm nodule right thyroid lobe.     3.  Cholecystectomy.     4.  Colonic diverticula without CT evidence for diverticulitis.     5.  Multiple bilateral nonobstructing renal stones.     6.  Vascular  calcification.          This note has been dictated using voice recognition software and as a result may contain minor grammatical errors and unintended word substitutions.

## 2021-06-21 NOTE — LETTER
Letter by Myhre, David J, RN at      Author: Myhre, David J, RN Service: -- Author Type: --    Filed:  Encounter Date: 11/25/2020 Status: (Other)       CARE COORDINATION  [facility]    December 8, 2020    Cali Gama  [address]  [place] [facility] [address]      Dear Cali,    I am a clinic community health worker who works with Urban Barker MD at [phone] I wanted to introduce myself and provide you with my contact information for you to be able to call me with any questions or concerns. Below is a description of clinic care coordination and how I can further assist you.      The clinic care coordination team is made up of a registered nurse,  and community health worker who understand the health care system. The goal of clinic care coordination is to help you manage your health and improve access to the health care system in the most efficient manner. The team can assist you in meeting your health care goals by providing education, coordinating services, strengthening the communication among your providers and supporting you with any resource needs.    Please feel free to contact me at [phone] with any questions or concerns. We are focused on providing you with the highest-quality healthcare experience possible and that all starts with you.     Sincerely,     Britney Rodney, Community Health Worker

## 2021-06-22 ENCOUNTER — HOSPITAL ENCOUNTER (OUTPATIENT)
Dept: CT IMAGING | Facility: CLINIC | Age: 76
Discharge: HOME | End: 2021-06-22
Attending: FAMILY MEDICINE
Payer: MEDICARE

## 2021-06-22 DIAGNOSIS — R61 NIGHT SWEATS: ICD-10-CM

## 2021-06-22 DIAGNOSIS — Z87.898 HX OF ABDOMINAL ABSCESS: ICD-10-CM

## 2021-06-25 NOTE — TELEPHONE ENCOUNTER
Patient calling to report back on he is doing.   He stopped taking Cefdinir on Thursday night since he had diarrhea immediately after taking that medication. The diarrhea lasted until Saturday evening and some of the stool was red.     Cali @ [phone]

## 2021-06-25 NOTE — PROGRESS NOTES
[facility] INFECTIOUS DISEASE CLINIC [facility]    Date: 6/3/2021  Patient Name: Cali Gama   YOB: 1945  MRN: [id number]      ASSESSMENT:  76-year-old man without significant past medical history who is referred to ID clinic for night sweats.    1. Intestinal perforation following ERCP.  Treated in [place] with IV antibiotics, then discharged on oral antibiotics x3 weeks (possibly Augmentin, cannot confirm).  Known soft tissue density around the right kidney thought to be abscess or phlegmon.  Serial imaging shows this is decreasing in size.  2. Choledocholithiasis.  Underwent biliary stone removal in March 2021 in [place].  Complicated by intestinal perforation.  Had a second ERCP with stent placement.  3. Night sweats.  Presumably related to his recent infection, possibly from persistent abscess/phlegmon.  Interestingly, labs are relatively benign with normal white count and CRP (previous labs reviewed).  CT documents improvement in appearance of the lesion.  No other focal symptoms today.  4. Allergy to ciprofloxacin with tendinitis    PLAN:  -Empiric cefdinir and Flagyl x2 weeks for possible abdominal infection related to recent perforation  -Labs: CBC with differential, CMP, blood cultures x2, Coccidioides antibody, lipase, CRP, urine histo antigen  -Chest x-ray  -Agree with referral to GI  -If not improving with antibiotics, will need repeat CT abdomen with contrast  -We will try to obtain hospital records from [facility] in [place] for further details on his perforation and treatment of infection.    Return to clinic in 3 weeks.    Heath Hernandez MD  [facility]   Clinic phone: [phone]   Clinic fax: [phone]     ______________________________________________________________________    HISTORY OF PRESENT ILLNESS:   Cali Gama is a 76 y.o. man who is referred for evaluation of night sweats.  The patient has been having regular night sweats for the past  3 months.  He has a history of cholecystectomy in 2003.  Subsequently, he had a biliary stent placed for some right upper quadrant discomfort that he was having.  He never followed up for this, and presumed that the stent was permanent.  Around December 2020 he began having right upper quadrant pain especially with bending over.  He followed up with a GI doctor in [place] who recommended ERCP for stent removal.  He was found to have a large bile duct stone.  Following the ERCP, he had a perforation requiring hospitalization.  He was hospitalized from 3/19-3/24 in [place].  He tells me that the next day he had a second ERCP with placement of a metal stent.  Per records, the indication was for a small retroperitoneal air leak.  He was treated with IV antibiotics, then discharged on an oral antibiotic for 3 weeks.  (He describes this as 1 pill twice a day, large white pill, he does not remember the name).  CT scan of the abdomen on 3/30 shows a right perirenal fluid collection 3 x 2 x 1 cm.  The CT was repeated on 4/12 which showed decreasing fluid collection of 0.8 x 2.5 cm.  A third scan was done on 5/10 which describes small enhancing lesion anterior to the right kidney that is decreasing in size.    The patient tells me that he has been having night sweats since March.  He continues to feel fullness in his right upper quadrant when he bends over.  He is having some constipation, otherwise has no acute complaints.  He denies any dysuria.  He has not recorded any fevers at home.  He does not have any recent Covid exposures.  He was vaccinated back in February.  He denies any cough or worsening shortness of breath.  He lives in [place], but spends the sandoval in [place].  He grew up in [place].  No known TB exposures.      Interval History:  Not applicable      Review of Systems:  Ten systems reviewed and negative except for what is noted in the HPI         Past Medical History:  Past Medical History:    Diagnosis Date     BPH with urinary obstruction      Chronic kidney disease      GERD (gastroesophageal reflux disease) 7/29/2015     Irritable bowel syndrome     Created by Conversion      Osteoarthritis Of The Knee     Created by Conversion  Replacement Utility updated for latest IMO load     Stage 2 chronic kidney disease 7/29/2015     Thyroid nodule 8/15/2018    right side, 2 cm discovered on 8/15/18       Past Surgical History:  Past Surgical History:   Procedure Laterality Date     CHOLECYSTECTOMY       JOINT REPLACEMENT       DE TRANSURETHRAL ELEC-SURG PROSTATECTOM N/A 9/15/2020    Procedure: CYSTOSCOPY, BIPOLAR TRANSURETHRAL RESECTION OF PROSTATE;  Surgeon: Mario Reynolds MD;  Location: [facility] [place];  Service: Urology       Allergies:  Allergies   Allergen Reactions     Ciprofloxacin Other (See Comments)     tendonitis     Morphine      Patient states that he cannot urinate after taking morphine.     Vitamin A Unknown     Paba Derivatives       Medications:    Current Outpatient Medications:      famotidine (FOR PEPCID) 10 MG tablet, Take 10 mg by mouth every evening. , Disp: , Rfl:      triamcinolone (KENALOG) 0.1 % cream, APPLY TOPICALLY TO AFFECTED AREA BID PRN FOR RASH, Disp: , Rfl:     Social History:  Social History     Socioeconomic History     Marital status:      Spouse name: Not on file     Number of children: Not on file     Years of education: Not on file     Highest education level: Not on file   Occupational History     Not on file   Social Needs     Financial resource strain: Not very hard     Food insecurity     Worry: Never true     Inability: Never true     Transportation needs     Medical: No     Non-medical: No   Tobacco Use     Smoking status: Former Smoker     Smokeless tobacco: Never Used   Substance and Sexual Activity     Alcohol use: Yes     Alcohol/week: 2.0 standard drinks     Types: 2 Standard drinks or equivalent per week     Drug use: No     Sexual  activity: Not on file   Lifestyle     Physical activity     Days per week: 4 days     Minutes per session: 30 min     Stress: Only a little   Relationships     Social connections     Talks on phone: Not on file     Gets together: Not on file     Attends Anabaptist service: Not on file     Active member of club or organization: Not on file     Attends meetings of clubs or organizations: Not on file     Relationship status: Not on file     Intimate partner violence     Fear of current or ex partner: Not on file     Emotionally abused: Not on file     Physically abused: Not on file     Forced sexual activity: Not on file   Other Topics Concern     Not on file   Social History Narrative     Not on file        Family History:  Family History   Problem Relation Age of Onset     Cancer Father         bladder     Aneurysm Father      Dementia Mother      No Medical Problems Sister      No Medical Problems Daughter      No Medical Problems Maternal Grandmother      No Medical Problems Maternal Grandfather      No Medical Problems Paternal Grandmother      No Medical Problems Paternal Grandfather      No Medical Problems Maternal Aunt      No Medical Problems Paternal Aunt      BRCA 1/2 Neg Hx      Breast cancer Neg Hx      Colon cancer Neg Hx      Endometrial cancer Neg Hx      Ovarian cancer Neg Hx            PHYSICAL EXAM:    Vitals:    06/03/21 0955   BP: (!) 140/92   Pulse: 72   Temp: 98.1  F (36.7  C)       GENERAL:  well-developed, well-nourished, in no acute distress.   HENT:  Head is normocephalic, atraumatic. Oropharynx is moist without exudates or ulcers.  EYES:  Eyes have anicteric sclerae without conjunctival injection or stigmata of endocarditis.    NECK:  Supple. No cervical lymphadenopathy  LUNGS:  Clear to auscultation.  CARDIOVASCULAR:  Regular rate and rhythm with no murmurs, gallops or rubs.  ABDOMEN:  Normal bowel sounds, soft, nontender. No hepatosplenomegaly.  MUSCULOSKELETAL: Extremities warm and  without edema. No joint swelling.  SKIN:  No acute rashes. No stigmata of endocarditis.  NEUROLOGIC: Grossly nonfocal. Normal gait and station        Pertinent labs:            Lab Results   Component Value Date    CRP 0.5 05/25/2021         Lab Results   Component Value Date    ALT 22 05/25/2021    AST 20 05/25/2021    ALKPHOS 121 (H) 05/25/2021    BILITOT 0.6 05/25/2021         MICROBIOLOGY DATA:  None     RADIOLOGY:  Outside reports from CT scans of the abdomen were reviewed and summarized in the note above    Attestation:  Total time preparing to see this patient, face-to-face time, and coordinating care time on the same calendar date: 55 minutes.  Face-face time: 34 minutes.  Over 50% of face-to-face time was spent in counseling/coordination of care.

## 2021-06-25 NOTE — TELEPHONE ENCOUNTER
I called patient who reports bloody stools since Thursday. He also is having worse night sweats. Diarrhea is improved. No lightheadedness today.  I don't think his symptoms are from the antibiotics as these were just started on Thursday as well. I recommend make an urgent appt with his PCP, and if he can't be seen soon, then he should go to the ER.

## 2021-06-25 NOTE — TELEPHONE ENCOUNTER
Calling [facility] again to request for additional notes, such as discharge summary, labs, cultures of OP notes.  Ph: [phone]    Reji, states their system is down and advise to call back tomorrow.

## 2021-06-25 NOTE — TELEPHONE ENCOUNTER
Patient is calling back and wanting to get in to see Dr. Barker regarding this these message if need be.  Patient doesn't see the need to go into the ER. Patient is wanting a different medication for the night sweats because he thinks that the Cefdinir is not the medication for him. Please advise and call patient back.     Patient has a dentist appt at 9 so call anytime after that.     Thank you

## 2021-06-25 NOTE — PROGRESS NOTES
Assessment/Plan:    1. Choledocholithiasis  History of choledocholithiasis.  Patient needs to see gastroenterology regarding eventual metal stent removal after 6 months with placement around March 20, 2021 apparently.  Would like to have removed prior to returning to [facility] this fall.  Reviewed hospital records including prior ERCP biliary duct report from March 19, 2021 as well as subsequent CT abdomen pelvis May 10, 2021.  - Ambulatory referral to Gastroenterology  - Comprehensive Metabolic Panel    2. Night sweats  Night sweats.  CRP, ESR, CBC with differential, comprehensive metabolic panel and TSH obtained today.  Referred to infectious disease.  Did fine last evening without night sweats and will hopefully see continued progress as noted left small soft tissue density anterior to right kidney shows continued resolution consistent with resolving abscess or phlegmon.  - C-Reactive Protein  - Erythrocyte Sedimentation Rate  - HM1(CBC and Differential)  - Comprehensive Metabolic Panel  - TSH  - Ambulatory referral to Infectious Disease    3. Stage 3a chronic kidney disease  CKD stage IIIa.  Ensure stable renal function.  - Comprehensive Metabolic Panel    4. Resolving abscess  As above, noted left small soft tissue density anterior to right kidney shows continued resolution consistent with resolving abscess or phlegmon.      40 minutes total time with patient, > 50% with chart review, counseling and coordination of cares, and documentation.       Subjective:    Cali Gama is seen today for follow-up assessment.  Patient returning from [facility].  Was hospitalized March 19, 2021 through March 24, 2021 with surgery for large bile duct stone described.  ERCP with apparent intestinal perforation complication.  Had initial plastic stent replaced with metal stent that could be removed in 6 months.  Would like to have this done prior to returning to [place].  Was followed following procedure for ongoing  concerns including night sweats and noted small soft tissue density anterior to right kidney consistent with likely resolving abscess or phlegmon.  Prior 3.7 cm x 1.2 cm size decreasing to 2.5 cm x 0.8 cm size May 10, 2021 on CT abdomen pelvis.  Last night did not have night sweats however has every evening.  No problems during the day.  No heart racing.  No known fevers or chills.  Bilateral nephrolithiasis.  Is scheduled to follow-up with urologist tomorrow did have prior TURP procedure likely x2 described.  Constipation management historically utilizing Metamucil, prune juice and tablespoon of oil.  Comprehensive review of systems as above otherwise all negative.    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 Navarro... [facility]   Lives in [place] x 6 months of the year   Hobbies: writing and weight lifting    Past Surgical History:   Procedure Laterality Date     CHOLECYSTECTOMY       JOINT REPLACEMENT       CO TRANSURETHRAL ELEC-SURG PROSTATECTOM N/A 9/15/2020    Procedure: CYSTOSCOPY, BIPOLAR TRANSURETHRAL RESECTION OF PROSTATE;  Surgeon: Mario Reynolds MD;  Location: [facility] OR;  Service: Urology        Family History   Problem Relation Age of Onset     Cancer Father         bladder     Aneurysm Father      Dementia Mother      No Medical Problems Sister      No Medical Problems Daughter      No  Medical Problems Maternal Grandmother      No Medical Problems Maternal Grandfather      No Medical Problems Paternal Grandmother      No Medical Problems Paternal Grandfather      No Medical Problems Maternal Aunt      No Medical Problems Paternal Aunt      BRCA 1/2 Neg Hx      Breast cancer Neg Hx      Colon cancer Neg Hx      Endometrial cancer Neg Hx      Ovarian cancer Neg Hx         Past Medical History:   Diagnosis Date     BPH with urinary obstruction      Chronic kidney disease      GERD (gastroesophageal reflux disease) 7/29/2015     Irritable bowel syndrome     Created by Conversion      Osteoarthritis Of The Knee     Created by Conversion  Replacement Utility updated for latest IMO load     Stage 2 chronic kidney disease 7/29/2015     Thyroid nodule 8/15/2018    right side, 2 cm discovered on 8/15/18        Social History     Tobacco Use     Smoking status: Former Smoker     Smokeless tobacco: Never Used   Substance Use Topics     Alcohol use: Yes     Alcohol/week: 2.0 standard drinks     Types: 2 Standard drinks or equivalent per week     Drug use: No        Current Outpatient Medications   Medication Sig Dispense Refill     famotidine (FOR PEPCID) 10 MG tablet Take 10 mg by mouth every evening.        No current facility-administered medications for this visit.           Objective:    Vitals:    05/25/21 1243   BP: 140/80   Pulse: 87   SpO2: 95%   Weight: 189 lb (85.7 kg)      Body mass index is 27.91 kg/m .    Alert.  No apparent distress.  Chest clear.  Cardiac exam regular.  Extremities warm dry.  Abdomen with positive bowel sounds without guarding or rebound.  Neurologic exam nonfocal.      This note has been dictated using voice recognition software and as a result may contain minor grammatical errors and unintended word substitutions.

## 2021-06-25 NOTE — TELEPHONE ENCOUNTER
Symptoms reviewed with patient. He will take metronidazole (white pill) over the weekend. If feeling better, then he will try cefdinir again on Monday. He will report back with how he's doing.    Alternatively could do TMP/SMX and metronidazole OR augmentin again.  H/o tendon issues with ciprofloxacin.

## 2021-06-25 NOTE — TELEPHONE ENCOUNTER
I called the pt, he has not taken either of the medications below, since Thursday. The pt report numerous watery stools until Saturday evening, which included red blood. The pt also reports nausea, and abd discomfort, which is described as crowded and feeling like the abd was going to burst. The pt reports Thursday, Friday, and Saturday increased night sweats and chills. Pt denies a fever or any other sx. The pt states that Sunday he has one formed but soft stool and today no BM's as of yet. Please advise. Thanks

## 2021-06-25 NOTE — TELEPHONE ENCOUNTER
Dr Hernandez      Please call pt back to discuss medication side affects that he is experiencing @ [phone]

## 2021-06-25 NOTE — TELEPHONE ENCOUNTER
ID Team    Is DX appropriate for consultation with us?     Authorizing: Urban Barker MD in [facility] MEDICINE/OB    Referral: [id number] (Not Needed)           Priority: Routine   Diagnosis: Night sweats [R61]

## 2021-06-26 NOTE — TELEPHONE ENCOUNTER
Called HIM again, now x4. Line is always busy and unable to lease VM. Will fax request again and for HIM team to call regarding record request.

## 2021-06-26 NOTE — PROGRESS NOTES
Assessment/Plan:    1. Night sweats  Discussed concerns regarding night sweats ongoing.  Question of intra-abdominal abscesses previously outlined from office visit May 25, 2021 with noted small soft tissue density anterior to right kidney showing continued resolution consistent with resolving abscess or phlegmon on most recent CT May 10, 2021 while in [place].  Patient had seen Dr. Hernandez infectious disease and was started on cefdinir 300 mg twice daily x2 weeks as well as metronidazole 500 mg 3 times daily x2 weeks.  Patient will reattempt starting cefdinir 300 mg once today then twice daily.  If continuing to do well on Friday would add metronidazole at that point and ensure tolerance as well.  Anticipate repeat CT scan in 2 weeks on June 22 with review of results by infectious disease specialist June 24, 2021 as scheduled.  - CT Abdomen Pelvis With Oral With IV Contrast; Future    2. Stage 3a chronic kidney disease  CKD stage III a history and will continue to ensure adequate hydration.    3. Hx of abdominal abscess  As above, order was placed for CT abdomen pelvis to be completed in 2 weeks prior to follow-up office visit with infectious disease specialist Dr. Hernandez June 24, 2021.  Will see gastroenterologist July 29, 2021 as well.  - CT Abdomen Pelvis With Oral With IV Contrast; Future    30 minutes total time with patient, > 50% with chart review, counseling and coordination of cares, and documentation.         Subjective:    Cali Gama is seen today for concerns with ongoing night sweats.  Did review office note May 25, 2021.  Choledocholithiasis concerns historically and was to have subsequent metal stent removal approximately 6 months after initial placement March 20, 2021.  Had ERCP previously March 19, 2021 and did have question of procedural complication with noted abscess or phlegmon measuring 3.7 cm x 1.2 cm decreasing to 2.5 cm x 0.8 cm 8/10/2021 on follow-up CT abdomen pelvis.  Was referred to  Dr. Hernandez infectious disease and was told to use cefdinir 300 mg twice daily x14 days metronidazole 500 mg 3 times daily x14 days.  Unfortunately had single dose of cefdinir initially with bowel movement an hour later than metronidazole later that day followed by second dose of cefdinir.  Due to not feeling well patient discontinued all antibiotics and it took additional 2 days to start to feel back to his baseline.  Does fine during the day with normal appetite in general without unintentional weight loss and is exercising and working out however at night has describe night chills or night sweats.  Patient does have underlying CKD stage IIIa etc.  Comprehensive review of systems as above otherwise all negative.      ASSESSMENT:  76-year-old man without significant past medical history who is referred to ID clinic for night sweats.     1. Intestinal perforation following ERCP.  Treated in [place] with IV antibiotics, then discharged on oral antibiotics x3 weeks (possibly Augmentin, cannot confirm).  Known soft tissue density around the right kidney thought to be abscess or phlegmon.  Serial imaging shows this is decreasing in size.  2. Choledocholithiasis.  Underwent biliary stone removal in March 2021 in [place].  Complicated by intestinal perforation.  Had a second ERCP with stent placement.  3. Night sweats.  Presumably related to his recent infection, possibly from persistent abscess/phlegmon.  Interestingly, labs are relatively benign with normal white count and CRP (previous labs reviewed).  CT documents improvement in appearance of the lesion.  No other focal symptoms today.  4. Allergy to ciprofloxacin with tendinitis     PLAN:  -Empiric cefdinir and Flagyl x2 weeks for possible abdominal infection related to recent perforation  -Labs: CBC with differential, CMP, blood cultures x2, Coccidioides antibody, lipase, CRP, urine histo antigen  -Chest x-ray  -Agree with referral to GI  -If not improving with  antibiotics, will need repeat CT abdomen with contrast  -We will try to obtain hospital records from [facility] in [place] for further details on his perforation and treatment of infection.     Return to clinic in 3 weeks.     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   Lives in [place] x 6 months of the year   Hobbies: writing and weight lifting    Past Surgical History:   Procedure Laterality Date     CHOLECYSTECTOMY       JOINT REPLACEMENT       [place] TRANSURETHRAL ELEC-SURG PROSTATECTOM N/A 9/15/2020    Procedure: CYSTOSCOPY, BIPOLAR TRANSURETHRAL RESECTION OF PROSTATE;  Surgeon: Mario Reynolds MD;  [place] [facility] [place]  Service: Urology        Family History   Problem Relation Age of Onset     Cancer Father         bladder     Aneurysm Father      Dementia Mother      No Medical Problems Sister      No Medical Problems Daughter      No Medical Problems Maternal Grandmother      No Medical Problems Maternal Grandfather      No Medical Problems Paternal Grandmother      No Medical Problems Paternal Grandfather      No Medical Problems Maternal Aunt      No Medical Problems Paternal Aunt      BRCA 1/2 Neg Hx      Breast cancer Neg Hx      Colon cancer Neg Hx      Endometrial cancer Neg Hx      Ovarian cancer Neg Hx         Past Medical History:   Diagnosis Date     BPH  with urinary obstruction      Chronic kidney disease      GERD (gastroesophageal reflux disease) 7/29/2015     Irritable bowel syndrome     Created by Conversion      Osteoarthritis Of The Knee     Created by Conversion  Replacement Utility updated for latest IMO load     Stage 2 chronic kidney disease 7/29/2015     Thyroid nodule 8/15/2018    right side, 2 cm discovered on 8/15/18        Social History     Tobacco Use     Smoking status: Former Smoker     Smokeless tobacco: Never Used   Substance Use Topics     Alcohol use: Yes     Alcohol/week: 2.0 standard drinks     Types: 2 Standard drinks or equivalent per week     Drug use: No        Current Outpatient Medications   Medication Sig Dispense Refill     cefdinir (OMNICEF) 300 MG capsule Take 1 capsule (300 mg total) by mouth 2 (two) times a day for 14 days. 28 capsule 0     famotidine (FOR PEPCID) 10 MG tablet Take 10 mg by mouth every evening.        metroNIDAZOLE (FLAGYL) 500 MG tablet Take 1 tablet (500 mg total) by mouth 3 (three) times a day for 14 days. 42 tablet 0     triamcinolone (KENALOG) 0.1 % cream APPLY TOPICALLY TO AFFECTED AREA BID PRN FOR RASH       No current facility-administered medications for this visit.           Objective:    Vitals:    06/08/21 1325   BP: 130/80   Pulse: 81   SpO2: 98%   Weight: 185 lb (83.9 kg)      Body mass index is 27.32 kg/m .    Alert.  No apparent distress chest clear.  Cardiac exam regular.  Abdomen currently benign and nontoxic.      This note has been dictated using voice recognition software and as a result may contain minor grammatical errors and unintended word substitutions.

## 2021-06-29 ENCOUNTER — COMMUNICATION - HEALTHEAST (OUTPATIENT)
Dept: INFECTIOUS DISEASES | Facility: CLINIC | Age: 76
End: 2021-06-29

## 2021-06-29 DIAGNOSIS — R61 NIGHT SWEATS: ICD-10-CM

## 2021-06-29 DIAGNOSIS — R50.9 FEVER OF UNKNOWN ORIGIN: ICD-10-CM

## 2021-06-29 NOTE — PROGRESS NOTES
Progress Notes by Liana Oakes BSW at 9/8/2020  9:00 AM     Author: Liana Oakes BSW Service: -- Author Type:     Filed: 9/8/2020 10:02 AM Encounter Date: 9/8/2020 Status: Signed    : Liana Oakes BSW ()       Clinic Care Coordination Contact    Clinic Care Coordination Contact  OUTREACH    Referral Information:  BPCI-A: Hospital discharge 8/26/20    Background: Per Chart Review:   presented with fever, chills, rigors.  Recently underwent cystoscopy in urology office.    Was subsequently diagnosed with urinary tract infection and prescribed doxycycline which he had been taking prior to this presentation.    Outpatient urine culture and sensitivities were reviewed, 2 separate organisms were present both sensitive to many antibiotics.  Evaluation here showed leukocytosis, fever, urine that appeared to be infected.     Assessment: ADÁN VILLANUEVA called and completed BPCI-A initial assessment with patient, his wife also participated. Patient shares that he is feel very good and has no concerns regarding his hospital visit. Both patient and his wife are in very good health and are still able to manage most everything independently. Patient shares that he lifts weights and walks to keep in shape.  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  Patient will have an upcoming TURP surgery on 9/15 at [facility]. There will be a 2 day hospital stay. Patient has no concerns about procedure or after care. His wife will be able to stay with him 24 hrs per day during recovery.    ADÁN VILLANUEVA informed patient that we are here to assist if anything comes up. Patient and his wife thanked KAY for the call.         Chief Complaint   Patient presents with   ? Clinic Care Coordination - Initial     BPCI-A        [place] Utilization:   Clinic Utilization  Difficulty keeping appointments:: No  Compliance Concerns: No  No-Show  Concerns: No  No PCP office visit in Past Year: No  Utilization    Last refreshed: 9/8/2020  9:26 AM:  Hospital Admissions 1           Last refreshed: 9/8/2020  9:26 AM:  ED Visits 1           Last refreshed: 9/8/2020  9:26 AM:  No Show Count (past year) 0              Current as of: 9/8/2020  9:26 AM              Clinical Concerns:  Current Medical Concerns:  IBS, Urinary obstruction, Osteoarthritis(knee)  Current Behavioral Concerns: None  Education Provided to patient: BPCI-A program explained to patient.   Pain  Pain (GOAL):: No  Health Maintenance Reviewed:         Medication Management:  Medications reviewed. Manages medications independently    Functional Status:  Dependent ADLs:: Independent  Dependent IADLs:: Independent  Bed or wheelchair confined:: No  Mobility Status: Independent w/Device  Fallen 2 or more times in the past year?: No  Any fall with injury in the past year?: No    Living Situation:  Current living arrangement:: I live in a private home with family  Type of residence:: Private home - no stairs    Lifestyle & Psychosocial Needs:  Lifestyle   ? Physical activity     Days per week: 4 days     Minutes per session: 30 min   ? Stress: Only a little     Social Needs   ? Financial resource strain: Not very hard   ? Food insecurity     Worry: Never true     Inability: Never true   ? Transportation needs     Medical: No     Non-medical: No     Inadequate nutrition (GOAL):: No  Tube Feeding: No  Inadequate activity/exercise (GOAL):: No  Significant changes in sleep pattern (GOAL): No  Transportation means:: Accessible car     Synagogue or spiritual beliefs that impact treatment:: No  Mental health DX:: No  Informal Support system:: Family, Neighbors   Socioeconomic History   ? Marital status:      Spouse name: Not on file   ? Number of children: Not on file   ? Years of education: Not on file   ? Highest education level: Not on file     Tobacco Use   ? Smoking status: Former Smoker   ?  Smokeless tobacco: Never Used   Substance and Sexual Activity   ? Alcohol use: Yes     Alcohol/week: 2.0 standard drinks     Types: 2 Standard drinks or equivalent per week   ? Drug use: No        Resources and Interventions:  Current Resources:  Discussed BPCI-A contact schedule  -Discussed other needs CC could potentially help with ex; MOW, grocery delivery, Home Care set up.     Supplies used at home:: None  Equipment Currently Used at Home: none    Advance Care Plan/Directive  Advanced Care Plans/Directives on file:: Yes     Goals:   Goals        General    Psychosocial (pt-stated)     Notes - Note edited  9/8/2020  9:42 AM by Liana Oakes BSW    Goal Statement:  I want to stay out of the hospital for the next 90 days.  Date Goal set:  9/8/2020  Barriers:  Recent hospitalization(s),   Strengths:  Able to identify and advocate for needs, good support system from family  Date to Achieve By:11/24/2020 (based on hospital discharge date of 8/26/20)  Patient expressed understanding of goal:  Yes  Action steps to achieve this goal:  1. I will take my medications as prescribed  2. I will attend all my appts as scheduled, and recommended follow up appts  3. I will call my clinic if I start to feel sick or notice any change(s) in my health, and schedule an appt if needed  4. I will call the triage nurse line for advice, before going to the hospital  5. I will go to  or Walk-In-Clinic before going to the hospital, if I am unable to get an appt with my PCP  6. I will update the Care Coordination team during outreach calls and ask for additional support or resources, if needed              Patient/Caregiver understanding: Yes       Future Appointments              In 2 days Sandy Wilcox CNP [facility], [facility]    In 3 days Kimi Ram, DO; SEGUNDO PEARSON NURSE [facility], SP[facility] SPINE    In 3 days [facility]          Plan:  will follow  up with patient in one month per BPCI-A standard outreach workflow.   -Patient prefers to be called in morning, around 9am.

## 2021-07-03 NOTE — ADDENDUM NOTE
Addendum Note by Yasmany Orellana PA-C at 8/24/2017 12:14 PM     Author: Yasmany Orellana PA-C Service: -- Author Type: Physician Assistant    Filed: 8/24/2017 12:14 PM Date of Service: 8/24/2017 12:14 PM Status: Signed    : Yasmany Orellana PA-C (Physician Assistant)    Encounter addended by: Yasmany Orellana PA-C on: 8/24/2017 12:14 PM<BR>     Actions taken: Follow-up modified

## 2021-07-04 NOTE — TELEPHONE ENCOUNTER
Telephone Encounter by Nadege Garces, RN at 6/30/2021 12:10 PM     Author: Nadege Garces, RN Service: -- Author Type: Registered Nurse    Filed: 6/30/2021 12:11 PM Encounter Date: 6/28/2021 Status: Signed    : Nadege Garces, RN (Registered Nurse)       I called LM on the pt's personal VM informing that he can f/u PRN.

## 2021-07-04 NOTE — TELEPHONE ENCOUNTER
I called scheduling, the inform me that they get a notice stating the pt's insurance will not cover the CT/PET with the current dx. Scheduling stated no covered dx options are listed. I called LM for the pt to c/b to inform of the above and if he would like to see what the cost is he can call cost of care at [phone].

## 2021-07-04 NOTE — TELEPHONE ENCOUNTER
Telephone Encounter by Nadine Burns CMA at 6/7/2021  3:28 PM     Author: Nadine Burns CMA Service: -- Author Type: Certified Medical Assistant    Filed: 6/7/2021  3:30 PM Encounter Date: 6/7/2021 Status: Signed    : Nadine Burns CMA (Certified Medical Assistant)       Op Note from March 19, 21  Placed copy in providers in basket and sent to scan

## 2021-07-04 NOTE — LETTER
Letter by Urban Barker MD at      Author: Urban Barker MD Service: -- Author Type: --    Filed:  Encounter Date: 5/26/2021 Status: (Other)         Cali Gama  [address]  [facility] [address]             May 26, 2021         Dear Mr. Gama,    Below are the results from your recent visit:    Resulted Orders   C-Reactive Protein   Result Value Ref Range    CRP 0.5 0.0 - 0.8 mg/dL   Erythrocyte Sedimentation Rate   Result Value Ref Range    Sed Rate 7 0 - 15 mm/hr   Comprehensive Metabolic Panel   Result Value Ref Range    Sodium 141 136 - 145 mmol/L    Potassium 4.4 3.5 - 5.0 mmol/L    Chloride 105 98 - 107 mmol/L    CO2 23 22 - 31 mmol/L    Anion Gap, Calculation 13 5 - 18 mmol/L    Glucose 91 70 - 125 mg/dL    BUN 13 8 - 28 mg/dL    Creatinine 1.18 0.70 - 1.30 mg/dL    GFR MDRD Af Amer >60 >60 mL/min/1.73m2    GFR MDRD Non Af Amer 60 (L) >60 mL/min/1.73m2    Bilirubin, Total 0.6 0.0 - 1.0 mg/dL    Calcium 9.4 8.5 - 10.5 mg/dL    Protein, Total 7.1 6.0 - 8.0 g/dL    Albumin 4.1 3.5 - 5.0 g/dL    Alkaline Phosphatase 121 (H) 45 - 120 U/L    AST 20 0 - 40 U/L    ALT 22 0 - 45 U/L    Narrative    Fasting Glucose reference range is 70-99 mg/dL per  American Diabetes Association (ADA) guidelines.   HM1 (CBC with Diff)   Result Value Ref Range    WBC 8.1 4.0 - 11.0 thou/uL    RBC 5.18 4.40 - 6.20 mill/uL    Hemoglobin 16.1 14.0 - 18.0 g/dL    Hematocrit 47.4 40.0 - 54.0 %    MCV 92 80 - 100 fL    MCH 31.1 27.0 - 34.0 pg    MCHC 34.0 32.0 - 36.0 g/dL    RDW 13.3 11.0 - 14.5 %    Platelets 241 140 - 440 thou/uL    MPV 9.2 7.0 - 10.0 fL    Neutrophils % 60 50 - 70 %    Lymphocytes % 23 20 - 40 %    Monocytes % 12 (H) 2 - 10 %    Eosinophils % 3 0 - 6 %    Basophils % 1 0 - 2 %    Immature Granulocyte % 1 (H) <=0 %    Neutrophils Absolute 4.9 2.0 - 7.7 thou/uL    Lymphocytes Absolute 1.9 0.8 - 4.4 thou/uL    Monocytes Absolute 1.0 (H) 0.0 - 0.9 thou/uL    Eosinophils Absolute 0.2 0.0 - 0.4 thou/uL     "Basophils Absolute 0.1 0.0 - 0.2 thou/uL    Immature Granulocyte Absolute 0.0 <=0.0 thou/uL   Thyroid Stimulating Hormone (TSH)   Result Value Ref Range    TSH 0.41 0.30 - 5.00 uIU/mL       Your \"inflammation test\" (i.e. CRP) was normal.     Your \"inflammation test\" (i.e. Sed Rate) was normal.      Your kidney and liver tests were unremarkable.    Your complete blood count results were normal.  No evidence for significant infection, anemia, etc.     Your thyroid screening test (i.e. TSH) was normal.     Please call with questions or contact us using [employer].    Sincerely,        Electronically signed by Urban Barker MD       "

## 2021-07-04 NOTE — TELEPHONE ENCOUNTER
Telephone Encounter by Austen Hutchinson at 6/30/2021  8:33 AM     Author: Austen Hutchinson Service: -- Author Type: Patient Access    Filed: 6/30/2021  8:33 AM Encounter Date: 6/28/2021 Status: Signed    : Austen Hutchinson (Patient Access)       Patient called to cancel his 08.05.2021 appointment w/Dr. Hernandez. He said that the scan is not covered, so he is not sure why he would need the follow up appointment.

## 2021-07-06 VITALS
TEMPERATURE: 98.1 F | SYSTOLIC BLOOD PRESSURE: 140 MMHG | BODY MASS INDEX: 27.62 KG/M2 | DIASTOLIC BLOOD PRESSURE: 92 MMHG | HEART RATE: 72 BPM | WEIGHT: 187 LBS

## 2021-07-06 VITALS
HEART RATE: 81 BPM | DIASTOLIC BLOOD PRESSURE: 80 MMHG | SYSTOLIC BLOOD PRESSURE: 130 MMHG | OXYGEN SATURATION: 98 % | WEIGHT: 185 LBS | BODY MASS INDEX: 27.32 KG/M2

## 2021-07-06 VITALS
OXYGEN SATURATION: 95 % | HEART RATE: 87 BPM | WEIGHT: 189 LBS | SYSTOLIC BLOOD PRESSURE: 140 MMHG | BODY MASS INDEX: 27.91 KG/M2 | DIASTOLIC BLOOD PRESSURE: 80 MMHG

## 2021-07-07 NOTE — TELEPHONE ENCOUNTER
Dr Hernandez & Team    Calling in regards of NM PET CT Skull to Mid Thigh order.      Patient attempted to schedule but radiology told them that due to wrong DX, unable to schedule. Please fix the DX and notify patient when this has been completed @ [phone]

## 2021-07-29 ENCOUNTER — TRANSFERRED RECORDS (OUTPATIENT)
Dept: HEALTH INFORMATION MANAGEMENT | Facility: CLINIC | Age: 76
End: 2021-07-29

## 2021-08-04 ENCOUNTER — OFFICE VISIT (OUTPATIENT)
Dept: FAMILY MEDICINE | Facility: CLINIC | Age: 76
End: 2021-08-04
Payer: MEDICARE

## 2021-08-04 VITALS
WEIGHT: 187.1 LBS | BODY MASS INDEX: 28.36 KG/M2 | DIASTOLIC BLOOD PRESSURE: 80 MMHG | HEIGHT: 68 IN | OXYGEN SATURATION: 96 % | HEART RATE: 67 BPM | SYSTOLIC BLOOD PRESSURE: 120 MMHG

## 2021-08-04 DIAGNOSIS — Z01.818 PREOP GENERAL PHYSICAL EXAM: Primary | ICD-10-CM

## 2021-08-04 DIAGNOSIS — Z98.890 S/P ERCP: ICD-10-CM

## 2021-08-04 DIAGNOSIS — K21.9 GASTROESOPHAGEAL REFLUX DISEASE, UNSPECIFIED WHETHER ESOPHAGITIS PRESENT: ICD-10-CM

## 2021-08-04 DIAGNOSIS — K63.1 PERFORATION OF INTESTINE (H): ICD-10-CM

## 2021-08-04 DIAGNOSIS — N18.30 STAGE 3 CHRONIC KIDNEY DISEASE, UNSPECIFIED WHETHER STAGE 3A OR 3B CKD (H): ICD-10-CM

## 2021-08-04 DIAGNOSIS — N40.1 BENIGN PROSTATIC HYPERPLASIA WITH LOWER URINARY TRACT SYMPTOMS, SYMPTOM DETAILS UNSPECIFIED: ICD-10-CM

## 2021-08-04 LAB
ANION GAP SERPL CALCULATED.3IONS-SCNC: 8 MMOL/L (ref 5–18)
BUN SERPL-MCNC: 13 MG/DL (ref 8–28)
CALCIUM SERPL-MCNC: 10 MG/DL (ref 8.5–10.5)
CHLORIDE BLD-SCNC: 105 MMOL/L (ref 98–107)
CO2 SERPL-SCNC: 28 MMOL/L (ref 22–31)
CREAT SERPL-MCNC: 1.13 MG/DL (ref 0.7–1.3)
GFR SERPL CREATININE-BSD FRML MDRD: 62 ML/MIN/1.73M2
GLUCOSE BLD-MCNC: 96 MG/DL (ref 70–125)
HGB BLD-MCNC: 17.1 G/DL (ref 13.3–17.7)
POTASSIUM BLD-SCNC: 4.4 MMOL/L (ref 3.5–5)
SODIUM SERPL-SCNC: 141 MMOL/L (ref 136–145)

## 2021-08-04 PROCEDURE — 36415 COLL VENOUS BLD VENIPUNCTURE: CPT | Performed by: NURSE PRACTITIONER

## 2021-08-04 PROCEDURE — 85018 HEMOGLOBIN: CPT | Performed by: NURSE PRACTITIONER

## 2021-08-04 PROCEDURE — 99214 OFFICE O/P EST MOD 30 MIN: CPT | Performed by: NURSE PRACTITIONER

## 2021-08-04 PROCEDURE — 80048 BASIC METABOLIC PNL TOTAL CA: CPT | Performed by: NURSE PRACTITIONER

## 2021-08-04 RX ORDER — FAMOTIDINE 10 MG
10 TABLET ORAL
COMMUNITY
End: 2021-08-04

## 2021-08-04 ASSESSMENT — MIFFLIN-ST. JEOR: SCORE: 1543.18

## 2021-08-04 NOTE — PROGRESS NOTES
[facility]  [address]  [facility] [address]  Phone: [phone]  Fax: [phone]  Primary Provider: Urban Barker  Pre-op Performing Provider: MAYKEL FRNACO    PREOPERATIVE EVALUATION:  Today's date: 8/4/2021    Cali Gama is a 76 year old male who presents for a preoperative evaluation.    Surgical Information:  Surgery/Procedure: removal stent   Surgery Location: [facility]   Surgeon: Dr. Ramos  Surgery Date:8/10/21  Time of Surgery: 1:00 pm   Where patient plans to recover: At home with family  Fax number for surgical facility:     Type of Anesthesia Anticipated: General    Assessment & Plan     The proposed surgical procedure is considered INTERMEDIATE risk.    Preop general physical exam  Perforation of intestine (H)  S/P ERCP  .  Exam pleated today.  Exam is without any significant findings.  Lab work remained stable as noted below.  Reviewed patient's medications do not understand why divided.  Heay proceed with scheduled procedure with choice of anesthesia.  - Basic metabolic panel  (Ca, Cl, CO2, Creat, Gluc, K, Na, BUN)  - Hemoglobin  - Basic metabolic panel  (Ca, Cl, CO2, Creat, Gluc, K, Na, BUN)  - Hemoglobin    Benign prostatic hyperplasia with lower urinary tract symptoms, symptom details unspecified  Stable without need for medication at this time.    Stage 3 chronic kidney disease, unspecified whether stage 3a or 3b CKD  Metabolic  B[place]ick panel remained stable.  We will continue to monitor.    Gastroesophageal reflux disease, unspecified whether esophagitis present  Continue Pepcid for management.      Risks and Recommendations:  The patient has the following additional risks and recommendations for perioperative complications:   - No identified additional risk factors other than previously addressed    Medication Instructions:  Patient is on no chronic medications    RECOMMENDATION:  APPROVAL GIVEN to proceed with proposed procedure,  without further diagnostic evaluation.        Subjective     HPI related to upcoming procedure: Patient with history of choledocholithiasis concerns.  He underwent a biliary stone removal in March 2021 in [place].  Plan was to have subsequent metal stent removal 6 months after initial placement in March 2021 however this was complicated by an intestinal perforation and underwent a second ERCP with stent placement.  He has had persistent fevers and night sweats and was referred to Dr. Hernandez and infectious disease. His CTs have indicated no occult infection.  He was placed on empiric antibiotics which improved his symptoms.  He will now be having stents removed.  Medical history significant significant for BPH and GERD, otherwise no significant past medical history.  Currently using Pepcid as needed for reflux.  He reportedly well today.  No recent illness other than symptoms noted above.  Patient we have Covid completed prior to procedure.  No history of air clotting disorders.    Preop Questions 8/4/2021   1. Have you ever had a heart attack or stroke? No   2. Have you ever had surgery on your heart or blood vessels, such as a stent placement, a coronary artery bypass, or surgery on an artery in your head, neck, heart, or legs? No   3. Do you have chest pain with activity? No   4. Do you have a history of  heart failure? No   5. Do you currently have a cold, bronchitis or symptoms of other infection? No   6. Do you have a cough, shortness of breath, or wheezing? No   7. Do you or anyone in your family have previous history of blood clots? No   8. Do you or does anyone in your family have a serious bleeding problem such as prolonged bleeding following surgeries or cuts? No   9. Have you ever had problems with anemia or been told to take iron pills? No   10. Have you had any abnormal blood loss such as black, tarry or bloody stools? No   11. Have you ever had a blood transfusion? No   12. Are you willing to have a  blood transfusion if it is medically needed before, during, or after your surgery? Yes   13. Have you or any of your relatives ever had problems with anesthesia? No   14. Do you have sleep apnea, excessive snoring or daytime drowsiness? No   15. Do you have any artifical heart valves or other implanted medical devices like a pacemaker, defibrillator, or continuous glucose monitor? No   16. Do you have artificial joints? YES - bilateral knee replacements   17. Are you allergic to latex? No       Health Care Directive:  Patient does not have a Health Care Directive or Living Will: Discussed advance care planning with patient; however, patient declined at this time.    Preoperative Review of :   reviewed - no record of controlled substances prescribed.      Status of Chronic Conditions:  See problem list for active medical problems.  Problems all longstanding and stable, except as noted/documented.  See ROS for pertinent symptoms related to these conditions.      Review of Systems  CONSTITUTIONAL: NEGATIVE for fever, chills, change in weight  INTEGUMENTARY/SKIN: NEGATIVE for worrisome rashes, moles or lesions  EYES: NEGATIVE for vision changes or irritation  ENT/MOUTH: NEGATIVE for ear, mouth and throat problems  RESP: NEGATIVE for significant cough or SOB  CV: NEGATIVE for chest pain, palpitations or peripheral edema  GI: NEGATIVE for nausea, abdominal pain, heartburn, or change in bowel habits  : NEGATIVE for frequency, dysuria, or hematuria  MUSCULOSKELETAL: NEGATIVE for significant arthralgias or myalgia  NEURO: NEGATIVE for weakness, dizziness or paresthesias  ENDOCRINE: NEGATIVE for temperature intolerance, skin/hair changes  HEME: NEGATIVE for bleeding problems  PSYCHIATRIC: NEGATIVE for changes in mood or affect    Patient Active Problem List    Diagnosis Date Noted     Chronic kidney disease, stage 3 05/25/2021     Priority: Medium     BPH with urinary obstruction      Priority: Medium     Created by  Conversion         UTI (urinary tract infection) 08/23/2020     Priority: Medium     Sepsis, due to unspecified organism, unspecified whether acute organ dysfunction present (H)      Priority: Medium     Benign prostatic hyperplasia with lower urinary tract symptoms, symptom details unspecified      Priority: Medium     Thyroid nodule 08/15/2018     Priority: Medium     right side, 2 cm discovered on 8/15/18         Osteoarthritis Of The Knee      Priority: Medium     Created by Conversion  Replacement Utility updated for latest IMO load         GERD (gastroesophageal reflux disease) 07/29/2015     Priority: Medium     Irritable Bowel Syndrome      Priority: Medium     Created by Conversion         Hypertrophy Of Breast      Priority: Medium     Created by Conversion         Joint Pain, Localized In The Left Shoulder      Priority: Medium     Created by Conversion         Blood Pressure Isolated Elevated      Priority: Medium     Created by Conversion          Past Medical History:   Diagnosis Date     BPH with urinary obstruction      Chronic kidney disease      GERD (gastroesophageal reflux disease) 7/29/2015     Irritable bowel syndrome     Created by Conversion      Osteoarthrosis involving lower leg     Created by Conversion  Replacement Utility updated for latest IMO load     Stage 2 chronic kidney disease 7/29/2015     Thyroid nodule 8/15/2018    right side, 2 cm discovered on 8/15/18     Past Surgical History:   Procedure Laterality Date     CHOLECYSTECTOMY       HC TRANSURETHRAL ELEC-SURG PROSTATECTOM N/A 9/15/2020    Procedure: CYSTOSCOPY, BIPOLAR TRANSURETHRAL RESECTION OF PROSTATE;  Surgeon: Mario Reynolds MD;  Location: [facility];  Service: Urology     JOINT REPLACEMENT       Current Outpatient Medications   Medication Sig Dispense Refill     famotidine (FOR PEPCID) 10 MG tablet [FAMOTIDINE (FOR PEPCID) 10 MG TABLET] Take 10 mg by mouth every evening.        triamcinolone (KENALOG) 0.1 %  "cream [TRIAMCINOLONE (KENALOG) 0.1 % CREAM] APPLY TOPICALLY TO AFFECTED AREA BID PRN FOR RASH         Allergies   Allergen Reactions     Ciprofloxacin Other (See Comments)     tendonitis     Morphine Unknown     Patient states that he cannot urinate after taking morphine.     Vitamin A Unknown     Paba Derivatives        Social History     Tobacco Use     Smoking status: Former Smoker     Smokeless tobacco: Never Used   Substance Use Topics     Alcohol use: Yes     Alcohol/week: 2.0 standard drinks     Family History   Problem Relation Age of Onset     Cancer Father         bladder     Aneurysm Father      Dementia Mother      No Known Problems Sister      No Known Problems Daughter      No Known Problems Maternal Grandmother      No Known Problems Maternal Grandfather      No Known Problems Paternal Grandmother      No Known Problems Paternal Grandfather      No Known Problems Maternal Aunt      No Known Problems Paternal Aunt      Hereditary Breast and Ovarian Cancer Syndrome No family hx of      Breast Cancer No family hx of      Colon Cancer No family hx of      Endometrial Cancer No family hx of      Ovarian Cancer No family hx of      History   Drug Use No         Objective     /80 (BP Location: Left arm, Patient Position: Sitting, Cuff Size: Adult Regular)   Pulse 67   Ht 1.727 m (5' 8\")   Wt 84.9 kg (187 lb 1.6 oz)   SpO2 96%   BMI 28.45 kg/m      Physical Exam    GENERAL APPEARANCE: healthy, alert and no distress     EYES: EOMI,  PERRL     HENT: ear canals and TM's normal and nose and mouth without ulcers or lesions     NECK: no adenopathy, no asymmetry, masses, or scars and thyroid normal to palpation     RESP: lungs clear to auscultation - no rales, rhonchi or wheezes     CV: regular rates and rhythm, normal S1 S2, no S3 or S4 and no murmur, click or rub     ABDOMEN:  soft, nontender, no HSM or masses and bowel sounds normal     MS: extremities normal- no gross deformities noted, no evidence of " inflammation in joints, FROM in all extremities.     SKIN: no suspicious lesions or rashes     NEURO: Normal strength and tone, sensory exam grossly normal, mentation intact and speech normal     PSYCH: mentation appears normal. and affect normal/bright     LYMPHATICS: No cervical adenopathy    Recent Results (from the past 240 hour(s))   Basic metabolic panel  (Ca, Cl, CO2, Creat, Gluc, K, Na, BUN)    Collection Time: 08/04/21 10:25 AM   Result Value Ref Range    Sodium 141 136 - 145 mmol/L    Potassium 4.4 3.5 - 5.0 mmol/L    Chloride 105 98 - 107 mmol/L    Carbon Dioxide (CO2) 28 22 - 31 mmol/L    Anion Gap 8 5 - 18 mmol/L    Urea Nitrogen 13 8 - 28 mg/dL    Creatinine 1.13 0.70 - 1.30 mg/dL    Calcium 10.0 8.5 - 10.5 mg/dL    Glucose 96 70 - 125 mg/dL    GFR Estimate 62 >60 mL/min/1.73m2   Hemoglobin    Collection Time: 08/04/21 10:25 AM   Result Value Ref Range    Hemoglobin 17.1 13.3 - 17.7 g/dL       Diagnostics:  Labs pending at this time.  Results will be reviewed when available.   No EKG required, no history of coronary heart disease, significant arrhythmia, peripheral arterial disease or other structural heart disease.    Revised Cardiac Risk Index (RCRI):  The patient has the following serious cardiovascular risks for perioperative complications:   - No serious cardiac risks = 0 points     RCRI Interpretation: 0 points: Class I (very low risk - 0.4% complication rate)           Signed Electronically by: JOSE Mendoza CNP  Copy of this evaluation report is provided to requesting physician.

## 2021-08-17 ENCOUNTER — OFFICE VISIT (OUTPATIENT)
Dept: PODIATRY | Facility: CLINIC | Age: 76
End: 2021-08-17
Payer: MEDICARE

## 2021-08-17 VITALS — SYSTOLIC BLOOD PRESSURE: 136 MMHG | HEART RATE: 64 BPM | DIASTOLIC BLOOD PRESSURE: 78 MMHG | RESPIRATION RATE: 18 BRPM

## 2021-08-17 DIAGNOSIS — L57.0 KERATOMA: ICD-10-CM

## 2021-08-17 DIAGNOSIS — M19.072 OSTEOARTHRITIS OF LEFT ANKLE AND FOOT: Primary | ICD-10-CM

## 2021-08-17 DIAGNOSIS — M21.622 TAILOR'S BUNION OF BOTH FEET: ICD-10-CM

## 2021-08-17 DIAGNOSIS — I79.8 OTHER DISORDERS OF ARTERIES, ARTERIOLES AND CAPILLARIES IN DISEASES CLASSIFIED ELSEWHERE (H): ICD-10-CM

## 2021-08-17 DIAGNOSIS — M21.621 TAILOR'S BUNION OF BOTH FEET: ICD-10-CM

## 2021-08-17 PROCEDURE — 11056 PARNG/CUTG B9 HYPRKR LES 2-4: CPT | Mod: Q9 | Performed by: PODIATRIST

## 2021-08-17 PROCEDURE — 99203 OFFICE O/P NEW LOW 30 MIN: CPT | Mod: 25 | Performed by: PODIATRIST

## 2021-08-17 ASSESSMENT — PAIN SCALES - GENERAL: PAINLEVEL: MODERATE PAIN (5)

## 2021-08-17 NOTE — PATIENT INSTRUCTIONS
Stop and get x-ray before you leave.      Get MRI on left foot, Dr. Cary will call you with the results.  Please call [facility] to schedule [phone].    Use pumice stone on pressure areas of your right foot 2 to 3 times per week to avoid callous build-up.  If callous getsthick and pumice stone is not effective at removing the callous, please call clinic to be seen for sharp debridement.

## 2021-08-17 NOTE — PROGRESS NOTES
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 Pain dorsal 4th TMT left foot. No pain along 4th and 5th rays left foot. Callus tissue x2 plantar 5th MPJ right foot.    -I reviewed the above with the patient and discussed that an MRI of the left foot may be beneficial to better identify if the 4th TMT is the primary area of concern and to proceed with a steroid injection with IR if necessary. He agrees with the treatment plan.    -I trimmed callus tissue x2 plantar 5th MPJ right foot. Recommend use of a pumice stone 2-3x per week.    -Patient's questions invited and answered. I will contact him with the MRI report when available and we will be guided by the results. He was encouraged to call my office with any further questions or concerns.     Renato Cary DPM  [facility] Podiatry/Foot & Ankle Surgery  [phone]      HPI: I was asked to see Cali Gama today for bilateral foot pain. He reports noticing increasing pain along the dorsal lateral left foot over the past several weeks. Denies trauma or twisting injury. He also complains of painful callus tissue along the plantar 5th MPJ on the right foot and concerns about splaying of his feet.       Past Medical History:   Diagnosis Date     BPH with urinary obstruction      Chronic kidney disease      GERD (gastroesophageal reflux disease) 7/29/2015     Irritable bowel syndrome     Created by Conversion      Osteoarthrosis involving lower leg     Created by Conversion  Replacement Utility updated for latest IMO load     Stage 2 chronic kidney disease 7/29/2015     Thyroid nodule 8/15/2018    right side, 2 cm discovered on 8/15/18         Social History     Socioeconomic History     Marital status:      Spouse name: Not on file     Number of children: Not on file     Years of education: Not on file     Highest  education level: Not on file   Occupational History     Not on file   Tobacco Use     Smoking status: Former Smoker     Smokeless tobacco: Never Used   Substance and Sexual Activity     Alcohol use: Yes     Alcohol/week: 2.0 standard drinks     Drug use: No     Sexual activity: Not on file   Other Topics Concern     Not on file   Social History Narrative     Not on file     Social Determinants of Health     Financial Resource Strain:      Difficulty of Paying Living Expenses:    Food Insecurity:      Worried About Running Out of Food in the Last Year:      Ran Out of Food in the Last Year:    Transportation Needs:      Lack of Transportation (Medical):      Lack of Transportation (Non-Medical):    Physical Activity:      Days of Exercise per Week:      Minutes of Exercise per Session:    Stress:      Feeling of Stress :    Social Connections:      Frequency of Communication with Friends and Family:      Frequency of Social Gatherings with Friends and Family:      Attends Bahai Services:      Active Member of Clubs or Organizations:      Attends Club or Organization Meetings:      Marital Status:    Intimate Partner Violence:      Fear of Current or Ex-Partner:      Emotionally Abused:      Physically Abused:      Sexually Abused:             Allergies   Allergen Reactions     Ciprofloxacin Other (See Comments)     tendonitis     Morphine Unknown     Patient states that he cannot urinate after taking morphine.     Vitamin A Unknown     Paba Derivatives         MEDICATIONS:   Current Outpatient Medications   Medication     famotidine (FOR PEPCID) 10 MG tablet     triamcinolone (KENALOG) 0.1 % cream     No current facility-administered medications for this visit.        Family History   Problem Relation Age of Onset     Cancer Father         bladder     Aneurysm Father      Dementia Mother      No Known Problems Sister      No Known Problems Daughter      No Known Problems Maternal Grandmother      No Known  Problems Maternal Grandfather      No Known Problems Paternal Grandmother      No Known Problems Paternal Grandfather      No Known Problems Maternal Aunt      No Known Problems Paternal Aunt      Hereditary Breast and Ovarian Cancer Syndrome No family hx of      Breast Cancer No family hx of      Colon Cancer No family hx of      Endometrial Cancer No family hx of      Ovarian Cancer No family hx of           Review of Systems - 10 point Review of Systems is negative except for foot pain which is noted in HPI.    OBJECTIVE:  Appearance: alert, well appearing, and in no distress.    VITAL SIGNS: /78   Pulse 64   Resp 18       General appearance: Patient is alert and fully cooperative with history & exam.  No sign of distress is noted during the visit.     Psychiatric: Affect is pleasant & appropriate.  Patient appears motivated to improve health.     Respiratory: Breathing is regular & unlabored while sitting.     HEENT: Hearing is intact to spoken word.  Speech is clear.  No gross evidence of visual impairment that would impact ambulation.      Vascular: Dorsalis pedis palpable. There is pedal hair growth bilateral.  CFT < 3 sec from anterior tibial surface to distal digits bilateral. There is no appreciable edema noted.  Dermatologic: Turgor and texture are within normal limits. No coloration or temperature changes. No primary or secondary lesions noted. Hyperkeratotic tissue x2 plantar 5th MPJ right foot.   Neurologic: All epicritic and proprioceptive sensations are grossly intact bilateral.  Musculoskeletal: Mild tailor's bunion bilateral. Pain dorsal 4th TMT left foot. No pain along 4th and 5th rays left foot.

## 2021-08-17 NOTE — LETTER
8/17/2021         RE: Cali Gama  [address]  [facility] [address]        Dear Colleague,    Thank you for referring your patient, Cali Gama, to the [facility]. Please see a copy of my visit note below.          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 Pain dorsal 4th TMT left foot. No pain along 4th and 5th rays left foot. Callus tissue x2 plantar 5th MPJ right foot.    -I reviewed the above with the patient and discussed that an MRI of the left foot may be beneficial to better identify if the 4th TMT is the primary area of concern and to proceed with a steroid injection with IR if necessary. He agrees with the treatment plan.    -I trimmed callus tissue x2 plantar 5th MPJ right foot. Recommend use of a pumice stone 2-3x per week.    -Patient's questions invited and answered. I will contact him with the MRI report when available and we will be guided by the results. He was encouraged to call my office with any further questions or concerns.     Renato Cary DPM  [facility] Podiatry/Foot & Ankle Surgery  [phone]      HPI: I was asked to see Cali Gama today for bilateral foot pain. He reports noticing increasing pain along the dorsal lateral left foot over the past several weeks. Denies trauma or twisting injury. He also complains of painful callus tissue along the plantar 5th MPJ on the right foot and concerns about splaying of his feet.       Past Medical History:   Diagnosis Date     BPH with urinary obstruction      Chronic kidney disease      GERD (gastroesophageal reflux disease) 7/29/2015     Irritable bowel syndrome     Created by Conversion      Osteoarthrosis involving lower leg     Created by Conversion  Replacement Utility updated for latest IMO load     Stage 2 chronic kidney disease 7/29/2015      Thyroid nodule 8/15/2018    right side, 2 cm discovered on 8/15/18         Social History     Socioeconomic History     Marital status:      Spouse name: Not on file     Number of children: Not on file     Years of education: Not on file     Highest education level: Not on file   Occupational History     Not on file   Tobacco Use     Smoking status: Former Smoker     Smokeless tobacco: Never Used   Substance and Sexual Activity     Alcohol use: Yes     Alcohol/week: 2.0 standard drinks     Drug use: No     Sexual activity: Not on file   Other Topics Concern     Not on file   Social History Narrative     Not on file     Social Determinants of Health     Financial Resource Strain:      Difficulty of Paying Living Expenses:    Food Insecurity:      Worried About Running Out of Food in the Last Year:      Ran Out of Food in the Last Year:    Transportation Needs:      Lack of Transportation (Medical):      Lack of Transportation (Non-Medical):    Physical Activity:      Days of Exercise per Week:      Minutes of Exercise per Session:    Stress:      Feeling of Stress :    Social Connections:      Frequency of Communication with Friends and Family:      Frequency of Social Gatherings with Friends and Family:      Attends Mormonism Services:      Active Member of Clubs or Organizations:      Attends Club or Organization Meetings:      Marital Status:    Intimate Partner Violence:      Fear of Current or Ex-Partner:      Emotionally Abused:      Physically Abused:      Sexually Abused:             Allergies   Allergen Reactions     Ciprofloxacin Other (See Comments)     tendonitis     Morphine Unknown     Patient states that he cannot urinate after taking morphine.     Vitamin A Unknown     Paba Derivatives         MEDICATIONS:   Current Outpatient Medications   Medication     famotidine (FOR PEPCID) 10 MG tablet     triamcinolone (KENALOG) 0.1 % cream     No current facility-administered medications for this visit.         Family History   Problem Relation Age of Onset     Cancer Father         bladder     Aneurysm Father      Dementia Mother      No Known Problems Sister      No Known Problems Daughter      No Known Problems Maternal Grandmother      No Known Problems Maternal Grandfather      No Known Problems Paternal Grandmother      No Known Problems Paternal Grandfather      No Known Problems Maternal Aunt      No Known Problems Paternal Aunt      Hereditary Breast and Ovarian Cancer Syndrome No family hx of      Breast Cancer No family hx of      Colon Cancer No family hx of      Endometrial Cancer No family hx of      Ovarian Cancer No family hx of           Review of Systems - 10 point Review of Systems is negative except for foot pain which is noted in HPI.    OBJECTIVE:  Appearance: alert, well appearing, and in no distress.    VITAL SIGNS: /78   Pulse 64   Resp 18       General appearance: Patient is alert and fully cooperative with history & exam.  No sign of distress is noted during the visit.     Psychiatric: Affect is pleasant & appropriate.  Patient appears motivated to improve health.     Respiratory: Breathing is regular & unlabored while sitting.     HEENT: Hearing is intact to spoken word.  Speech is clear.  No gross evidence of visual impairment that would impact ambulation.      Vascular: Dorsalis pedis palpable. There is pedal hair growth bilateral.  CFT < 3 sec from anterior tibial surface to distal digits bilateral. There is no appreciable edema noted.  Dermatologic: Turgor and texture are within normal limits. No coloration or temperature changes. No primary or secondary lesions noted. Hyperkeratotic tissue x2 plantar 5th MPJ right foot.   Neurologic: All epicritic and proprioceptive sensations are grossly intact bilateral.  Musculoskeletal: Mild tailor's bunion bilateral. Pain dorsal 4th TMT left foot. No pain along 4th and 5th rays left foot.               Again, thank you for allowing me to  participate in the care of your patient.        Sincerely,        Renato Cary DPM

## 2021-08-19 ENCOUNTER — TELEPHONE (OUTPATIENT)
Dept: PODIATRY | Facility: CLINIC | Age: 76
End: 2021-08-19

## 2021-08-19 DIAGNOSIS — M19.072 OSTEOARTHRITIS OF LEFT ANKLE AND FOOT: Primary | ICD-10-CM

## 2021-08-19 NOTE — TELEPHONE ENCOUNTER
I reviewed the MRI report with the patient which is positive for moderate DJD on the 4th and 5th TMT on the left foot. I recommend and have referred him for a steroid injection with IR.

## 2021-08-23 NOTE — TELEPHONE ENCOUNTER
Pt called back stating wherever he was sent to had no knowledge of what needs to be done for injection. Please call pt back to assist.

## 2021-08-23 NOTE — TELEPHONE ENCOUNTER
Spoke to Interventional Radiology and they stated that the patient should contact central scheduling to schedule the steroid injection. Placed call to central scheduling and spoke with Carline who confirmed that is the correct place for patient to call to schedule the foot steroid injection.    Called Cali and provided the number to schedule [phone]

## 2021-08-24 DIAGNOSIS — Z11.59 ENCOUNTER FOR SCREENING FOR OTHER VIRAL DISEASES: ICD-10-CM

## 2021-08-30 ENCOUNTER — LAB (OUTPATIENT)
Dept: LAB | Facility: CLINIC | Age: 76
End: 2021-08-30
Payer: MEDICARE

## 2021-08-30 DIAGNOSIS — Z11.59 ENCOUNTER FOR SCREENING FOR OTHER VIRAL DISEASES: ICD-10-CM

## 2021-08-30 LAB — SARS-COV-2 RNA RESP QL NAA+PROBE: NEGATIVE

## 2021-08-30 PROCEDURE — U0005 INFEC AGEN DETEC AMPLI PROBE: HCPCS

## 2021-08-30 PROCEDURE — U0003 INFECTIOUS AGENT DETECTION BY NUCLEIC ACID (DNA OR RNA); SEVERE ACUTE RESPIRATORY SYNDROME CORONAVIRUS 2 (SARS-COV-2) (CORONAVIRUS DISEASE [COVID-19]), AMPLIFIED PROBE TECHNIQUE, MAKING USE OF HIGH THROUGHPUT TECHNOLOGIES AS DESCRIBED BY CMS-2020-01-R: HCPCS

## 2021-09-02 ENCOUNTER — HOSPITAL ENCOUNTER (OUTPATIENT)
Dept: RADIOLOGY | Facility: CLINIC | Age: 76
Discharge: HOME | End: 2021-09-02
Attending: PODIATRIST | Admitting: RADIOLOGY
Payer: MEDICARE

## 2021-09-02 PROCEDURE — 255N000002 HC RX 255 OP 636: Performed by: PODIATRIST

## 2021-09-02 PROCEDURE — 20605 DRAIN/INJ JOINT/BURSA W/O US: CPT | Mod: LT

## 2021-09-02 RX ORDER — IOPAMIDOL 612 MG/ML
1 INJECTION, SOLUTION INTRAVASCULAR ONCE
Status: COMPLETED | OUTPATIENT
Start: 2021-09-02 | End: 2021-09-02

## 2021-09-02 RX ORDER — BUPIVACAINE HYDROCHLORIDE 2.5 MG/ML
2 INJECTION, SOLUTION INFILTRATION; PERINEURAL ONCE
Status: COMPLETED | OUTPATIENT
Start: 2021-09-02 | End: 2021-09-02

## 2021-09-02 RX ORDER — TRIAMCINOLONE ACETONIDE 40 MG/ML
40 INJECTION, SUSPENSION INTRA-ARTICULAR; INTRAMUSCULAR ONCE
Status: COMPLETED | OUTPATIENT
Start: 2021-09-02 | End: 2021-09-02

## 2021-09-02 RX ADMIN — TRIAMCINOLONE ACETONIDE 40 MG: 40 INJECTION, SUSPENSION INTRA-ARTICULAR; INTRAMUSCULAR at 14:10

## 2021-09-02 RX ADMIN — IOPAMIDOL 1 ML: 612 INJECTION, SOLUTION INTRAVENOUS at 14:10

## 2021-09-02 RX ADMIN — BUPIVACAINE HYDROCHLORIDE 5 MG: 2.5 INJECTION, SOLUTION INFILTRATION; PERINEURAL at 14:08

## 2021-09-21 ENCOUNTER — TRANSFERRED RECORDS (OUTPATIENT)
Dept: HEALTH INFORMATION MANAGEMENT | Facility: CLINIC | Age: 76
End: 2021-09-21

## 2021-09-21 LAB
ALT SERPL-CCNC: 23 IU/L (ref 0–44)
AST SERPL-CCNC: 22 IU/L (ref 0–40)

## 2021-09-28 ENCOUNTER — OFFICE VISIT (OUTPATIENT)
Dept: PODIATRY | Facility: CLINIC | Age: 76
End: 2021-09-28
Payer: MEDICARE

## 2021-09-28 VITALS — RESPIRATION RATE: 20 BRPM | HEART RATE: 60 BPM | DIASTOLIC BLOOD PRESSURE: 78 MMHG | SYSTOLIC BLOOD PRESSURE: 130 MMHG

## 2021-09-28 DIAGNOSIS — M24.573 EQUINUS CONTRACTURE OF ANKLE: ICD-10-CM

## 2021-09-28 DIAGNOSIS — M72.2 PLANTAR FASCIITIS OF RIGHT FOOT: Primary | ICD-10-CM

## 2021-09-28 PROCEDURE — 99213 OFFICE O/P EST LOW 20 MIN: CPT | Mod: 25 | Performed by: PODIATRIST

## 2021-09-28 PROCEDURE — 20550 NJX 1 TENDON SHEATH/LIGAMENT: CPT | Mod: RT | Performed by: PODIATRIST

## 2021-09-28 RX ORDER — TRIAMCINOLONE ACETONIDE 40 MG/ML
40 INJECTION, SUSPENSION INTRA-ARTICULAR; INTRAMUSCULAR ONCE
Status: COMPLETED | OUTPATIENT
Start: 2021-09-28 | End: 2021-09-28

## 2021-09-28 RX ADMIN — TRIAMCINOLONE ACETONIDE 40 MG: 40 INJECTION, SUSPENSION INTRA-ARTICULAR; INTRAMUSCULAR at 09:12

## 2021-09-28 ASSESSMENT — PAIN SCALES - GENERAL: PAINLEVEL: EXTREME PAIN (9)

## 2021-09-28 NOTE — PATIENT INSTRUCTIONS
Today in clinic you received an injection of Kenalog mixed with lidocaine. As the lidocaine wears off you may experience more pain. This pain may last up to several days as the injection sets up. It can take up to a week before you feel the full benefit of your injection.    Please call our office if you begin to see signs of an infection. Signs or symptoms of an infection may include: fever, redness, warmth, or swelling at the injection sight, and discolored drainage. [phone].

## 2021-09-28 NOTE — PROGRESS NOTES
FOOT AND ANKLE SURGERY/PODIATRY Progress Note        ASSESSMENT:   Plantar Fasciitis  Gastrosoleus Equinus       TREATMENT:  -Patient has pain along the plantar heel at insertion of plantar fascia consistent with plantar fasciitis. We discussed treatment options to include stretching exercises, anti-inflammatory medication, orthotics, steroid injections, physical therapy and a night splint. He would like to try a steroid injection today.    -Injected 1 cc Kenalog 40/0.5 cc 2% lidocaine plain right heel. Patient tolerated the procedure well. Band aid applied.     -He declines custom orthotics at this time, but will consider if his symptoms do not improve.     -I have asked him to follow-up with me if symptoms do not improve over the next few weeks.    Renato Cary DPM  [facility] Podiatry/Foot & Ankle Surgery      HPI: Patient returns to see me today for right heel pain which started over the past 2-3 weeks. He is known to me having recently underwent a steroid injection in the left foot with IR which as completely reduced his pain. He believes increased walking is to blame for the right heel pain.     Past Medical History:   Diagnosis Date     BPH with urinary obstruction      Chronic kidney disease      GERD (gastroesophageal reflux disease) 7/29/2015     Irritable bowel syndrome     Created by Conversion      Osteoarthrosis involving lower leg     Created by Conversion  Replacement Utility updated for latest IMO load     Stage 2 chronic kidney disease 7/29/2015     Thyroid nodule 8/15/2018    right side, 2 cm discovered on 8/15/18       Past Surgical History:   Procedure Laterality Date     CHOLECYSTECTOMY       HC TRANSURETHRAL ELEC-SURG PROSTATECTOM N/A 9/15/2020    Procedure: CYSTOSCOPY, BIPOLAR TRANSURETHRAL RESECTION OF PROSTATE;  Surgeon: Mario Reynolds MD;  Location: [facility] OR;  Service: Urology     JOINT REPLACEMENT         Allergies   Allergen Reactions     Ciprofloxacin Other  (See Comments)     tendonitis     Morphine Unknown     Patient states that he cannot urinate after taking morphine.     Vitamin A Unknown     Paba Derivatives         Current Outpatient Medications:      famotidine (FOR PEPCID) 10 MG tablet, [FAMOTIDINE (FOR PEPCID) 10 MG TABLET] Take 10 mg by mouth every evening.  (Patient not taking: Reported on 9/28/2021), Disp: , Rfl:      triamcinolone (KENALOG) 0.1 % cream, [TRIAMCINOLONE (KENALOG) 0.1 % CREAM] APPLY TOPICALLY TO AFFECTED AREA BID PRN FOR RASH (Patient not taking: Reported on 9/28/2021), Disp: , Rfl:     Family History   Problem Relation Age of Onset     Cancer Father         bladder     Aneurysm Father      Dementia Mother      No Known Problems Sister      No Known Problems Daughter      No Known Problems Maternal Grandmother      No Known Problems Maternal Grandfather      No Known Problems Paternal Grandmother      No Known Problems Paternal Grandfather      No Known Problems Maternal Aunt      No Known Problems Paternal Aunt      Hereditary Breast and Ovarian Cancer Syndrome No family hx of      Breast Cancer No family hx of      Colon Cancer No family hx of      Endometrial Cancer No family hx of      Ovarian Cancer No family hx of        Social History     Socioeconomic History     Marital status:      Spouse name: Not on file     Number of children: Not on file     Years of education: Not on file     Highest education level: Not on file   Occupational History     Not on file   Tobacco Use     Smoking status: Former Smoker     Smokeless tobacco: Never Used   Substance and Sexual Activity     Alcohol use: Yes     Alcohol/week: 2.0 standard drinks     Drug use: No     Sexual activity: Not on file   Other Topics Concern     Not on file   Social History Narrative     Not on file     Social Determinants of Health     Financial Resource Strain:      Difficulty of Paying Living Expenses:    Food Insecurity:      Worried About Running Out of Food in the  Last Year:      Ran Out of Food in the Last Year:    Transportation Needs:      Lack of Transportation (Medical):      Lack of Transportation (Non-Medical):    Physical Activity:      Days of Exercise per Week:      Minutes of Exercise per Session:    Stress:      Feeling of Stress :    Social Connections:      Frequency of Communication with Friends and Family:      Frequency of Social Gatherings with Friends and Family:      Attends Alevism Services:      Active Member of Clubs or Organizations:      Attends Club or Organization Meetings:      Marital Status:    Intimate Partner Violence:      Fear of Current or Ex-Partner:      Emotionally Abused:      Physically Abused:      Sexually Abused:        Review of Systems - 10 point Review of Systems is negative except for right heel pain which is noted in HPI.    OBJECTIVE:  Appearance: alert, well appearing, and in no distress.    General appearance: Patient is alert and fully cooperative with history & exam.  No sign of distress is noted during the visit.     Psychiatric: Affect is pleasant & appropriate.  Patient appears motivated to improve health.     Respiratory: Breathing is regular & unlabored while sitting.     HEENT: Hearing is intact to spoken word.  Speech is clear.  No gross evidence of visual impairment that would impact ambulation.    Vascular: Dorsalis pedis and posterior tibial pulses are palpable. There is pedal hair growth right.  CFT < 3 sec from anterior tibial surface to distal digits right. There is no appreciable edema noted.  Dermatologic: Turgor and texture are within normal limits. No coloration or temperature changes. No primary or secondary lesions noted.  Neurologic: All epicritic and proprioceptive sensations are grossly intact right.  Musculoskeletal: Mild pain along the plantar right heel at the insertion of the plantar fascia. Limited ankle dorsiflexion with knee extended and flexed.     Imaging:     XR Joint Injection Intermed  Left    Result Date: 9/2/2021  EXAMS: 1. LEFT FOOT JOINT INJECTION 2. FLUOROSCOPIC GUIDANCE LOCATION: [facility] DATE/TIME: 9/2/2021 1:57 PM INDICATION: Left lateral foot pain ascribed to the fourth and fifth metatarsal tarsal joint. PROCEDURE: Procedure and risks explained and consent received. The skin was prepped and draped in sterile fashion. 4 mL 1% lidocaine infused in local soft tissues. Under direct fluoroscopic guidance, a 25-gauge needle was introduced into the joint space. Position was confirmed with injection of nonionic contrast material. INJECTION: 1 mL of 40 mg per mL of Depo-Medrol.  2 mL of 0.5% bupivacaine 5 mg/mL . COMPLICATIONS: None. SPECIMEN: None. FLUOROSCOPIC TIME: 2.5 minutes. NUMBER OF IMAGES: 1.     IMPRESSION: Fluoroscopic-guided left fourth/fifth metatarsal tarsal joint injection. Reference CPT Codes: 59337, 11199

## 2021-09-28 NOTE — LETTER
9/28/2021         RE: Cali Gama  [address]  [facility] [place] [address]        Dear Colleague,    Thank you for referring your patient, Cali Gama, to the [facility]. Please see a copy of my visit note below.    FOOT AND ANKLE SURGERY/PODIATRY Progress Note        ASSESSMENT:   Plantar Fasciitis  Gastrosoleus Equinus       TREATMENT:  -Patient has pain along the plantar heel at insertion of plantar fascia consistent with plantar fasciitis. We discussed treatment options to include stretching exercises, anti-inflammatory medication, orthotics, steroid injections, physical therapy and a night splint. He would like to try a steroid injection today.    -Injected 1 cc Kenalog 40/0.5 cc 2% lidocaine plain right heel. Patient tolerated the procedure well. Band aid applied.     -He declines custom orthotics at this time, but will consider if his symptoms do not improve.     -I have asked him to follow-up with me if symptoms do not improve over the next few weeks.    Renato Cary, TE  [facility] Podiatry/Foot & Ankle Surgery      HPI: Patient returns to see me today for right heel pain which started over the past 2-3 weeks. He is known to me having recently underwent a steroid injection in the left foot with IR which as completely reduced his pain. He believes increased walking is to blame for the right heel pain.     Past Medical History:   Diagnosis Date     BPH with urinary obstruction      Chronic kidney disease      GERD (gastroesophageal reflux disease) 7/29/2015     Irritable bowel syndrome     Created by Conversion      Osteoarthrosis involving lower leg     Created by Conversion  Replacement Utility updated for latest IMO load     Stage 2 chronic kidney disease 7/29/2015     Thyroid nodule 8/15/2018    right side, 2 cm discovered on 8/15/18       Past Surgical History:   Procedure Laterality Date     CHOLECYSTECTOMY       HC TRANSURETHRAL ELEC-SURG PROSTATECTOM  N/A 9/15/2020    Procedure: CYSTOSCOPY, BIPOLAR TRANSURETHRAL RESECTION OF PROSTATE;  Surgeon: Mario Reynolds MD;  Location: [facility];  Service: Urology     JOINT REPLACEMENT         Allergies   Allergen Reactions     Ciprofloxacin Other (See Comments)     tendonitis     Morphine Unknown     Patient states that he cannot urinate after taking morphine.     Vitamin A Unknown     Paba Derivatives         Current Outpatient Medications:      famotidine (FOR PEPCID) 10 MG tablet, [FAMOTIDINE (FOR PEPCID) 10 MG TABLET] Take 10 mg by mouth every evening.  (Patient not taking: Reported on 9/28/2021), Disp: , Rfl:      triamcinolone (KENALOG) 0.1 % cream, [TRIAMCINOLONE (KENALOG) 0.1 % CREAM] APPLY TOPICALLY TO AFFECTED AREA BID PRN FOR RASH (Patient not taking: Reported on 9/28/2021), Disp: , Rfl:     Family History   Problem Relation Age of Onset     Cancer Father         bladder     Aneurysm Father      Dementia Mother      No Known Problems Sister      No Known Problems Daughter      No Known Problems Maternal Grandmother      No Known Problems Maternal Grandfather      No Known Problems Paternal Grandmother      No Known Problems Paternal Grandfather      No Known Problems Maternal Aunt      No Known Problems Paternal Aunt      Hereditary Breast and Ovarian Cancer Syndrome No family hx of      Breast Cancer No family hx of      Colon Cancer No family hx of      Endometrial Cancer No family hx of      Ovarian Cancer No family hx of        Social History     Socioeconomic History     Marital status:      Spouse name: Not on file     Number of children: Not on file     Years of education: Not on file     Highest education level: Not on file   Occupational History     Not on file   Tobacco Use     Smoking status: Former Smoker     Smokeless tobacco: Never Used   Substance and Sexual Activity     Alcohol use: Yes     Alcohol/week: 2.0 standard drinks     Drug use: No     Sexual activity: Not on file    Other Topics Concern     Not on file   Social History Narrative     Not on file     Social Determinants of Health     Financial Resource Strain:      Difficulty of Paying Living Expenses:    Food Insecurity:      Worried About Running Out of Food in the Last Year:      Ran Out of Food in the Last Year:    Transportation Needs:      Lack of Transportation (Medical):      Lack of Transportation (Non-Medical):    Physical Activity:      Days of Exercise per Week:      Minutes of Exercise per Session:    Stress:      Feeling of Stress :    Social Connections:      Frequency of Communication with Friends and Family:      Frequency of Social Gatherings with Friends and Family:      Attends Orthodox Services:      Active Member of Clubs or Organizations:      Attends Club or Organization Meetings:      Marital Status:    Intimate Partner Violence:      Fear of Current or Ex-Partner:      Emotionally Abused:      Physically Abused:      Sexually Abused:        Review of Systems - 10 point Review of Systems is negative except for right heel pain which is noted in HPI.    OBJECTIVE:  Appearance: alert, well appearing, and in no distress.    General appearance: Patient is alert and fully cooperative with history & exam.  No sign of distress is noted during the visit.     Psychiatric: Affect is pleasant & appropriate.  Patient appears motivated to improve health.     Respiratory: Breathing is regular & unlabored while sitting.     HEENT: Hearing is intact to spoken word.  Speech is clear.  No gross evidence of visual impairment that would impact ambulation.    Vascular: Dorsalis pedis and posterior tibial pulses are palpable. There is pedal hair growth right.  CFT < 3 sec from anterior tibial surface to distal digits right. There is no appreciable edema noted.  Dermatologic: Turgor and texture are within normal limits. No coloration or temperature changes. No primary or secondary lesions noted.  Neurologic: All epicritic and  proprioceptive sensations are grossly intact right.  Musculoskeletal: Mild pain along the plantar right heel at the insertion of the plantar fascia. Limited ankle dorsiflexion with knee extended and flexed.     Imaging:     XR Joint Injection Intermed Left    Result Date: 9/2/2021  EXAMS: 1. LEFT FOOT JOINT INJECTION 2. FLUOROSCOPIC GUIDANCE LOCATION: [facility] DATE/TIME: 9/2/2021 1:57 PM INDICATION: Left lateral foot pain ascribed to the fourth and fifth metatarsal tarsal joint. PROCEDURE: Procedure and risks explained and consent received. The skin was prepped and draped in sterile fashion. 4 mL 1% lidocaine infused in local soft tissues. Under direct fluoroscopic guidance, a 25-gauge needle was introduced into the joint space. Position was confirmed with injection of nonionic contrast material. INJECTION: 1 mL of 40 mg per mL of Depo-Medrol.  2 mL of 0.5% bupivacaine 5 mg/mL . COMPLICATIONS: None. SPECIMEN: None. FLUOROSCOPIC TIME: 2.5 minutes. NUMBER OF IMAGES: 1.     IMPRESSION: Fluoroscopic-guided left fourth/fifth metatarsal tarsal joint injection. Reference CPT Codes: 53577, 63826             Again, thank you for allowing me to participate in the care of your patient.        Sincerely,        Renato Cary DPM

## 2021-09-30 ENCOUNTER — OFFICE VISIT (OUTPATIENT)
Dept: FAMILY MEDICINE | Facility: CLINIC | Age: 76
End: 2021-09-30
Payer: MEDICARE

## 2021-09-30 VITALS
HEIGHT: 69 IN | WEIGHT: 186 LBS | SYSTOLIC BLOOD PRESSURE: 130 MMHG | HEART RATE: 67 BPM | DIASTOLIC BLOOD PRESSURE: 80 MMHG | BODY MASS INDEX: 27.55 KG/M2

## 2021-09-30 DIAGNOSIS — K80.50 CHOLEDOCHOLITHIASIS: ICD-10-CM

## 2021-09-30 DIAGNOSIS — R61 NIGHT SWEATS: Primary | ICD-10-CM

## 2021-09-30 DIAGNOSIS — Z98.890 S/P ERCP: ICD-10-CM

## 2021-09-30 LAB
ALBUMIN SERPL-MCNC: 4.1 G/DL (ref 3.5–5)
ALP SERPL-CCNC: 85 U/L (ref 45–120)
ALT SERPL W P-5'-P-CCNC: 17 U/L (ref 0–45)
ANION GAP SERPL CALCULATED.3IONS-SCNC: 12 MMOL/L (ref 5–18)
AST SERPL W P-5'-P-CCNC: 17 U/L (ref 0–40)
BILIRUB SERPL-MCNC: 1 MG/DL (ref 0–1)
BUN SERPL-MCNC: 14 MG/DL (ref 8–28)
C REACTIVE PROTEIN LHE: <0.1 MG/DL (ref 0–0.8)
CALCIUM SERPL-MCNC: 9.5 MG/DL (ref 8.5–10.5)
CHLORIDE BLD-SCNC: 106 MMOL/L (ref 98–107)
CO2 SERPL-SCNC: 23 MMOL/L (ref 22–31)
CREAT SERPL-MCNC: 1.22 MG/DL (ref 0.7–1.3)
ERYTHROCYTE [DISTWIDTH] IN BLOOD BY AUTOMATED COUNT: 13.3 % (ref 10–15)
ERYTHROCYTE [SEDIMENTATION RATE] IN BLOOD BY WESTERGREN METHOD: 2 MM/HR (ref 0–15)
GFR SERPL CREATININE-BSD FRML MDRD: 56 ML/MIN/1.73M2
GLUCOSE BLD-MCNC: 96 MG/DL (ref 70–125)
HCT VFR BLD AUTO: 48.3 % (ref 40–53)
HGB BLD-MCNC: 16.7 G/DL (ref 13.3–17.7)
MCH RBC QN AUTO: 31.2 PG (ref 26.5–33)
MCHC RBC AUTO-ENTMCNC: 34.6 G/DL (ref 31.5–36.5)
MCV RBC AUTO: 90 FL (ref 78–100)
PLATELET # BLD AUTO: 220 10E3/UL (ref 150–450)
POTASSIUM BLD-SCNC: 4.4 MMOL/L (ref 3.5–5)
PROT SERPL-MCNC: 6.8 G/DL (ref 6–8)
RBC # BLD AUTO: 5.36 10E6/UL (ref 4.4–5.9)
SODIUM SERPL-SCNC: 141 MMOL/L (ref 136–145)
T3 SERPL-MCNC: 66 NG/DL (ref 60–181)
T4 FREE SERPL-MCNC: 0.84 NG/DL (ref 0.7–1.8)
TSH SERPL DL<=0.005 MIU/L-ACNC: 0.79 UIU/ML (ref 0.3–5)
WBC # BLD AUTO: 7.6 10E3/UL (ref 4–11)

## 2021-09-30 PROCEDURE — 84480 ASSAY TRIIODOTHYRONINE (T3): CPT | Performed by: FAMILY MEDICINE

## 2021-09-30 PROCEDURE — 80053 COMPREHEN METABOLIC PANEL: CPT | Performed by: FAMILY MEDICINE

## 2021-09-30 PROCEDURE — 85027 COMPLETE CBC AUTOMATED: CPT | Performed by: FAMILY MEDICINE

## 2021-09-30 PROCEDURE — 85652 RBC SED RATE AUTOMATED: CPT | Performed by: FAMILY MEDICINE

## 2021-09-30 PROCEDURE — 84443 ASSAY THYROID STIM HORMONE: CPT | Performed by: FAMILY MEDICINE

## 2021-09-30 PROCEDURE — 86141 C-REACTIVE PROTEIN HS: CPT | Performed by: FAMILY MEDICINE

## 2021-09-30 PROCEDURE — 36415 COLL VENOUS BLD VENIPUNCTURE: CPT | Performed by: FAMILY MEDICINE

## 2021-09-30 PROCEDURE — 84439 ASSAY OF FREE THYROXINE: CPT | Performed by: FAMILY MEDICINE

## 2021-09-30 PROCEDURE — 99214 OFFICE O/P EST MOD 30 MIN: CPT | Performed by: FAMILY MEDICINE

## 2021-09-30 ASSESSMENT — MIFFLIN-ST. JEOR: SCORE: 1554.07

## 2021-09-30 NOTE — PROGRESS NOTES
Assessment/Plan:    Night sweats  We will update inflammatory markers etc. as noted.  Check TSH including free T4 and total T3.  Patient will follow up with Dr. Hernandez infectious disease prior to returning to [place] October 16, 2021 for 5 months.  - CBC with platelets  - CRP inflammation  - Erythrocyte sedimentation rate auto  - Comprehensive metabolic panel  - TSH  - T4, free  - T3, total    Choledocholithiasis  Choledocholithiasis history as noted.    S/P ERCP  Status post ERCP August 10, 2021 with biliary stent removal noted.    30 minutes total time with patient, > 50% with chart review, counseling and coordination of cares, and documentation.         Subjective:    Cali Gama is seen today for follow-up assessment.  Complicated history.  Describes night sweats.  Perhaps 1 or 2 times every week or 2.  Seems to be controllable now more with the environment as long as he keeps his temperature at 73 degrees and a window open otherwise night sweats of temperature warmer.  Had originally been hospitalized March 19, 2021 through March 24, 2021 for large bile duct stone while in [facility].  ERCP with apparent intestinal perforation complication.  Stent had been placed.  Question of a small soft tissue density anterior right kidney consistent with potential abscess versus phlegmon.  Subsequent following of CT showing resolution of this however night sweats continue.  Had seen Dr. Hernandez infectious disease and subsequently sent as well to Dr. He gastroenterology.  Repeat ERCP through [facility] August 10, 2021 with removal of stent.  Also choledocholithiasis with obstruction was present and balloon sweep removing residual stones with clearing of duct noted.  Was told to follow-up with PCP for further evaluation.  Constipation treated with MiraLAX and mineral oil without diarrhea.  No chills.  No cough or shortness of breath.  No urinary symptoms including dysuria urgency or frequency.  No  history of prostatitis.  Comprehensive review of systems as above otherwise all negative.      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 Navarro[facility]   Lives in [place] x 6 months of the year   Hobbies: writing and weight lifting    Past Surgical History:   Procedure Laterality Date     CHOLECYSTECTOMY       HC TRANSURETHRAL ELEC-SURG PROSTATECTOM N/A 9/15/2020    Procedure: CYSTOSCOPY, BIPOLAR TRANSURETHRAL RESECTION OF PROSTATE;  Surgeon: Mario Reynolds MD;  [place] [facility] [place]     JOINT REPLACEMENT          Family History   Problem Relation Age of Onset     Cancer Father         bladder     Aneurysm Father      Dementia Mother      No Known Problems Sister      No Known Problems Daughter      No Known Problems Maternal Grandmother      No Known Problems Maternal Grandfather      No Known Problems Paternal Grandmother      No Known Problems Paternal Grandfather      No Known Problems Maternal Aunt      No Known Problems Paternal Aunt      Hereditary Breast and Ovarian Cancer Syndrome No family hx of      Breast Cancer No family hx of      Colon Cancer No family hx of      Endometrial Cancer No family hx of      Ovarian Cancer No family hx of         Past Medical History:   Diagnosis Date     BPH with urinary obstruction      Chronic kidney disease      GERD (gastroesophageal reflux disease)  "7/29/2015     Irritable bowel syndrome     Created by Conversion      Osteoarthrosis involving lower leg     Created by Conversion  Replacement Utility updated for latest IMO load     Stage 2 chronic kidney disease 7/29/2015     Thyroid nodule 8/15/2018    right side, 2 cm discovered on 8/15/18        Social History     Tobacco Use     Smoking status: Former Smoker     Smokeless tobacco: Never Used   Substance Use Topics     Alcohol use: Yes     Alcohol/week: 2.0 standard drinks     Drug use: No        Current Outpatient Medications   Medication Sig Dispense Refill     famotidine (FOR PEPCID) 10 MG tablet Take 10 mg by mouth every evening        triamcinolone (KENALOG) 0.1 % cream [TRIAMCINOLONE (KENALOG) 0.1 % CREAM] APPLY TOPICALLY TO AFFECTED AREA BID PRN FOR RASH            Objective:    Vitals:    09/30/21 1102   BP: 130/80   Pulse: 67   Weight: 84.4 kg (186 lb)   Height: 1.753 m (5' 9\")      Body mass index is 27.47 kg/m .    Alert.  No apparent distress.  Chest clear.  Cardiac exam regular.  Extremities warm and dry.        8/10/21  Procedure:                    ERCP   Proceduralist:                Mc He MD - [place]                                 Gastroenterology PA   Referring MD:                 Urban Barker MD   Indications/Pre-Op Diagnosis: Biliary stent removal. Placed in [place] for                                 sphincterotomy perforation.   Medications:                  Monitored Anesthesia Care     Procedure Description:        The patient had risks, benefits and alternatives explained to and gave        informed consent. The patient had a stable cardiopulmonary status and        judged an adequate candidate for conscious sedation.        The duodenoscope 228 was passed through the mouth, and advanced to the        duodenum and used to inject contrast into the bile duct. The ERCP was        accomplished without difficulty. The patient tolerated the procedure        well. "     Complications:                No immediate complications.   Estimated Blood Loss & Specimen:        Estimated blood loss: none.        Specimen collected: None     Findings:        A biliary stent was visible on the  film. The stent was found        emerging from the papilla. This was removed with a grasper. The bile        duct was cannulated with a retrieval balloon and a wire. The initial        cholangiogram revealed a 12mm bile duct with multiple filling defects.        Balloon sweeps produced retained stones. The occlusion cholangiogram        after removal was clear. No other abnormalities were found.     Impressions/Post-Op Diagnosis:        - Biliary stent removal.        - Choledocholithiasis with an obstruction was found. Complete removal        was accomplished via an existing biliary sphincterotomy and balloon        extraction.     Recommendation:        - Discharge patient to home.        - Return to GI clinic PRN.     Moderate Sedation:        Moderate (conscious) sedation was personally administered by an        anesthesia professional. The following parameters were monitored: oxygen        saturation, heart rate, blood pressure, respiratory rate, EKG, adequacy        of pulmonary ventilation, and response to care. Total physician        intraservice time was 20 minutes.       _________________________   Mc He MD   8/10/2021 1:23:12 PM     Note Initiated On: 8/10/2021 11:01 AM   Total Procedure Duration Time 0 hours 9 minutes 14 seconds           EXAM: XR ERCP BILIARY ONLY   LOCATION: [facility] MEDICAL IMAGING   DATE/TIME: 8/10/2021 1:18 PM     INDICATION: Stones. Biliary stent removal.   COMPARISON: None.   TECHNIQUE: Exam performed by gastroenterologist.     FLUOROSCOPIC TIME: 2 min     NUMBER OF IMAGES: 2     FINDINGS:   BILE DUCTS: Mild dilatation. Distal filling defects suggesting stones. Cholecystectomy clips.   PANCREATIC DUCT: Not imaged.     Please see procedural report  for further details.          EXAM: CT ABDOMEN PELVIS W ORAL W IV CONTRAST  LOCATION: [facility]  DATE/TIME: 6/22/2021 6:21 PM     INDICATION: Abdominal infection suspected  COMPARISON: 08/23/2020  TECHNIQUE: CT scan of the abdomen and pelvis was performed following injection of IV contrast. Multiplanar reformats were obtained. Dose reduction techniques were used.  CONTRAST: Iopamidol (Isovue-370) 75mL     FINDINGS:   LOWER CHEST: Normal.     HEPATOBILIARY: Absent gallbladder. Extrahepatic biliary stent appears in good position. Intrabiliary air. Normal-appearing liver.     PANCREAS: Normal.     SPLEEN: Normal.     ADRENAL GLANDS: Normal.     KIDNEYS/BLADDER: Right upper renal 1 and 2 mm, and approximately 8 left renal 1 to 3 mm nonobstructive stones. Right renal cyst. No further imaging of this recommended. No hydronephrosis.     BOWEL: Left colonic diverticulosis. Small bowel and appendix appear normal.     LYMPH NODES: Normal.     VASCULATURE: Unremarkable.     PELVIC ORGANS: Mild prostate enlargement.     MUSCULOSKELETAL: Multilevel thoracal lumbar disc degeneration.     IMPRESSION:  1.  No abdominal infection seen.  2.  Biliary stent.  3.  Bilateral renal nonobstructive stones.  4.  Enlarged prostate.      This note has been dictated using voice recognition software and as a result may contain minor grammatical errors and unintended word substitutions.

## 2021-09-30 NOTE — LETTER
"October 1, 2021      Cali Gmaa  [address]  [place] [address]        Dear ,    We are writing to inform you of your test results.    Great news!  Your lab results do not show any significant concerns.    Your complete blood count results were normal.  No evidence for anemia, etc.     Your \"inflammation test\" (i.e. CRP) was normal.     Your \"inflammation test\" (i.e. Sed Rate) was normal.      Your kidney and liver tests were normal.     Your thyroid screening tests (i.e. TSH, free T4, total T3) were normal.  No evidence for hyperthyroidism.    Resulted Orders   CBC with platelets   Result Value Ref Range    WBC Count 7.6 4.0 - 11.0 10e3/uL    RBC Count 5.36 4.40 - 5.90 10e6/uL    Hemoglobin 16.7 13.3 - 17.7 g/dL    Hematocrit 48.3 40.0 - 53.0 %    MCV 90 78 - 100 fL    MCH 31.2 26.5 - 33.0 pg    MCHC 34.6 31.5 - 36.5 g/dL    RDW 13.3 10.0 - 15.0 %    Platelet Count 220 150 - 450 10e3/uL   CRP inflammation   Result Value Ref Range    CRP <0.1 0.0-<0.8 mg/dL   Erythrocyte sedimentation rate auto   Result Value Ref Range    Erythrocyte Sedimentation Rate 2 0 - 15 mm/hr   Comprehensive metabolic panel   Result Value Ref Range    Sodium 141 136 - 145 mmol/L    Potassium 4.4 3.5 - 5.0 mmol/L    Chloride 106 98 - 107 mmol/L    Carbon Dioxide (CO2) 23 22 - 31 mmol/L    Anion Gap 12 5 - 18 mmol/L    Urea Nitrogen 14 8 - 28 mg/dL    Creatinine 1.22 0.70 - 1.30 mg/dL    Calcium 9.5 8.5 - 10.5 mg/dL    Glucose 96 70 - 125 mg/dL    Alkaline Phosphatase 85 45 - 120 U/L    AST 17 0 - 40 U/L    ALT 17 0 - 45 U/L    Protein Total 6.8 6.0 - 8.0 g/dL    Albumin 4.1 3.5 - 5.0 g/dL    Bilirubin Total 1.0 0.0 - 1.0 mg/dL    GFR Estimate 56 (L) >60 mL/min/1.73m2      Comment:      As of July 11, 2021, eGFR is calculated by the CKD-EPI creatinine equation, without race adjustment. eGFR can be influenced by muscle mass, exercise, and diet. The reported eGFR is an estimation only and is only applicable if the renal " function is stable.   TSH   Result Value Ref Range    TSH 0.79 0.30 - 5.00 uIU/mL   T4, free   Result Value Ref Range    Free T4 0.84 0.70 - 1.80 ng/dL      Comment:      Performance of the Free T4 test has not been established with  specimens (<= 2 months of age).     T3, total   Result Value Ref Range    T3 Total 66 60 - 181 ng/dL       If you have any questions or concerns, please call the clinic at the number listed above.       Sincerely,      Urban Barker MD

## 2021-10-01 ENCOUNTER — TELEPHONE (OUTPATIENT)
Dept: INFECTIOUS DISEASES | Facility: CLINIC | Age: 76
End: 2021-10-01

## 2021-10-01 NOTE — TELEPHONE ENCOUNTER
Pt was to f/u with Dr Hernandez in 6 weeks from his June visit. Please call the pt to schedule first available.

## 2021-10-01 NOTE — TELEPHONE ENCOUNTER
Patient saw his PCP yesterday, and he would like for him to connect with Dr. Hernandez, to possibly schedule an appointment for additional blood work, or to see him in the office.  He will be leaving for [place] on 10.15.2021 for the next 5 months, so all appointments will need to be before then.    Please have Dr. Hernandez review Dr. Barker's note and advise on what to do.    Cali @ [phone] or

## 2021-10-01 NOTE — TELEPHONE ENCOUNTER
Date: 10/7/2021 Status: Scheduled   Time: 9:20 AM Length: 20   Visit Type: RETURN [657] Copay: $0.00   Provider: Heath Hernandez MD

## 2021-10-05 ENCOUNTER — TRANSFERRED RECORDS (OUTPATIENT)
Dept: HEALTH INFORMATION MANAGEMENT | Facility: CLINIC | Age: 76
End: 2021-10-05

## 2021-10-07 ENCOUNTER — LAB (OUTPATIENT)
Dept: LAB | Facility: CLINIC | Age: 76
End: 2021-10-07

## 2021-10-07 ENCOUNTER — OFFICE VISIT (OUTPATIENT)
Dept: INFECTIOUS DISEASES | Facility: CLINIC | Age: 76
End: 2021-10-07
Payer: MEDICARE

## 2021-10-07 VITALS
HEART RATE: 76 BPM | DIASTOLIC BLOOD PRESSURE: 82 MMHG | WEIGHT: 183 LBS | SYSTOLIC BLOOD PRESSURE: 160 MMHG | BODY MASS INDEX: 27.02 KG/M2 | TEMPERATURE: 98.5 F

## 2021-10-07 DIAGNOSIS — R93.5 ABNORMAL FINDINGS ON DIAGNOSTIC IMAGING OF OTHER ABDOMINAL REGIONS, INCLUDING RETROPERITONEUM: ICD-10-CM

## 2021-10-07 DIAGNOSIS — E16.2 HYPOGLYCEMIA: ICD-10-CM

## 2021-10-07 DIAGNOSIS — R50.9 FEVER OF UNKNOWN ORIGIN: ICD-10-CM

## 2021-10-07 DIAGNOSIS — Z13.1 DIABETES MELLITUS SCREENING: ICD-10-CM

## 2021-10-07 DIAGNOSIS — Z91.89 DIABETES MELLITUS HIGH RISK: ICD-10-CM

## 2021-10-07 DIAGNOSIS — R50.9 FEVER OF UNKNOWN ORIGIN: Primary | ICD-10-CM

## 2021-10-07 LAB
ALBUMIN SERPL-MCNC: 4.2 G/DL (ref 3.5–5)
ALP SERPL-CCNC: 93 U/L (ref 45–120)
ALT SERPL W P-5'-P-CCNC: 18 U/L (ref 0–45)
AMYLASE SERPL-CCNC: 59 U/L (ref 5–120)
AST SERPL W P-5'-P-CCNC: 20 U/L (ref 0–40)
BILIRUB DIRECT SERPL-MCNC: 0.3 MG/DL
BILIRUB SERPL-MCNC: 1.3 MG/DL (ref 0–1)
HBA1C MFR BLD: 5.3 % (ref 0–5.6)
PROT SERPL-MCNC: 7.1 G/DL (ref 6–8)

## 2021-10-07 PROCEDURE — 36415 COLL VENOUS BLD VENIPUNCTURE: CPT

## 2021-10-07 PROCEDURE — 82150 ASSAY OF AMYLASE: CPT

## 2021-10-07 PROCEDURE — 80076 HEPATIC FUNCTION PANEL: CPT

## 2021-10-07 PROCEDURE — 99215 OFFICE O/P EST HI 40 MIN: CPT | Performed by: INTERNAL MEDICINE

## 2021-10-07 PROCEDURE — 83036 HEMOGLOBIN GLYCOSYLATED A1C: CPT

## 2021-10-07 NOTE — PROGRESS NOTES
[facility] INFECTIOUS DISEASE CLINIC [facility]    Date: 6/24/2021  Patient Name: Cali Gama   YOB: 1945  MRN: [id number]      ASSESSMENT:  76-year-old man without significant past medical history who is being followed for ongoing night sweats.    1. Intestinal perforation following ERCP.  Treated in [place] with IV antibiotics, then discharged on oral antibiotics x3 weeks (possibly Augmentin, cannot confirm).  Known soft tissue density around the right kidney thought to be abscess or phlegmon.  Serial imaging shows this is decreasing in size. Repeat CT abdomen shows resolution of kidney lesion  2. Choledocholithiasis.  Underwent biliary stone removal in March 2021 in [place].  Complicated by intestinal perforation.  Had a second ERCP with stent placement. Followed up with [facility] this summer and had his biliary stent removed on 8/10. Obstruction noted with removal of biliary stones.  3. Night sweats.  Presumably related to his recent infection, possibly from persistent abscess/phlegmon.  Interestingly, labs are relatively benign with normal white count and CRP.  As noted above, CT shows no occult infection.  Symptoms did not improve with empiric course of antibiotics. Symptoms seemed to increase following ERCP in August.  Recent labs were reviewed showing normal white count, CRP, and sed rate.  4. Allergy to ciprofloxacin with tendinitis    PLAN:  -We will check hepatic panel, amylase, and hemoglobin A1c  -Repeat CT scan of the abdomen with contrast  -If no results found, recommend getting PET CT scan    I will call patient after we review his imaging studies.  Patient may need to be referred to [facility] ([facility]?)  If we are unable to find the cause of his symptoms.    Heath Hernandez MD  [facility]   Clinic phone: [phone]   [facility] fax: [phone]     ADDENDUM: I reviewed CT scan results with the radiologist.  There is mild enhancement at the distal common  bile duct.  LFTs are still pending, but the patient did not have any symptoms of cholangitis on exam today.  We also reviewed ERCP findings with Dr. He.  Overall, there was nothing remarkable about his ERCP to explain his ongoing symptoms.  ______________________________________________________________________    HISTORY OF PRESENT ILLNESS:     From initial visit on 06/03/2021:    Cali Gama is a 76 y.o. man who is referred for evaluation of night sweats.  The patient has been having regular night sweats for the past 3 months.  He has a history of cholecystectomy in 2003.  Subsequently, he had a biliary stent placed for some right upper quadrant discomfort that he was having.  He never followed up for this, and presumed that the stent was permanent.  Around December 2020 he began having right upper quadrant pain especially with bending over.  He followed up with a GI doctor in [place] who recommended ERCP for stent removal.  He was found to have a large bile duct stone.  Following the ERCP, he had a perforation requiring hospitalization.  He was hospitalized from 3/19-3/24 in [place].  He tells me that the next day he had a second ERCP with placement of a metal stent.  Per records, the indication was for a small retroperitoneal air leak.  He was treated with IV antibiotics, then discharged on an oral antibiotic for 3 weeks.  (He describes this as 1 pill twice a day, large white pill, he does not remember the name).  CT scan of the abdomen on 3/30 shows a right perirenal fluid collection 3 x 2 x 1 cm.  The CT was repeated on 4/12 which showed decreasing fluid collection of 0.8 x 2.5 cm.  A third scan was done on 5/10 which describes small enhancing lesion anterior to the right kidney that is decreasing in size.    The patient tells me that he has been having night sweats since March.  He continues to feel fullness in his right upper quadrant when he bends over.  He is having some constipation, otherwise  has no acute complaints.  He denies any dysuria.  He has not recorded any fevers at home.  He does not have any recent Covid exposures.  He was vaccinated back in February.  He denies any cough or worsening shortness of breath.  He lives in [place], but spends the sandoval in [place].  He grew up in [place].  No known TB exposures.      Interval History:  Patient is here for follow-up.  He was last seen in June.  At that time his symptoms of night sweats were actually improving.  He followed up with [place] gastroenterology and had an ERCP on 8/10/2021.  He underwent biliary stent removal.  Choledocholithiasis with an obstruction was found and these were removed with balloon extraction.  Biliary sphincterotomy was already present.    Unfortunately, after his procedure he feels like his night sweats have been ramping up.  Frequency has increased.  He is trying to associate it with different things such as eating, drinking wine, or positioning in bed.  He says that he will wake up about 2 hours after going to sleep with sweats.  He has not recorded any fevers lately.  He denies abdominal pain.  He does not think that the symptoms happen after meals.  Actually, he will eat a moderate breakfast and lunch, with a snack in the afternoon, then rarely get anything else before bed.  He drinks wine in moderation, maybe 2 drinks at a time, but has been drinking less to see if that affects his night sweats.  After he was sick in the hospital last spring he did lose about 20 pounds.  He has been stable at 185 pounds over the past few months.  He denies any chest pain or shortness of breath.  No leg pain with exercise.  He does have constipation, and uses MiraLAX and mineral oil regularly.      Previously, we've made multiple attempts to get hospital records (discharge summary, culture results, etc), from [facility] in [place], but we have not been successful.    Review of Systems:  No new rashes, no nausea or  vomiting      PHYSICAL EXAM:    Vitals:    06/24/21 0855   BP: 122/82   Pulse: 76   Temp: 98  F (36.7  C)       GENERAL:  well-developed, well-nourished, in no acute distress.   HENT:  Head is normocephalic, atraumatic.   EYES:  Eyes have anicteric sclerae without conjunctival injection   ABDOMEN:  Normal bowel sounds, soft, nontender. No hepatosplenomegaly.  MUSCULOSKELETAL: Extremities warm and without edema. No joint swelling.  SKIN:  No acute rashes.   NEUROLOGIC: Grossly nonfocal. Normal gait and station        Pertinent labs:         CRP   Date Value Ref Range Status   09/30/2021 <0.1 0.0-<0.8 mg/dL Final   06/03/2021 0.7 0.0 - 0.8 mg/dL Final   05/25/2021 0.5 0.0 - 0.8 mg/dL Final     Lab Results   Component Value Date    WBC 7.6 09/30/2021    HGB 16.7 09/30/2021    HCT 48.3 09/30/2021     09/30/2021     09/30/2021    POTASSIUM 4.4 09/30/2021    CHLORIDE 106 09/30/2021    CO2 23 09/30/2021    BUN 14 09/30/2021    CR 1.22 09/30/2021    GLC 96 09/30/2021    SED 2 09/30/2021    AST 17 09/30/2021    ALT 17 09/30/2021    ALKPHOS 85 09/30/2021    BILITOTAL 1.0 09/30/2021                      MICROBIOLOGY DATA:  None     RADIOLOGY:  EXAM: CT ABDOMEN PELVIS W ORAL W IV CONTRAST  LOC[facility]  DATE/TIME: 6/22/2021 6:21 PM     INDICATION: Abdominal infection suspected  COMPARISON: 08/23/2020  TECHNIQUE: CT scan of the abdomen and pelvis was performed following injection of IV contrast. Multiplanar reformats were obtained. Dose reduction techniques were used.  CONTRAST: Iopamidol (Isovue-370) 75mL     FINDINGS:   LOWER CHEST: Normal.     HEPATOBILIARY: Absent gallbladder. Extrahepatic biliary stent appears in good position. Intrabiliary air. Normal-appearing liver.     PANCREAS: Normal.     SPLEEN: Normal.     ADRENAL GLANDS: Normal.     KIDNEYS/BLADDER: Right upper renal 1 and 2 mm, and approximately 8 left renal 1 to 3 mm nonobstructive stones. Right renal cyst. No  further imaging of this recommended. No hydronephrosis.     BOWEL: Left colonic diverticulosis. Small bowel and appendix appear normal.     LYMPH NODES: Normal.     VASCULATURE: Unremarkable.     PELVIC ORGANS: Mild prostate enlargement.     MUSCULOSKELETAL: Multilevel thoracal lumbar disc degeneration.     IMPRESSION:   1.  No abdominal infection seen.  2.  Biliary stent.  3.  Bilateral renal nonobstructive stones.  4.  Enlarged prostate.      Attestation:  Total time preparing to see this patient, face-to-face time, and coordinating care time on the same calendar date: 60 minutes.  Face-face time: 30 minutes.  Over 50% of face-to-face time was spent in counseling/coordination of care.

## 2021-10-14 ENCOUNTER — TELEPHONE (OUTPATIENT)
Dept: INFECTIOUS DISEASES | Facility: CLINIC | Age: 76
End: 2021-10-14

## 2021-10-14 ENCOUNTER — HOSPITAL ENCOUNTER (OUTPATIENT)
Dept: PET IMAGING | Facility: CLINIC | Age: 76
Discharge: HOME | End: 2021-10-14
Attending: INTERNAL MEDICINE | Admitting: INTERNAL MEDICINE
Payer: MEDICARE

## 2021-10-14 DIAGNOSIS — R61 NIGHT SWEATS: ICD-10-CM

## 2021-10-14 DIAGNOSIS — R50.9 FEVER OF UNKNOWN ORIGIN: ICD-10-CM

## 2021-10-14 PROCEDURE — G1004 CDSM NDSC: HCPCS | Mod: GC | Performed by: STUDENT IN AN ORGANIZED HEALTH CARE EDUCATION/TRAINING PROGRAM

## 2021-10-14 PROCEDURE — A9552 F18 FDG: HCPCS | Performed by: INTERNAL MEDICINE

## 2021-10-14 PROCEDURE — 78815 PET IMAGE W/CT SKULL-THIGH: CPT | Mod: MG,PI

## 2021-10-14 PROCEDURE — 78815 PET IMAGE W/CT SKULL-THIGH: CPT | Mod: 26 | Performed by: STUDENT IN AN ORGANIZED HEALTH CARE EDUCATION/TRAINING PROGRAM

## 2021-10-14 PROCEDURE — 343N000001 HC RX 343: Performed by: INTERNAL MEDICINE

## 2021-10-14 RX ADMIN — FLUDEOXYGLUCOSE F-18 13.73 MCI.: 500 INJECTION, SOLUTION INTRAVENOUS at 08:02

## 2021-10-14 NOTE — TELEPHONE ENCOUNTER
Dr Hernandez & Team    Pt is getting PET SCAN this Morning, 10/14.    They are leaving out of town saturday 10/16. And depending on the results, they may have to cancel their flights.    Please call patient back with results ASAP @ , Mira, Spouse.

## 2021-10-15 NOTE — TELEPHONE ENCOUNTER
Normal PET scan. I called patient to review results and gave reassurance. No additional work-up at this time. He is considering second opinion at [place] if symptoms persists, but will wait to see if symptoms get better.

## 2021-12-14 NOTE — TELEPHONE ENCOUNTER
I called the pt, the pt states that he took two cefdinir, one in the afternoon and one in the evening. The pt states that almost immediately after taking he first cefdinir he has an episode of diarrhea. The pt states that he only took one metronidazole in the evening. The pt states that he was up all night with watery diarrhea, 4 episodes. The pt also c/o worse night sweats then usual. The pt reports he was nauseous as well. The pt is doing better this morning, however has no appetite. He has not had any BM's this a.m. The pt has discontinued both cefdinir and metronidazole. I informed I will discuss this with the MD and contact him back.    Detail Level: Simple Render Risk Assessment In Note?: no Additional Notes: Patient has failed oral medications, PRP therapy for Alopecia has been ordered.

## 2022-04-13 ENCOUNTER — TRANSFERRED RECORDS (OUTPATIENT)
Dept: HEALTH INFORMATION MANAGEMENT | Facility: CLINIC | Age: 77
End: 2022-04-13
Payer: MEDICARE

## 2022-05-19 ENCOUNTER — TRANSFERRED RECORDS (OUTPATIENT)
Dept: HEALTH INFORMATION MANAGEMENT | Facility: CLINIC | Age: 77
End: 2022-05-19
Payer: MEDICARE

## 2022-05-20 ENCOUNTER — LAB (OUTPATIENT)
Dept: LAB | Facility: CLINIC | Age: 77
End: 2022-05-20

## 2022-05-20 ENCOUNTER — OFFICE VISIT (OUTPATIENT)
Dept: FAMILY MEDICINE | Facility: CLINIC | Age: 77
End: 2022-05-20
Payer: MEDICARE

## 2022-05-20 VITALS
OXYGEN SATURATION: 95 % | DIASTOLIC BLOOD PRESSURE: 82 MMHG | SYSTOLIC BLOOD PRESSURE: 130 MMHG | HEART RATE: 70 BPM | HEIGHT: 69 IN | BODY MASS INDEX: 27.33 KG/M2 | WEIGHT: 184.56 LBS

## 2022-05-20 DIAGNOSIS — K21.9 GASTROESOPHAGEAL REFLUX DISEASE, UNSPECIFIED WHETHER ESOPHAGITIS PRESENT: ICD-10-CM

## 2022-05-20 DIAGNOSIS — Z20.822 ENCOUNTER FOR LABORATORY TESTING FOR COVID-19 VIRUS: ICD-10-CM

## 2022-05-20 DIAGNOSIS — N18.30 STAGE 3 CHRONIC KIDNEY DISEASE, UNSPECIFIED WHETHER STAGE 3A OR 3B CKD (H): ICD-10-CM

## 2022-05-20 DIAGNOSIS — N40.1 BENIGN PROSTATIC HYPERPLASIA WITH LOWER URINARY TRACT SYMPTOMS, SYMPTOM DETAILS UNSPECIFIED: ICD-10-CM

## 2022-05-20 DIAGNOSIS — S46.011S TRAUMATIC TEAR OF RIGHT ROTATOR CUFF, UNSPECIFIED TEAR EXTENT, SEQUELA: ICD-10-CM

## 2022-05-20 DIAGNOSIS — Z01.818 PREOP EXAMINATION: Primary | ICD-10-CM

## 2022-05-20 PROBLEM — N39.0 UTI (URINARY TRACT INFECTION): Status: RESOLVED | Noted: 2020-08-23 | Resolved: 2022-05-20

## 2022-05-20 LAB
ANION GAP SERPL CALCULATED.3IONS-SCNC: 9 MMOL/L (ref 5–18)
BUN SERPL-MCNC: 14 MG/DL (ref 8–28)
CALCIUM SERPL-MCNC: 9.4 MG/DL (ref 8.5–10.5)
CHLORIDE BLD-SCNC: 107 MMOL/L (ref 98–107)
CO2 SERPL-SCNC: 27 MMOL/L (ref 22–31)
CREAT SERPL-MCNC: 1.3 MG/DL (ref 0.7–1.3)
GFR SERPL CREATININE-BSD FRML MDRD: 56 ML/MIN/1.73M2
GLUCOSE BLD-MCNC: 93 MG/DL (ref 70–125)
HGB BLD-MCNC: 17.4 G/DL (ref 13.3–17.7)
POTASSIUM BLD-SCNC: 4.4 MMOL/L (ref 3.5–5)
SODIUM SERPL-SCNC: 143 MMOL/L (ref 136–145)

## 2022-05-20 PROCEDURE — 36415 COLL VENOUS BLD VENIPUNCTURE: CPT | Performed by: FAMILY MEDICINE

## 2022-05-20 PROCEDURE — U0005 INFEC AGEN DETEC AMPLI PROBE: HCPCS

## 2022-05-20 PROCEDURE — 80048 BASIC METABOLIC PNL TOTAL CA: CPT | Performed by: FAMILY MEDICINE

## 2022-05-20 PROCEDURE — 99214 OFFICE O/P EST MOD 30 MIN: CPT | Performed by: FAMILY MEDICINE

## 2022-05-20 PROCEDURE — U0003 INFECTIOUS AGENT DETECTION BY NUCLEIC ACID (DNA OR RNA); SEVERE ACUTE RESPIRATORY SYNDROME CORONAVIRUS 2 (SARS-COV-2) (CORONAVIRUS DISEASE [COVID-19]), AMPLIFIED PROBE TECHNIQUE, MAKING USE OF HIGH THROUGHPUT TECHNOLOGIES AS DESCRIBED BY CMS-2020-01-R: HCPCS

## 2022-05-20 PROCEDURE — 93010 ELECTROCARDIOGRAM REPORT: CPT | Mod: OFF | Performed by: INTERNAL MEDICINE

## 2022-05-20 PROCEDURE — 85018 HEMOGLOBIN: CPT | Performed by: FAMILY MEDICINE

## 2022-05-20 PROCEDURE — 93005 ELECTROCARDIOGRAM TRACING: CPT | Performed by: FAMILY MEDICINE

## 2022-05-20 RX ORDER — FLUOCINONIDE TOPICAL SOLUTION USP, 0.05% 0.5 MG/ML
SOLUTION TOPICAL
COMMUNITY
Start: 2022-03-07

## 2022-05-20 RX ORDER — CLINDAMYCIN PHOSPHATE 11.9 MG/ML
SOLUTION TOPICAL
COMMUNITY
Start: 2022-05-09 | End: 2023-04-25

## 2022-05-20 RX ORDER — KETOCONAZOLE 20 MG/ML
SHAMPOO TOPICAL
COMMUNITY
Start: 2021-12-07

## 2022-05-20 NOTE — PROGRESS NOTES
[facility]  [address] [facility] [address]  Phone: [phone]  Fax: [phone]  Primary Provider: Urban Barker  Pre-op Performing Provider: KRISTINA DOSS      PREOPERATIVE EVALUATION:  Today's date: 5/20/2022    Cali Gama is a 77 year old male who presents for a preoperative evaluation.    Surgical Information:  Surgery/Procedure: Right rotator cuff repair  Surgery Location: [facility]  Surgeon: Dr.Robert Eddy  Surgery Date: 05/23/22  Time of Surgery: unknown at this time  Where patient plans to recover: At home with family  Fax number for surgical facility: [phone]    Type of Anesthesia Anticipated: Choice    Assessment & Plan     The proposed surgical procedure is considered INTERMEDIATE risk.    Preop examination    - Hemoglobin; Future  - Basic metabolic panel; Future  - EKG 12-lead, tracing only  - Hemoglobin  - Basic metabolic panel    Traumatic tear of right rotator cuff, unspecified tear extent, sequela      Gastroesophageal reflux disease, unspecified whether esophagitis present      Benign prostatic hyperplasia with lower urinary tract symptoms, symptom details unspecified      Stage 3 chronic kidney disease, unspecified whether stage 3a or 3b CKD (H)            Risks and Recommendations:  The patient has the following additional risks and recommendations for perioperative complications:   - No identified additional risk factors other than previously addressed    Medication Instructions:  Patient is on no chronic medications    RECOMMENDATION:  APPROVAL GIVEN to proceed with proposed procedure, without further diagnostic evaluation.    Review of external notes as documented above   Review of the result(s) of each unique test - labs, EKG                  Subjective     HPI related to upcoming procedure: Patient here today for preoperative consultation.  He is having shoulder repair surgery done just a few days from now.  He  has no contraindications to having the procedure done.  He has had this level of anesthesia done in the past and is done very well with it.      Preop Questions 8/4/2021   1. Have you ever had a heart attack or stroke? No   2. Have you ever had surgery on your heart or blood vessels, such as a stent placement, a coronary artery bypass, or surgery on an artery in your head, neck, heart, or legs? No   3. Do you have chest pain with activity? No   4. Do you have a history of  heart failure? No   5. Do you currently have a cold, bronchitis or symptoms of other infection? No   6. Do you have a cough, shortness of breath, or wheezing? No   7. Do you or anyone in your family have previous history of blood clots? No   8. Do you or does anyone in your family have a serious bleeding problem such as prolonged bleeding following surgeries or cuts? No   9. Have you ever had problems with anemia or been told to take iron pills? No   10. Have you had any abnormal blood loss such as black, tarry or bloody stools? No   11. Have you ever had a blood transfusion? No   12. Are you willing to have a blood transfusion if it is medically needed before, during, or after your surgery? Yes   13. Have you or any of your relatives ever had problems with anesthesia? No   14. Do you have sleep apnea, excessive snoring or daytime drowsiness? No   15. Do you have any artifical heart valves or other implanted medical devices like a pacemaker, defibrillator, or continuous glucose monitor? No   16. Do you have artificial joints? YES -    17. Are you allergic to latex? No     Health Care Directive:  Patient does not have a Health Care Directive or Living Will: Discussed advance care planning with patient; however, patient declined at this time.    Preoperative Review of :   reviewed - no record of controlled substances prescribed.      Status of Chronic Conditions:  See problem list for active medical problems.  Problems all longstanding and  stable, except as noted/documented.  See ROS for pertinent symptoms related to these conditions.      Review of Systems  CONSTITUTIONAL: NEGATIVE for fever, chills, change in weight  INTEGUMENTARY/SKIN: NEGATIVE for worrisome rashes, moles or lesions  EYES: NEGATIVE for vision changes or irritation  ENT/MOUTH: NEGATIVE for ear, mouth and throat problems  RESP: NEGATIVE for significant cough or SOB  CV: NEGATIVE for chest pain, palpitations or peripheral edema  GI: NEGATIVE for nausea, abdominal pain, heartburn, or change in bowel habits  : NEGATIVE for frequency, dysuria, or hematuria  MUSCULOSKELETAL: NEGATIVE for significant arthralgias or myalgia  NEURO: NEGATIVE for weakness, dizziness or paresthesias  ENDOCRINE: NEGATIVE for temperature intolerance, skin/hair changes  HEME: NEGATIVE for bleeding problems  PSYCHIATRIC: NEGATIVE for changes in mood or affect    Patient Active Problem List    Diagnosis Date Noted     Equinus contracture of ankle 09/28/2021     Priority: Medium     Plantar fasciitis of right foot 09/28/2021     Priority: Medium     Chronic kidney disease, stage 3 (H) 05/25/2021     Priority: Medium     BPH with urinary obstruction      Priority: Medium     Created by Conversion         Benign prostatic hyperplasia with lower urinary tract symptoms, symptom details unspecified      Priority: Medium     Thyroid nodule 08/15/2018     Priority: Medium     right side, 2 cm discovered on 8/15/18         Osteoarthritis Of The Knee      Priority: Medium     Created by Conversion  Replacement Utility updated for latest IMO load    Replacing diagnoses that were inactivated after the 10/1/2021 regulatory import.       GERD (gastroesophageal reflux disease) 07/29/2015     Priority: Medium     Irritable Bowel Syndrome      Priority: Medium     Created by Conversion         Hypertrophy Of Breast      Priority: Medium     Created by Conversion         Joint Pain, Localized In The Left Shoulder      Priority:  Medium     Created by Conversion         Blood Pressure Isolated Elevated      Priority: Medium     Created by Conversion          Past Medical History:   Diagnosis Date     BPH with urinary obstruction      Chronic kidney disease      GERD (gastroesophageal reflux disease) 7/29/2015     Irritable bowel syndrome     Created by Conversion      Osteoarthrosis involving lower leg     Created by Conversion  Replacement Utility updated for latest IMO load     Stage 2 chronic kidney disease 7/29/2015     Thyroid nodule 8/15/2018    right side, 2 cm discovered on 8/15/18     Past Surgical History:   Procedure Laterality Date     CHOLECYSTECTOMY       HC TRANSURETHRAL ELEC-SURG PROSTATECTOM N/A 9/15/2020    Procedure: CYSTOSCOPY, BIPOLAR TRANSURETHRAL RESECTION OF PROSTATE;  Surgeon: Mario Reynolds MD;  Location: [facility];  Service: Urology     JOINT REPLACEMENT       Current Outpatient Medications   Medication Sig Dispense Refill     clindamycin (CLEOCIN T) 1 % external solution APPLY TO AFFECTED AREAS EVERY MORNING TWICE DAILY AS NEEDED       famotidine (FOR PEPCID) 10 MG tablet Take 10 mg by mouth every evening        fluocinonide (LIDEX) 0.05 % external solution APPLY TO THE AFFECTED AREAS ON SCALP ONCE AT BEDTIME. TAPER WHEN CLEAR       ketoconazole (NIZORAL) 2 % external shampoo APPLY TO SCALP 3 TIMES A WEEK AS NEEDED AND RINSE WELL         Allergies   Allergen Reactions     Ciprofloxacin Other (See Comments)     tendonitis     Morphine Unknown     Patient states that he cannot urinate after taking morphine.     Vitamin A Unknown     Paba Derivatives        Social History     Tobacco Use     Smoking status: Former Smoker     Smokeless tobacco: Never Used   Substance Use Topics     Alcohol use: Yes     Alcohol/week: 2.0 standard drinks     Comment: 1-2 / week     Family History   Problem Relation Age of Onset     Dementia Mother      Cancer Father         bladder     Aneurysm Father      No Known  "Problems Maternal Grandmother      No Known Problems Maternal Grandfather      No Known Problems Paternal Grandmother      No Known Problems Paternal Grandfather      No Known Problems Sister      No Known Problems Daughter      No Known Problems Maternal Aunt      No Known Problems Paternal Aunt      Hereditary Breast and Ovarian Cancer Syndrome No family hx of      Breast Cancer No family hx of      Colon Cancer No family hx of      Endometrial Cancer No family hx of      Ovarian Cancer No family hx of      History   Drug Use No         Objective     /82 (BP Location: Left arm, Patient Position: Sitting, Cuff Size: Adult Regular)   Pulse 70   Ht 1.753 m (5' 9\")   Wt 83.7 kg (184 lb 9 oz)   SpO2 95%   BMI 27.26 kg/m      Physical Exam    GENERAL APPEARANCE: healthy, alert and no distress     EYES: EOMI,  PERRL     HENT: ear canals and TM's normal and nose and mouth without ulcers or lesions     NECK: no adenopathy, no asymmetry, masses, or scars and thyroid normal to palpation     RESP: lungs clear to auscultation - no rales, rhonchi or wheezes     CV: regular rates and rhythm, normal S1 S2, no S3 or S4 and no murmur, click or rub     ABDOMEN:  soft, nontender, no HSM or masses and bowel sounds normal     MS: extremities normal- no gross deformities noted, no evidence of inflammation in joints, FROM in all extremities.     SKIN: no suspicious lesions or rashes     NEURO: Normal strength and tone, sensory exam grossly normal, mentation intact and speech normal     PSYCH: mentation appears normal. and affect normal/bright     LYMPHATICS: No cervical adenopathy    Recent Labs   Lab Test 10/07/21  1030 09/30/21  1219 08/04/21  1025 06/03/21  1059   HGB  --  16.7 17.1 17.0   PLT  --  220  --  257   NA  --  141 141 140   POTASSIUM  --  4.4 4.4 4.2   CR  --  1.22 1.13 1.21   A1C 5.3  --   --   --         Diagnostics:  Labs pending at this time.  Results will be reviewed when available.   EKG: appears normal, " NSR, normal axis, normal intervals, no acute ST/T changes c/w ischemia, no LVH by voltage criteria, unchanged from previous tracings    Revised Cardiac Risk Index (RCRI):  The patient has the following serious cardiovascular risks for perioperative complications:   - No serious cardiac risks = 0 points     RCRI Interpretation: 0 points: Class I (very low risk - 0.4% complication rate)           Signed Electronically by: Gabriel Osorio MD  Copy of this evaluation report is provided to requesting physician.

## 2022-05-21 LAB — SARS-COV-2 RNA RESP QL NAA+PROBE: NEGATIVE

## 2022-05-23 ENCOUNTER — TRANSFERRED RECORDS (OUTPATIENT)
Dept: HEALTH INFORMATION MANAGEMENT | Facility: CLINIC | Age: 77
End: 2022-05-23
Payer: MEDICARE

## 2022-05-24 LAB
ATRIAL RATE - MUSE: 68 BPM
DIASTOLIC BLOOD PRESSURE - MUSE: NORMAL MMHG
INTERPRETATION ECG - MUSE: NORMAL
P AXIS - MUSE: 35 DEGREES
PR INTERVAL - MUSE: 194 MS
QRS DURATION - MUSE: 84 MS
QT - MUSE: 396 MS
QTC - MUSE: 421 MS
R AXIS - MUSE: -21 DEGREES
SYSTOLIC BLOOD PRESSURE - MUSE: NORMAL MMHG
T AXIS - MUSE: 8 DEGREES
VENTRICULAR RATE- MUSE: 68 BPM

## 2022-05-31 ENCOUNTER — OFFICE VISIT (OUTPATIENT)
Dept: PODIATRY | Facility: CLINIC | Age: 77
End: 2022-05-31
Payer: MEDICARE

## 2022-05-31 VITALS — WEIGHT: 180 LBS | TEMPERATURE: 97.6 F | BODY MASS INDEX: 26.58 KG/M2 | HEART RATE: 72 BPM

## 2022-05-31 DIAGNOSIS — M19.072 OSTEOARTHRITIS OF LEFT ANKLE AND FOOT: Primary | ICD-10-CM

## 2022-05-31 PROCEDURE — 99213 OFFICE O/P EST LOW 20 MIN: CPT | Performed by: PODIATRIST

## 2022-05-31 RX ORDER — ASPIRIN 81 MG/1
81 TABLET ORAL DAILY
COMMUNITY
End: 2023-04-25

## 2022-05-31 RX ORDER — OXYCODONE AND ACETAMINOPHEN 5; 325 MG/1; MG/1
TABLET ORAL
COMMUNITY
Start: 2022-05-23 | End: 2022-05-31

## 2022-05-31 NOTE — PATIENT INSTRUCTIONS
If you do not hear from the scheduling department please call them at [phone] to schedule your injection.

## 2022-05-31 NOTE — LETTER
5/31/2022         RE: Cali Gama  [address]  [place] [facility] [address]        Dear Colleague,    Thank you for referring your patient, Cali Gama, to the [facility]. Please see a copy of my visit note below.        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     -He did obtain relief from the previous steroid injections with IR in the past.     -I have referred him to IR at this time for steroid injection into the 4th and 5th TMT joints left foot.     -Patient's questions invited and answered. He was encouraged to call my office with any further questions or concerns.     Renato Cary DPM  [facility] Podiatry/Foot & Ankle Surgery      HPI: Cali Gama was seen again today for left foot pain. The patient states he has noticed pain over the past few weeks along the dorsal lateral left foot. He did obtain 100% relief from the previous steroid injections with IR and would like to try this again.     Past Medical History:   Diagnosis Date     BPH with urinary obstruction      Chronic kidney disease      GERD (gastroesophageal reflux disease) 7/29/2015     Irritable bowel syndrome     Created by Conversion      Osteoarthrosis involving lower leg     Created by Conversion  Replacement Utility updated for latest IMO load     Stage 2 chronic kidney disease 7/29/2015     Thyroid nodule 8/15/2018    right side, 2 cm discovered on 8/15/18       Past Surgical History:   Procedure Laterality Date     CHOLECYSTECTOMY       HC TRANSURETHRAL ELEC-SURG PROSTATECTOM N/A 9/15/2020    Procedure: CYSTOSCOPY, BIPOLAR TRANSURETHRAL RESECTION OF PROSTATE;  Surgeon: Mario Reynolds MD;  Location: [facility];  Service: Urology     JOINT REPLACEMENT         Allergies   Allergen Reactions     Ciprofloxacin Other (See Comments)      tendonitis     Morphine Unknown     Patient states that he cannot urinate after taking morphine.     Vitamin A Unknown     Paba Derivatives         Current Outpatient Medications:      aspirin 81 MG EC tablet, Take 81 mg by mouth daily, Disp: , Rfl:      clindamycin (CLEOCIN T) 1 % external solution, APPLY TO AFFECTED AREAS EVERY MORNING TWICE DAILY AS NEEDED, Disp: , Rfl:      famotidine (FOR PEPCID) 10 MG tablet, Take 10 mg by mouth every evening , Disp: , Rfl:      fluocinonide (LIDEX) 0.05 % external solution, APPLY TO THE AFFECTED AREAS ON SCALP ONCE AT BEDTIME. TAPER WHEN CLEAR, Disp: , Rfl:      ketoconazole (NIZORAL) 2 % external shampoo, APPLY TO SCALP 3 TIMES A WEEK AS NEEDED AND RINSE WELL, Disp: , Rfl:     Family History   Problem Relation Age of Onset     Dementia Mother      Cancer Father         bladder     Aneurysm Father      No Known Problems Maternal Grandmother      No Known Problems Maternal Grandfather      No Known Problems Paternal Grandmother      No Known Problems Paternal Grandfather      No Known Problems Sister      No Known Problems Daughter      No Known Problems Maternal Aunt      No Known Problems Paternal Aunt      Hereditary Breast and Ovarian Cancer Syndrome No family hx of      Breast Cancer No family hx of      Colon Cancer No family hx of      Endometrial Cancer No family hx of      Ovarian Cancer No family hx of        Social History     Socioeconomic History     Marital status:      Spouse name: Not on file     Number of children: Not on file     Years of education: Not on file     Highest education level: Not on file   Occupational History     Not on file   Tobacco Use     Smoking status: Former Smoker     Smokeless tobacco: Never Used   Substance and Sexual Activity     Alcohol use: Yes     Alcohol/week: 2.0 standard drinks     Comment: 1-2 / week     Drug use: No     Sexual activity: Not on file   Other Topics Concern     Not on file   Social History Narrative      Not on file     Social Determinants of Health     Financial Resource Strain: Not on file   Food Insecurity: Not on file   Transportation Needs: Not on file   Physical Activity: Not on file   Stress: Not on file   Social Connections: Not on file   Intimate Partner Violence: Not on file   Housing Stability: Not on file       10 point Review of Systems is negative except for left foot pain which is noted in HPI.     Pulse 72   Temp 97.6  F (36.4  C)   Wt 81.6 kg (180 lb)   BMI 26.58 kg/m      BMI= Body mass index is 26.58 kg/m .    OBJECTIVE:  General appearance: Patient is alert and fully cooperative with history & exam.  No sign of distress is noted during the visit.    Vascular: Dorsalis pedis and posterior tibial pulses are palpable. There is pedal hair growth left.  CFT < 3 sec from anterior tibial surface to distal digits left. There is no appreciable edema noted.  Dermatologic: Turgor and texture are within normal limits. No coloration or temperature changes. No primary or secondary lesions noted.  Neurologic: All epicritic and proprioceptive sensations are grossly intact left.  Musculoskeletal: Mild pain 4th and 5th TMT left foot.         Again, thank you for allowing me to participate in the care of your patient.        Sincerely,        Renato Cary DPM

## 2022-06-10 ENCOUNTER — TRANSFERRED RECORDS (OUTPATIENT)
Dept: HEALTH INFORMATION MANAGEMENT | Facility: CLINIC | Age: 77
End: 2022-06-10
Payer: MEDICARE

## 2022-07-05 ENCOUNTER — TRANSFERRED RECORDS (OUTPATIENT)
Dept: HEALTH INFORMATION MANAGEMENT | Facility: CLINIC | Age: 77
End: 2022-07-05

## 2022-07-13 ENCOUNTER — TRANSFERRED RECORDS (OUTPATIENT)
Dept: HEALTH INFORMATION MANAGEMENT | Facility: CLINIC | Age: 77
End: 2022-07-13

## 2022-08-16 ENCOUNTER — TRANSFERRED RECORDS (OUTPATIENT)
Dept: HEALTH INFORMATION MANAGEMENT | Facility: CLINIC | Age: 77
End: 2022-08-16

## 2022-08-24 ENCOUNTER — TELEPHONE (OUTPATIENT)
Dept: PODIATRY | Facility: CLINIC | Age: 77
End: 2022-08-24

## 2022-08-24 ENCOUNTER — HOSPITAL ENCOUNTER (OUTPATIENT)
Dept: RADIOLOGY | Facility: CLINIC | Age: 77
Discharge: HOME | End: 2022-08-24
Attending: PODIATRIST | Admitting: PODIATRIST
Payer: MEDICARE

## 2022-08-24 DIAGNOSIS — M19.072 OSTEOARTHRITIS OF LEFT ANKLE AND FOOT: ICD-10-CM

## 2022-08-24 PROCEDURE — 250N000011 HC RX IP 250 OP 636: Performed by: PODIATRIST

## 2022-08-24 PROCEDURE — 250N000009 HC RX 250: Performed by: PODIATRIST

## 2022-08-24 PROCEDURE — 77002 NEEDLE LOCALIZATION BY XRAY: CPT

## 2022-08-24 RX ORDER — LIDOCAINE HYDROCHLORIDE 10 MG/ML
5 INJECTION, SOLUTION EPIDURAL; INFILTRATION; INTRACAUDAL; PERINEURAL ONCE
Status: COMPLETED | OUTPATIENT
Start: 2022-08-24 | End: 2022-08-24

## 2022-08-24 RX ORDER — BUPIVACAINE HYDROCHLORIDE 2.5 MG/ML
3 INJECTION, SOLUTION EPIDURAL; INFILTRATION; INTRACAUDAL ONCE
Status: COMPLETED | OUTPATIENT
Start: 2022-08-24 | End: 2022-08-24

## 2022-08-24 RX ORDER — TRIAMCINOLONE ACETONIDE 40 MG/ML
80 INJECTION, SUSPENSION INTRA-ARTICULAR; INTRAMUSCULAR ONCE
Status: COMPLETED | OUTPATIENT
Start: 2022-08-24 | End: 2022-08-24

## 2022-08-24 RX ADMIN — TRIAMCINOLONE ACETONIDE 80 MG: 40 INJECTION, SUSPENSION INTRA-ARTICULAR; INTRAMUSCULAR at 11:51

## 2022-08-24 RX ADMIN — BUPIVACAINE HYDROCHLORIDE 7.5 MG: 2.5 INJECTION, SOLUTION EPIDURAL; INFILTRATION; INTRACAUDAL; PERINEURAL at 11:50

## 2022-08-24 RX ADMIN — LIDOCAINE HYDROCHLORIDE 5 ML: 10 INJECTION, SOLUTION EPIDURAL; INFILTRATION; INTRACAUDAL; PERINEURAL at 11:50

## 2022-08-24 NOTE — TELEPHONE ENCOUNTER
Spoke with patient's spouse Mira.  There are no activity restrictions, however it is a good idea to limit activity for the next day or two to give the steroid time to work in the location it was injected into.  No other questions at this time.

## 2022-08-24 NOTE — TELEPHONE ENCOUNTER
Patient's spouse calling because they have gotten conflicting information about the patients activity post injection.  Dr. Cary told them that he can resume normal activities right away, but when they got the injection they were told he should stay off his feet for the next couple days.  They are wanting confirmation from Dr. Cary which instructions they should be following.      Mira's number is [phone]

## 2022-09-30 ENCOUNTER — TRANSFERRED RECORDS (OUTPATIENT)
Dept: HEALTH INFORMATION MANAGEMENT | Facility: CLINIC | Age: 77
End: 2022-09-30

## 2022-10-12 ENCOUNTER — TRANSFERRED RECORDS (OUTPATIENT)
Dept: HEALTH INFORMATION MANAGEMENT | Facility: CLINIC | Age: 77
End: 2022-10-12

## 2023-04-25 ENCOUNTER — OFFICE VISIT (OUTPATIENT)
Dept: FAMILY MEDICINE | Facility: CLINIC | Age: 78
End: 2023-04-25
Payer: MEDICARE

## 2023-04-25 ENCOUNTER — OFFICE VISIT (OUTPATIENT)
Dept: PODIATRY | Facility: CLINIC | Age: 78
End: 2023-04-25
Payer: MEDICARE

## 2023-04-25 VITALS
WEIGHT: 185 LBS | RESPIRATION RATE: 15 BRPM | SYSTOLIC BLOOD PRESSURE: 130 MMHG | HEART RATE: 85 BPM | BODY MASS INDEX: 28.04 KG/M2 | HEIGHT: 68 IN | TEMPERATURE: 98.7 F | DIASTOLIC BLOOD PRESSURE: 80 MMHG | OXYGEN SATURATION: 97 %

## 2023-04-25 VITALS — SYSTOLIC BLOOD PRESSURE: 128 MMHG | RESPIRATION RATE: 18 BRPM | HEART RATE: 68 BPM | DIASTOLIC BLOOD PRESSURE: 76 MMHG

## 2023-04-25 DIAGNOSIS — S46.011S TRAUMATIC TEAR OF RIGHT ROTATOR CUFF, UNSPECIFIED TEAR EXTENT, SEQUELA: ICD-10-CM

## 2023-04-25 DIAGNOSIS — N18.31 STAGE 3A CHRONIC KIDNEY DISEASE (H): ICD-10-CM

## 2023-04-25 DIAGNOSIS — M21.621 TAILOR'S BUNION OF BOTH FEET: ICD-10-CM

## 2023-04-25 DIAGNOSIS — L57.0 KERATOMA: Primary | ICD-10-CM

## 2023-04-25 DIAGNOSIS — R20.2 ARM PARESTHESIA, LEFT: Primary | ICD-10-CM

## 2023-04-25 DIAGNOSIS — M21.622 TAILOR'S BUNION OF BOTH FEET: ICD-10-CM

## 2023-04-25 DIAGNOSIS — M20.41 HAMMERTOES OF BOTH FEET: ICD-10-CM

## 2023-04-25 DIAGNOSIS — I73.9 PAD (PERIPHERAL ARTERY DISEASE) (H): ICD-10-CM

## 2023-04-25 DIAGNOSIS — M19.079 OSTEOARTHRITIS OF ANKLE AND FOOT, UNSPECIFIED LATERALITY: ICD-10-CM

## 2023-04-25 DIAGNOSIS — M20.42 HAMMERTOES OF BOTH FEET: ICD-10-CM

## 2023-04-25 DIAGNOSIS — E85.89 OTHER AMYLOIDOSIS (H): ICD-10-CM

## 2023-04-25 DIAGNOSIS — R61 NIGHT SWEATS: ICD-10-CM

## 2023-04-25 PROCEDURE — 99214 OFFICE O/P EST MOD 30 MIN: CPT | Mod: 25 | Performed by: PODIATRIST

## 2023-04-25 PROCEDURE — 99214 OFFICE O/P EST MOD 30 MIN: CPT | Performed by: FAMILY MEDICINE

## 2023-04-25 PROCEDURE — 11056 PARNG/CUTG B9 HYPRKR LES 2-4: CPT | Mod: Q8 | Performed by: PODIATRIST

## 2023-04-25 ASSESSMENT — PAIN SCALES - GENERAL
PAINLEVEL: NO PAIN (0)
PAINLEVEL: NO PAIN (0)

## 2023-04-25 NOTE — PROGRESS NOTES
FOOT AND ANKLE SURGERY/PODIATRY PROGRESS NOTE        ASSESSMENT:   DJD left midfoot  Keratoma  Luli  Nilson's bunion      TREATMENT:  Soraida: I discussed with the patient that splaying of digits 3 and 4 on the left foot may be attributed to neuroma/capsulitis/plantar plate injury.  He is asymptomatic at this time and will continue to monitor.    -I recommend he continue with current  shoe gear as this appears to provide him relief.    DJD left midfoot: I recommend and have referred him for an additional steroid injection with interventional radiology along the left midfoot into the fourth and fifth tarsometatarsal joints.  He did obtain relief from the initial injection and I recommend he give this another try.    -I also recommend and have referred him for custom orthotics with [facility].  This should help to further offload the left midfoot and provide relief.    Keratoma: I trimmed callus tissue x2 with a #10 blade plantar fifth MPJ on the right foot.    -I recommend use of a pumice stone as needed to remove callus tissue.    -I have also referred the patient to routine foot care providers in the area for nail trimming.    -Patient's questions invited and answered.  He was encouraged to call my office with any further questions or concerns.     Renato Cary DPM  [facility] Podiatry/Foot & Ankle Surgery      HPI: Cali Gama was seen again today for multiple foot concerns.  Patient reports that he has noticed splaying of digits 3 and 4 on the left foot with forefoot pain which was ongoing for several weeks but resolved approximately 4 weeks ago.  He attributes the reduction in pain to wearing  shoes which are extra padded on the inside.  Previous steroid injection with IR to the left midfoot did not provide relief.  He continues to have discomfort with walking.  He has also noticed callus buildup along the plantar lateral aspect of the right foot  along the plantar fifth MPJ.  He does use a pumice stone to trim the callus down as needed.  He would like input as to local providers who can trim his toenails.    Past Medical History:   Diagnosis Date     BPH with urinary obstruction      Chronic kidney disease      GERD (gastroesophageal reflux disease) 7/29/2015     Irritable bowel syndrome     Created by Conversion      Osteoarthrosis involving lower leg     Created by Conversion  Replacement Utility updated for latest IMO load     Stage 2 chronic kidney disease 7/29/2015     Thyroid nodule 8/15/2018    right side, 2 cm discovered on 8/15/18       Past Surgical History:   Procedure Laterality Date     CHOLECYSTECTOMY       HC TRANSURETHRAL ELEC-SURG PROSTATECTOM N/A 9/15/2020    Procedure: CYSTOSCOPY, BIPOLAR TRANSURETHRAL RESECTION OF PROSTATE;  Surgeon: Mario Reynolds MD;  [place] [facility] [place]  Service: Urology     JOINT REPLACEMENT         Allergies   Allergen Reactions     Ciprofloxacin Other (See Comments)     tendonitis     Morphine Unknown     Patient states that he cannot urinate after taking morphine.     Vitamin A Unknown     Paba Derivatives         Current Outpatient Medications:      aspirin 81 MG EC tablet, Take 81 mg by mouth daily, Disp: , Rfl:      clindamycin (CLEOCIN T) 1 % external solution, APPLY TO AFFECTED AREAS EVERY MORNING TWICE DAILY AS NEEDED, Disp: , Rfl:      famotidine (FOR PEPCID) 10 MG tablet, Take 10 mg by mouth every evening , Disp: , Rfl:      fluocinonide (LIDEX) 0.05 % external solution, APPLY TO THE AFFECTED AREAS ON SCALP ONCE AT BEDTIME. TAPER WHEN CLEAR, Disp: , Rfl:      ketoconazole (NIZORAL) 2 % external shampoo, APPLY TO SCALP 3 TIMES A WEEK AS NEEDED AND RINSE WELL, Disp: , Rfl:     Family History   Problem Relation Age of Onset     Dementia Mother      Cancer Father         bladder     Aneurysm Father      No Known Problems Maternal Grandmother      No Known Problems Maternal Grandfather      No  Known Problems Paternal Grandmother      No Known Problems Paternal Grandfather      No Known Problems Sister      No Known Problems Daughter      No Known Problems Maternal Aunt      No Known Problems Paternal Aunt      Hereditary Breast and Ovarian Cancer Syndrome No family hx of      Breast Cancer No family hx of      Colon Cancer No family hx of      Endometrial Cancer No family hx of      Ovarian Cancer No family hx of        Social History     Socioeconomic History     Marital status:      Spouse name: Not on file     Number of children: Not on file     Years of education: Not on file     Highest education level: Not on file   Occupational History     Not on file   Tobacco Use     Smoking status: Former     Smokeless tobacco: Never   Vaping Use     Vaping status: Not on file   Substance and Sexual Activity     Alcohol use: Yes     Alcohol/week: 2.0 standard drinks of alcohol     Comment: 1-2 / week     Drug use: No     Sexual activity: Not on file   Other Topics Concern     Not on file   Social History Narrative     Not on file     Social Determinants of Health     Financial Resource Strain: Not on file   Food Insecurity: Not on file   Transportation Needs: Not on file   Physical Activity: Not on file   Stress: Not on file   Social Connections: Not on file   Intimate Partner Violence: Not on file   Housing Stability: Not on file       10 point Review of Systems is negative except for foot pain which is noted in HPI.     /76   Pulse 68   Resp 18     BMI= There is no height or weight on file to calculate BMI.    OBJECTIVE:  General appearance: Patient is alert and fully cooperative with history & exam.  No sign of distress is noted during the visit.    Vascular: Posterior tibial pulses are non-palpable.   Dermatologic: Hyperkeratotic tissue x2 plantar fifth MPJ on the right foot.  No erythema bilateral feet.  Neurologic: All epicritic and proprioceptive sensations are grossly intact  bilateral.  Musculoskeletal: Contracted digits noted bilateral with mild splaying of digits 3 and 4 left foot.  Prominent metatarsal heads bilaterally.  Mild tailor's bunion bilateral.

## 2023-04-25 NOTE — PATIENT INSTRUCTIONS
Call Interventional Radiology [phone] to schedule your injection.      [place] CUSTOM FOOT ORTHOTICS LOCATIONS  [facility]  [address]  [place] [address]  Phone: [phone]  Fax: [phone] [facility]  [address] [place] [address]  Home Medical Equipment, [address] Phone: [phone]  Orthotics and Prosthetics, [address]  Phone [phone] [facility]  [address]  [place] [address]  Phone: [phone]   Fax: [phone]   [facility]  [address]  [place] [address]  Phone: [phone]  Fax: [phone] [facility]   Home Medical Equipment   [address], [place] [address]  Phone :[phone]  Orthotics and Prosthetics  [address], [address], [facility] [address]  Phone:[phone]   [facility]  [address]  [place] [address]  Phone: [phone]   Fax: [phone]   [facility]   [address]  [place] [address]  Phone: [phone]  Fax: [phone] [facility]   [address]  [place] [address]  Phone: [phone] [place]  [address] [place] [address]  Phone :[phone]             WEARING YOUR CUSTOM FOOT ORTHOTICS   Most insurance plans cover one pair of orthotics per year. You must check with your   insurance plan to see what your payment responsibility will be. Please call your   insurance company by calling the number on the back of your insurance card.   Orthotic's are non-refundable and non-returnable.   Orthotics are made of various designs. Some orthotics are covered with material that extends beyond your toes. If your orthotic is of this design, you will likely need to trim the toe end to get a proper fit. The insole from your shoe can be used as a  template. Simply overlay the shoe insert on top of the custom orthotic. Align the heel end while tracing the length of the insert onto the custom orthotic. Use a large scissor to trim the toe end until you get a proper fit in the shoe.   The orthotic needs to be pushed as far back in the shoe as possible. The heel portion should not ride forward so as not to irritate your heel.   Orthotics are designed to work with socks. Excessive perspiration will shorten the life span of the orthotics. Remove the orthotic from the shoe frequently for proper drying.   The break-in period lasts for weeks. People new to orthotics will likely experience new aches and pains. The orthotic is forcing your foot into a new position. Arch, foot and leg muscle aches and fatigue are common during these weeks. Minor discomfort can be considered normal break in phenomenon. Start wearing your orthotic around your home your first day. Limited activity for one to two hours is recommended. You can increase one or two additional hours each day provided the aches and pains are subsiding. The degree of discomfort, fatigue and problems will dictate the speed of break in. You may require multiple weeks to work up to full time use.   Do not continue wearing your orthotics if they are creating problems such as blisters or sores. Do not hesitate to call the clinic to speak with a nurse regarding orthotic   break in, fit, trimming, etc. You may also need to see the doctor if the orthotics are   simply not working out. Adjustments are sometimes made to improve orthotic   function.     Orthotics will only work in certain styles and types of shoes. Orthotics rarely work in dress shoes. Slip-ons, clogs, sandals and heels are particularly troublesome. Specially designed orthotics may be necessary for these types of shoes. Your custom orthotic was designed for activities that require appropriate walking or running shoes. Lace up athletic shoes, walking shoes or  work boots should work appropriately. You may need a wider or longer shoe. Shoes with a removable  or insert work best. In general, you want to remove an insert from the shoe before placing the orthotic into the shoe. Shoes without a removable liner may not work as well.     When purchasing new shoes, bring your orthotics along to get a proper fit. Shop at stores that are familiar with orthotics.   Frequent washing of the orthotic may shorten the life span of the top cover. The top cover can be replaced but will generally last one to five years depending on use and foot perspiration.      Your doctor recommends you use a pumice stone to get rid of your callous. You can purchase a pumice stone at your local drug store.    How to use your Pumice Stone        1. Soak your calloused skin in warm water. The most common part of the body to exfoliate with a pumice stone is the feet. Heels tend to develop a layer of hard, calloused skin that can become cracked or scaled. Soak the calloused body part in warm water for about five minutes to soften the skin.    2. Wait until your dry skin has softened. The skin will be easier to remove if it's soft and supple. Feel your skin after several minutes of soaking. If it still feels tough, wait a few more minutes (giving the water a warm-up if necessary). If it's soft, your skin is ready for the pumice stone.     3. Wet the stone. Wetting the stone will help it slide more easily across your skin, rather than catching on it. Run the stone under warm water, or dip it in the water where you're soaking your skin, in order to thoroughly wet it.    4. Rub it gently over the calloused area. Use a circular motion to start sloughing away the dead skin with the pumice stone. If the skin is nice and soft, it should start coming right off. Keep going until you remove the dead skin and get to the fresh, supple skin underneath.   Don't press too hard. Light pressure is all that is  needed; let the surface of the stone do the work.   If you're working on your feet, focus on the heels, the sides of your toes, and other areas where dry skin tends to build up.    5. Rinse and repeat. Rinse off the dead skin and take a look to see if you need to keep going. If you still see bits of dead skin, go over the area again with the pumice stone. Continue using the stone on the area until you're satisfied with the results.   Since the pumice stone will wear down slightly while you use it, you may need to turn it over to get a fresh surface you can use to exfoliate your skin.   Rinse the pumice stone often to keep its surface clean and effective.    6. Dry and moisturize your skin. When you're finished, use a towel to pat your skin dry. Coat the area with an oil or cream to prevent it from drying out too quickly. Your formerly calloused skin should now be soft, supple and gleaming.   Coconut oil, almond oil, or body lotion are all fine to use to condition your skin after pumicing.   Repeat as often as needed to keep your skin in good shape.    CARING FOR YOUR PUMICE STONE:    1. Scrub it after use. Dead skin will build up in the pores of the stone as you use it, so you'll want to clean the stone after use. Use a scrub brush to scrub the stone while holding it under running water. Add a bit of soap to help clean the stone completely. This way your stone will be clean and ready to use next time you need it.     2. Allow it to completely dry out. Set the pumice stone in a dry place so that it doesn't stay damp in between uses. Some pumice stones come with a string attached that allows you to hang the stone to dry. If you let the stone stay wet, bacteria could grow in the pores, making it unsafe to use.     3. Boil it if necessary. Every once in a while, you'll want to give the stone a deep cleaning to make sure it isn't harboring bacteria. Bring a small pot of water to a full boil, drop in the stone, and boil  it for five minutes. Use tongs to remove the stone from the water and allow it to dry completely before storing.   If you use the stone frequently, boil it every two weeks to ensure it stays clean.   If you'd like, you can add a capful of bleach to the water to be certain all the bacteria is killed.    4. Replace the stone when it wears down. Pumice is a soft stone that will eventually wear away after you've used it for awhile. When it gets too small to handle easily, or the surface becomes too smooth to be effective, purchase a new one. Pumice stones are inexpensive and can be found at any store that sells beauty supplies.          Podiatry Clinics that offer foot and toenail care  [facility]  [facility]  [phone]    Foot and Ankle Clinics, [facility]  [phone]    [facility]  [place] - Dr. Ang on Tuesdays  [phone]    [facility]  [place]  [phone]    [facility]  [facility]  [phone]    [facility]  [phone]    [facility]  [phone]      [facility] Foot [facility]  *Nurse comes to your home for nail care.  Jailene Salmeron RN Foot Specialist  [phone]
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
## 2023-04-25 NOTE — LETTER
4/25/2023         RE: Cali Gama  [address]  [place] [address]        Dear Colleague,    Thank you for referring your patient, Cali Gama, to the [facility]. Please see a copy of my visit note below.        FOOT AND ANKLE SURGERY/PODIATRY PROGRESS NOTE        ASSESSMENT:   DJD left midfoot  Keratoma  Hammertoe  Tailor's bunion      TREATMENT:  Hammertoes: I discussed with the patient that splaying of digits 3 and 4 on the left foot may be attributed to neuroma/capsulitis/plantar plate injury.  He is asymptomatic at this time and will continue to monitor.    -I recommend he continue with current  shoe gear as this appears to provide him relief.    DJD left midfoot: I recommend and have referred him for an additional steroid injection with interventional radiology along the left midfoot into the fourth and fifth tarsometatarsal joints.  He did obtain relief from the initial injection and I recommend he give this another try.    -I also recommend and have referred him for custom orthotics with [facility].  This should help to further offload the left midfoot and provide relief.    Keratoma: I trimmed callus tissue x2 with a #10 blade plantar fifth MPJ on the right foot.    -I recommend use of a pumice stone as needed to remove callus tissue.    -I have also referred the patient to routine foot care providers in the area for nail trimming.    -Patient's questions invited and answered.  He was encouraged to call my office with any further questions or concerns.     Renato Cary DPM  [facility] Podiatry/Foot & Ankle Surgery      HPI: Cali Gama was seen again today for multiple foot concerns.  Patient reports that he has noticed splaying of digits 3 and 4 on the left foot with forefoot pain which was ongoing for several weeks but resolved approximately 4 weeks ago.  He attributes the reduction in pain to wearing   shoes which are extra padded on the inside.  Previous steroid injection with IR to the left midfoot did not provide relief.  He continues to have discomfort with walking.  He has also noticed callus buildup along the plantar lateral aspect of the right foot along the plantar fifth MPJ.  He does use a pumice stone to trim the callus down as needed.  He would like input as to local providers who can trim his toenails.    Past Medical History:   Diagnosis Date     BPH with urinary obstruction      Chronic kidney disease      GERD (gastroesophageal reflux disease) 7/29/2015     Irritable bowel syndrome     Created by Conversion      Osteoarthrosis involving lower leg     Created by Conversion  Replacement Utility updated for latest IMO load     Stage 2 chronic kidney disease 7/29/2015     Thyroid nodule 8/15/2018    right side, 2 cm discovered on 8/15/18       Past Surgical History:   Procedure Laterality Date     CHOLECYSTECTOMY       HC TRANSURETHRAL ELEC-SURG PROSTATECTOM N/A 9/15/2020    Procedure: CYSTOSCOPY, BIPOLAR TRANSURETHRAL RESECTION OF PROSTATE;  Surgeon: Mario Reynolds MD;  Location: [facility];  Service: Urology     JOINT REPLACEMENT         Allergies   Allergen Reactions     Ciprofloxacin Other (See Comments)     tendonitis     Morphine Unknown     Patient states that he cannot urinate after taking morphine.     Vitamin A Unknown     Paba Derivatives         Current Outpatient Medications:      aspirin 81 MG EC tablet, Take 81 mg by mouth daily, Disp: , Rfl:      clindamycin (CLEOCIN T) 1 % external solution, APPLY TO AFFECTED AREAS EVERY MORNING TWICE DAILY AS NEEDED, Disp: , Rfl:      famotidine (FOR PEPCID) 10 MG tablet, Take 10 mg by mouth every evening , Disp: , Rfl:      fluocinonide (LIDEX) 0.05 % external solution, APPLY TO THE AFFECTED AREAS ON SCALP ONCE AT BEDTIME. TAPER WHEN CLEAR, Disp: , Rfl:      ketoconazole (NIZORAL) 2 % external shampoo, APPLY TO SCALP 3 TIMES A WEEK AS  NEEDED AND RINSE WELL, Disp: , Rfl:     Family History   Problem Relation Age of Onset     Dementia Mother      Cancer Father         bladder     Aneurysm Father      No Known Problems Maternal Grandmother      No Known Problems Maternal Grandfather      No Known Problems Paternal Grandmother      No Known Problems Paternal Grandfather      No Known Problems Sister      No Known Problems Daughter      No Known Problems Maternal Aunt      No Known Problems Paternal Aunt      Hereditary Breast and Ovarian Cancer Syndrome No family hx of      Breast Cancer No family hx of      Colon Cancer No family hx of      Endometrial Cancer No family hx of      Ovarian Cancer No family hx of        Social History     Socioeconomic History     Marital status:      Spouse name: Not on file     Number of children: Not on file     Years of education: Not on file     Highest education level: Not on file   Occupational History     Not on file   Tobacco Use     Smoking status: Former     Smokeless tobacco: Never   Vaping Use     Vaping status: Not on file   Substance and Sexual Activity     Alcohol use: Yes     Alcohol/week: 2.0 standard drinks of alcohol     Comment: 1-2 / week     Drug use: No     Sexual activity: Not on file   Other Topics Concern     Not on file   Social History Narrative     Not on file     Social Determinants of Health     Financial Resource Strain: Not on file   Food Insecurity: Not on file   Transportation Needs: Not on file   Physical Activity: Not on file   Stress: Not on file   Social Connections: Not on file   Intimate Partner Violence: Not on file   Housing Stability: Not on file       10 point Review of Systems is negative except for foot pain which is noted in HPI.     /76   Pulse 68   Resp 18     BMI= There is no height or weight on file to calculate BMI.    OBJECTIVE:  General appearance: Patient is alert and fully cooperative with history & exam.  No sign of distress is noted during the  visit.    Vascular: Posterior tibial pulses are non-palpable.   Dermatologic: Hyperkeratotic tissue x2 plantar fifth MPJ on the right foot.  No erythema bilateral feet.  Neurologic: All epicritic and proprioceptive sensations are grossly intact bilateral.  Musculoskeletal: Contracted digits noted bilateral with mild splaying of digits 3 and 4 left foot.  Prominent metatarsal heads bilaterally.  Mild tailor's bunion bilateral.        Again, thank you for allowing me to participate in the care of your patient.        Sincerely,        Renato Cary DPM

## 2023-05-02 ENCOUNTER — TRANSFERRED RECORDS (OUTPATIENT)
Dept: HEALTH INFORMATION MANAGEMENT | Facility: CLINIC | Age: 78
End: 2023-05-02
Payer: MEDICARE

## 2023-05-14 ENCOUNTER — HEALTH MAINTENANCE LETTER (OUTPATIENT)
Age: 78
End: 2023-05-14

## 2023-05-17 ENCOUNTER — HOSPITAL ENCOUNTER (OUTPATIENT)
Dept: RADIOLOGY | Facility: CLINIC | Age: 78
Discharge: HOME | End: 2023-05-17
Attending: PODIATRIST | Admitting: RADIOLOGY
Payer: MEDICARE

## 2023-05-17 DIAGNOSIS — L57.0 KERATOMA: ICD-10-CM

## 2023-05-17 PROCEDURE — 250N000011 HC RX IP 250 OP 636: Performed by: PODIATRIST

## 2023-05-17 PROCEDURE — 77002 NEEDLE LOCALIZATION BY XRAY: CPT

## 2023-05-17 RX ORDER — TRIAMCINOLONE ACETONIDE 40 MG/ML
80 INJECTION, SUSPENSION INTRA-ARTICULAR; INTRAMUSCULAR ONCE
Status: COMPLETED | OUTPATIENT
Start: 2023-05-17 | End: 2023-05-17

## 2023-05-17 RX ORDER — BUPIVACAINE HYDROCHLORIDE 2.5 MG/ML
2 INJECTION, SOLUTION EPIDURAL; INFILTRATION; INTRACAUDAL ONCE
Status: COMPLETED | OUTPATIENT
Start: 2023-05-17 | End: 2023-05-17

## 2023-05-17 RX ORDER — IOPAMIDOL 612 MG/ML
5 INJECTION, SOLUTION INTRATHECAL ONCE
Status: COMPLETED | OUTPATIENT
Start: 2023-05-17 | End: 2023-05-17

## 2023-05-17 RX ADMIN — TRIAMCINOLONE ACETONIDE 80 MG: 40 INJECTION, SUSPENSION INTRA-ARTICULAR; INTRAMUSCULAR at 11:43

## 2023-05-17 RX ADMIN — IOPAMIDOL 5 ML: 612 INJECTION, SOLUTION INTRAVENOUS at 11:42

## 2023-05-17 RX ADMIN — BUPIVACAINE HYDROCHLORIDE 5 MG: 2.5 INJECTION, SOLUTION EPIDURAL; INFILTRATION; INTRACAUDAL; PERINEURAL at 11:42

## 2023-06-28 ENCOUNTER — APPOINTMENT (OUTPATIENT)
Dept: CT IMAGING | Facility: CLINIC | Age: 78
End: 2023-06-28
Attending: EMERGENCY MEDICINE
Payer: MEDICARE

## 2023-06-28 ENCOUNTER — HOSPITAL ENCOUNTER (EMERGENCY)
Facility: CLINIC | Age: 78
Discharge: HOME | End: 2023-06-28
Attending: EMERGENCY MEDICINE | Admitting: EMERGENCY MEDICINE
Payer: MEDICARE

## 2023-06-28 VITALS
TEMPERATURE: 98 F | BODY MASS INDEX: 26.66 KG/M2 | HEART RATE: 80 BPM | SYSTOLIC BLOOD PRESSURE: 189 MMHG | RESPIRATION RATE: 16 BRPM | OXYGEN SATURATION: 97 % | DIASTOLIC BLOOD PRESSURE: 104 MMHG | WEIGHT: 180 LBS | HEIGHT: 69 IN

## 2023-06-28 DIAGNOSIS — R10.9 ABDOMINAL PAIN, UNSPECIFIED ABDOMINAL LOCATION: ICD-10-CM

## 2023-06-28 LAB
ALBUMIN SERPL-MCNC: 4.4 G/DL (ref 3.5–5)
ALBUMIN UR-MCNC: NEGATIVE MG/DL
ALP SERPL-CCNC: 88 U/L (ref 45–120)
ALT SERPL W P-5'-P-CCNC: 19 U/L (ref 0–45)
ANION GAP SERPL CALCULATED.3IONS-SCNC: 12 MMOL/L (ref 5–18)
APPEARANCE UR: CLEAR
AST SERPL W P-5'-P-CCNC: 25 U/L (ref 0–40)
ATRIAL RATE - MUSE: 69 BPM
BASOPHILS # BLD AUTO: 0.1 10E3/UL (ref 0–0.2)
BASOPHILS NFR BLD AUTO: 1 %
BILIRUB SERPL-MCNC: 0.6 MG/DL (ref 0–1)
BILIRUB UR QL STRIP: NEGATIVE
BUN SERPL-MCNC: 17 MG/DL (ref 8–28)
CALCIUM SERPL-MCNC: 8.8 MG/DL (ref 8.5–10.5)
CHLORIDE BLD-SCNC: 106 MMOL/L (ref 98–107)
CO2 SERPL-SCNC: 22 MMOL/L (ref 22–31)
COLOR UR AUTO: COLORLESS
CREAT SERPL-MCNC: 1.33 MG/DL (ref 0.7–1.3)
DIASTOLIC BLOOD PRESSURE - MUSE: 92 MMHG
EOSINOPHIL # BLD AUTO: 0.1 10E3/UL (ref 0–0.7)
EOSINOPHIL NFR BLD AUTO: 1 %
ERYTHROCYTE [DISTWIDTH] IN BLOOD BY AUTOMATED COUNT: 13.3 % (ref 10–15)
GFR SERPL CREATININE-BSD FRML MDRD: 54 ML/MIN/1.73M2
GLUCOSE BLD-MCNC: 121 MG/DL (ref 70–125)
GLUCOSE UR STRIP-MCNC: NEGATIVE MG/DL
HCT VFR BLD AUTO: 50.4 % (ref 40–53)
HGB BLD-MCNC: 17 G/DL (ref 13.3–17.7)
HGB UR QL STRIP: NEGATIVE
HOLD SPECIMEN: NORMAL
IMM GRANULOCYTES # BLD: 0.1 10E3/UL
IMM GRANULOCYTES NFR BLD: 1 %
INTERPRETATION ECG - MUSE: NORMAL
KETONES UR STRIP-MCNC: NEGATIVE MG/DL
LACTATE SERPL-SCNC: 0.9 MMOL/L (ref 0.7–2)
LEUKOCYTE ESTERASE UR QL STRIP: NEGATIVE
LIPASE SERPL-CCNC: 41 U/L (ref 0–52)
LYMPHOCYTES # BLD AUTO: 1.8 10E3/UL (ref 0.8–5.3)
LYMPHOCYTES NFR BLD AUTO: 23 %
MCH RBC QN AUTO: 31.5 PG (ref 26.5–33)
MCHC RBC AUTO-ENTMCNC: 33.7 G/DL (ref 31.5–36.5)
MCV RBC AUTO: 94 FL (ref 78–100)
MONOCYTES # BLD AUTO: 0.5 10E3/UL (ref 0–1.3)
MONOCYTES NFR BLD AUTO: 7 %
NEUTROPHILS # BLD AUTO: 5.4 10E3/UL (ref 1.6–8.3)
NEUTROPHILS NFR BLD AUTO: 67 %
NITRATE UR QL: NEGATIVE
NRBC # BLD AUTO: 0 10E3/UL
NRBC BLD AUTO-RTO: 0 /100
P AXIS - MUSE: 42 DEGREES
PH UR STRIP: 6.5 [PH] (ref 5–7)
PLATELET # BLD AUTO: 254 10E3/UL (ref 150–450)
POTASSIUM BLD-SCNC: 4.3 MMOL/L (ref 3.5–5)
PR INTERVAL - MUSE: 184 MS
PROT SERPL-MCNC: 7.3 G/DL (ref 6–8)
QRS DURATION - MUSE: 86 MS
QT - MUSE: 388 MS
QTC - MUSE: 415 MS
R AXIS - MUSE: -16 DEGREES
RBC # BLD AUTO: 5.39 10E6/UL (ref 4.4–5.9)
RBC URINE: <1 /HPF
SODIUM SERPL-SCNC: 140 MMOL/L (ref 136–145)
SP GR UR STRIP: 1.01 (ref 1–1.03)
SYSTOLIC BLOOD PRESSURE - MUSE: 174 MMHG
T AXIS - MUSE: 23 DEGREES
UROBILINOGEN UR STRIP-MCNC: <2 MG/DL
VENTRICULAR RATE- MUSE: 69 BPM
WBC # BLD AUTO: 8 10E3/UL (ref 4–11)
WBC URINE: 0 /HPF

## 2023-06-28 PROCEDURE — G1010 CDSM STANSON: HCPCS

## 2023-06-28 PROCEDURE — 250N000011 HC RX IP 250 OP 636: Mod: JZ | Performed by: EMERGENCY MEDICINE

## 2023-06-28 PROCEDURE — 81001 URINALYSIS AUTO W/SCOPE: CPT | Performed by: EMERGENCY MEDICINE

## 2023-06-28 PROCEDURE — 83690 ASSAY OF LIPASE: CPT | Performed by: EMERGENCY MEDICINE

## 2023-06-28 PROCEDURE — 80053 COMPREHEN METABOLIC PANEL: CPT | Performed by: EMERGENCY MEDICINE

## 2023-06-28 PROCEDURE — 83605 ASSAY OF LACTIC ACID: CPT | Performed by: EMERGENCY MEDICINE

## 2023-06-28 PROCEDURE — 93005 ELECTROCARDIOGRAM TRACING: CPT | Performed by: EMERGENCY MEDICINE

## 2023-06-28 PROCEDURE — 74177 CT ABD & PELVIS W/CONTRAST: CPT | Mod: MG

## 2023-06-28 PROCEDURE — 96360 HYDRATION IV INFUSION INIT: CPT | Mod: 59

## 2023-06-28 PROCEDURE — 258N000003 HC RX IP 258 OP 636: Performed by: EMERGENCY MEDICINE

## 2023-06-28 PROCEDURE — 99285 EMERGENCY DEPT VISIT HI MDM: CPT | Mod: 25

## 2023-06-28 PROCEDURE — 85014 HEMATOCRIT: CPT | Performed by: EMERGENCY MEDICINE

## 2023-06-28 PROCEDURE — 36415 COLL VENOUS BLD VENIPUNCTURE: CPT | Performed by: EMERGENCY MEDICINE

## 2023-06-28 RX ORDER — IOPAMIDOL 755 MG/ML
75 INJECTION, SOLUTION INTRAVASCULAR ONCE
Status: COMPLETED | OUTPATIENT
Start: 2023-06-28 | End: 2023-06-28

## 2023-06-28 RX ADMIN — SODIUM CHLORIDE 1000 ML: 9 INJECTION, SOLUTION INTRAVENOUS at 16:19

## 2023-06-28 RX ADMIN — IOPAMIDOL 75 ML: 755 INJECTION, SOLUTION INTRAVENOUS at 16:39

## 2023-06-28 ASSESSMENT — ACTIVITIES OF DAILY LIVING (ADL): ADLS_ACUITY_SCORE: 35

## 2023-06-28 NOTE — DISCHARGE INSTRUCTIONS
Your work-up in the emergency department does not show any serious cause for your pain  Try some antacids or laxatives  Return tomorrow if you have ongoing pain
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
## 2023-06-28 NOTE — ED PROVIDER NOTES
EMERGENCY DEPARTMENT ENCOUNTER            IMPRESSION:  Nonspecific abdominal pain      MEDICAL DECISION MAKING:  It was my pleasure to provide care for Cali Gama who presented for evaluation of abdominal pain    On my exam patient is pleasant and cooperative.  No evidence of distress.  Vital signs show hypertension otherwise normal.  Physical exam notable for some minimal upper abdominal tenderness.     IV fluid administered for symptom relief.  Patient's symptoms improved.     EKG does not show any evidence of ischemia or arrhythmia    Significant laboratory findings include slightly elevated creatinine otherwise CBC chemistry normal    CT imaging of the abdomen does not show any acute findings.  Imaging independently reviewed and agree with radiologist findings of no appendicitis    ED evaluation is consistent with nonspecific abdominal pain.      Patient was reevaluated and results were discussed.  I recommended outpatient follow-up      Prior to making a final disposition on this patient the results of patient's tests and other diagnostic studies were discussed with the patient. All questions were answered. Patient expressed understanding of the plan and was amenable.   Return precautions and follow-up were discussed.     =================================================================  CHIEF COMPLAINT:  Chief Complaint   Patient presents with     Abdominal Pain         HPI  Cali Gama is a 78 year old male with a history of IBS, CKD3, GERD, and s/p cholecystectomy who presents to the ED by private car with his wife for evaluation of abdominal pain.    The patient is presenting with right upper quadrant abdominal pain that started shortly after eating around 0900 this morning. He has not had any associated nausea or vomiting. He is feeling a little constipated, but was able to pass a bowel movement this morning. He has not had any fever or urinary symptoms. The patient reports a long history of similar  pain.    Back in 2000 the patient was having issues with his bile duct and had a cholecystectomy and a stent was placed in the bile duct. The stent was supposed to be removed 6 weeks after placement, but it was not removed and the stent remained in place for about 15 years. 3-4 years ago the patient started to have pain in his right upper quadrant again. He had an ERCP and another stent placed, however, the surgery was complicated by accidental perforation the patient's intestine. He had a patch placed to repair the perforation. He has since had the biliary stent removed.      REVIEW OF SYSTEMS   Constitutional: Does not report chills, unintentional weight loss or fatigue   Eyes: Does not report visual changes or discharge    HENT: Does not report sore throat, ear pain or neck pain  Respiratory: Does not report cough or shortness of breath    Cardiovascular: Does not report chest pain, palpitations or leg swelling  GI: Does not report nausea, vomiting, or dark, bloody stools.  Endorses abdominal pain.  : Does not report hematuria, dysuria, or flank pain  Musculoskeletal: Does not report any new musculoskeletal pain or new muscle/joint pains  Skin: Does not report rash or wound  Neurologic: Does not report current headache, new weakness, focal weakness, or sensory changes        Remainder of systems reviewed, unless noted in HPI all others negative.      PAST MEDICAL HISTORY:  Past Medical History:   Diagnosis Date     BPH with urinary obstruction      Chronic kidney disease      GERD (gastroesophageal reflux disease) 7/29/2015     Irritable bowel syndrome     Created by Conversion      Osteoarthrosis involving lower leg     Created by Conversion  Replacement Utility updated for latest IMO load     Stage 2 chronic kidney disease 7/29/2015     Thyroid nodule 8/15/2018    right side, 2 cm discovered on 8/15/18       PAST SURGICAL HISTORY:  Past Surgical History:   Procedure Laterality Date     CHOLECYSTECTOMY        "HC TRANSURETHRAL ELEC-SURG PROSTATECTOM N/A 9/15/2020    Procedure: CYSTOSCOPY, BIPOLAR TRANSURETHRAL RESECTION OF PROSTATE;  Surgeon: Mario Reynolds MD;  Location: [facility] [place];  Service: Urology     JOINT REPLACEMENT           CURRENT MEDICATIONS:    famotidine (FOR PEPCID) 10 MG tablet  fluocinonide (LIDEX) 0.05 % external solution  ketoconazole (NIZORAL) 2 % external shampoo        ALLERGIES:  Allergies   Allergen Reactions     Ciprofloxacin Other (See Comments)     tendonitis     Morphine Unknown     Patient states that he cannot urinate after taking morphine.     Vitamin A Unknown     Paba Derivatives       FAMILY HISTORY:  Family History   Problem Relation Age of Onset     Dementia Mother      Cancer Father         bladder     Aneurysm Father      No Known Problems Maternal Grandmother      No Known Problems Maternal Grandfather      No Known Problems Paternal Grandmother      No Known Problems Paternal Grandfather      No Known Problems Sister      No Known Problems Daughter      No Known Problems Maternal Aunt      No Known Problems Paternal Aunt      Hereditary Breast and Ovarian Cancer Syndrome No family hx of      Breast Cancer No family hx of      Colon Cancer No family hx of      Endometrial Cancer No family hx of      Ovarian Cancer No family hx of        SOCIAL HISTORY:   Social History     Socioeconomic History     Marital status:    Tobacco Use     Smoking status: Former     Smokeless tobacco: Never   Substance and Sexual Activity     Alcohol use: Yes     Alcohol/week: 2.0 standard drinks of alcohol     Comment: 1-2 / week     Drug use: No       PHYSICAL EXAM:    BP (!) 189/104   Pulse 80   Temp 98  F (36.7  C) (Temporal)   Resp 16   Ht 1.753 m (5' 9\")   Wt 81.6 kg (180 lb)   SpO2 97%   BMI 26.58 kg/m      Constitutional: Awake, alert, no evidence of distress  Head: Normocephalic, atraumatic.  ENT: Mucous membranes are moist.  No pallor.   Eyes: Pupils are reactive.  No " discoloration.  Neck: No lymphadenopathy, no stridor, supple, no soft tissue swelling  Chest: No tenderness   Respiratory: Respirations even, unlabored. Lungs clear to ascultation bilaterally, in no acute respiratory distress.  Cardiovascular: Regular rate and rhythm.  Good overall perfusion.  Upper and lower extremity pulses are equal.  GI: Minimal upper abdominal pain  Back: No CVA tenderness.    Musculoskeletal: Moves all 4 extremities equally, full function and capacity no peripheral edema.   Integument: Warm, dry. No rash. No bruising or petechiae.  Neurologic: Alert & oriented x 3. Normal speech. Grossly normal motor and sensory function. No focal deficits noted.  NIHSS = 0  Psychiatric: Normal mood and affect.  Appropriate judgement.    ED COURSE:  3:30 PM I introduced myself to the patient, obtained patient history, performed a physical exam, and discussed plan for ED workup including potential diagnostic laboratory/imaging studies and interventions. The patient is declining any pain medication at this time.  5:30 PM Rechecked and updated the patient. We discussed the plan for discharge and the patient is agreeable. Reviewed supportive cares, symptomatic treatment, outpatient follow up, and reasons to return to the Emergency Department. Patient to be discharged by ED RN.         Medical Decision Making    History:    Supplemental history from: Family    External Record(s) reviewed: External medical records including care everywhere reviewed    Work Up:    EKG, laboratory and imaging studies as ordered were independently reviewed by the provider    Broad differential diagnosis considered for abdominal pain    The patient's presentation was of high complexity.     External consultation:    Discussion of management with another provider: Not Applicable    Complicating factors:    Patient has a complicated past medical history including: IBS and chronic kidney disease    Care affected by social determinants of  health: Access to primary care    Disposition involved shared decision-making with the patient.  The patient's management necessitated high risk regarding consideration for hospitalization     Admission not indicated.    Patient otherwise to continue outpatient medications as prescribed.       LAB:  Laboratory results were independently reviewed and interpreted  Results for orders placed or performed during the hospital encounter of 06/28/23   CT Abdomen Pelvis w Contrast    Impression    IMPRESSION:   1.  No acute findings to explain the patient's pain.    2.  Moderate diffuse diverticulosis in the colon but nothing for diverticulitis.   Extra Blue Top Tube   Result Value Ref Range    Hold Specimen JIC    Extra Red Top Tube   Result Value Ref Range    Hold Specimen JIC    Extra Green Top (Lithium Heparin) Tube   Result Value Ref Range    Hold Specimen JIC    Extra Purple Top Tube   Result Value Ref Range    Hold Specimen JIC    Comprehensive metabolic panel   Result Value Ref Range    Sodium 140 136 - 145 mmol/L    Potassium 4.3 3.5 - 5.0 mmol/L    Chloride 106 98 - 107 mmol/L    Carbon Dioxide (CO2) 22 22 - 31 mmol/L    Anion Gap 12 5 - 18 mmol/L    Urea Nitrogen 17 8 - 28 mg/dL    Creatinine 1.33 (H) 0.70 - 1.30 mg/dL    Calcium 8.8 8.5 - 10.5 mg/dL    Glucose 121 70 - 125 mg/dL    Alkaline Phosphatase 88 45 - 120 U/L    AST 25 0 - 40 U/L    ALT 19 0 - 45 U/L    Protein Total 7.3 6.0 - 8.0 g/dL    Albumin 4.4 3.5 - 5.0 g/dL    Bilirubin Total 0.6 0.0 - 1.0 mg/dL    GFR Estimate 54 (L) >60 mL/min/1.73m2   Result Value Ref Range    Lipase 41 0 - 52 U/L   Lactic acid whole blood   Result Value Ref Range    Lactic Acid 0.9 0.7 - 2.0 mmol/L   UA with Microscopic reflex to Culture    Specimen: Urine, Clean Catch   Result Value Ref Range    Color Urine Colorless Colorless, Straw, Light Yellow, Yellow    Appearance Urine Clear Clear    Glucose Urine Negative Negative mg/dL    Bilirubin Urine Negative Negative    Ketones  Urine Negative Negative mg/dL    Specific Gravity Urine 1.015 1.001 - 1.030    Blood Urine Negative Negative    pH Urine 6.5 5.0 - 7.0    Protein Albumin Urine Negative Negative mg/dL    Urobilinogen Urine <2.0 <2.0 mg/dL    Nitrite Urine Negative Negative    Leukocyte Esterase Urine Negative Negative    RBC Urine <1 <=2 /HPF    WBC Urine 0 <=5 /HPF   CBC with platelets and differential   Result Value Ref Range    WBC Count 8.0 4.0 - 11.0 10e3/uL    RBC Count 5.39 4.40 - 5.90 10e6/uL    Hemoglobin 17.0 13.3 - 17.7 g/dL    Hematocrit 50.4 40.0 - 53.0 %    MCV 94 78 - 100 fL    MCH 31.5 26.5 - 33.0 pg    MCHC 33.7 31.5 - 36.5 g/dL    RDW 13.3 10.0 - 15.0 %    Platelet Count 254 150 - 450 10e3/uL    % Neutrophils 67 %    % Lymphocytes 23 %    % Monocytes 7 %    % Eosinophils 1 %    % Basophils 1 %    % Immature Granulocytes 1 %    NRBCs per 100 WBC 0 <1 /100    Absolute Neutrophils 5.4 1.6 - 8.3 10e3/uL    Absolute Lymphocytes 1.8 0.8 - 5.3 10e3/uL    Absolute Monocytes 0.5 0.0 - 1.3 10e3/uL    Absolute Eosinophils 0.1 0.0 - 0.7 10e3/uL    Absolute Basophils 0.1 0.0 - 0.2 10e3/uL    Absolute Immature Granulocytes 0.1 <=0.4 10e3/uL    Absolute NRBCs 0.0 10e3/uL   ECG 12-LEAD WITH MUSE (LHE)   Result Value Ref Range    Systolic Blood Pressure 174 mmHg    Diastolic Blood Pressure 92 mmHg    Ventricular Rate 69 BPM    Atrial Rate 69 BPM    MS Interval 184 ms    QRS Duration 86 ms     ms    QTc 415 ms    P Axis 42 degrees    R AXIS -16 degrees    T Axis 23 degrees    Interpretation ECG       Sinus rhythm  Minimal voltage criteria for LVH, may be normal variant  Borderline ECG  When compared with ECG of 20-MAY-2022 14:54,  No significant change was found  Confirmed by SEE ED PROVIDER NOTE FOR, ECG INTERPRETATION ([id number]),  CARMEL NEW ([id number]) on 6/28/2023 7:22:03 PM           RADIOLOGY:  Radiology reports were independently reviewed and interpreted  CT Abdomen Pelvis w Contrast   Final Result   IMPRESSION:    1.   No acute findings to explain the patient's pain.      2.  Moderate diffuse diverticulosis in the colon but nothing for diverticulitis.           EKG:    Time: 17:05  Rate: 69 bpm  MI: 184 ms  QRS: 86 ms  QTC: 415 ms  Interpretation: Sinus rhythm. Minimal voltage criteria for LVH, may be normal variant. Borderline ECG.  Comparison: Compared to previous from 5/20/22, there is no significant change.    I have independently reviewed and interpreted the EKG(s) documented above.      MEDICATIONS GIVEN IN THE EMERGENCY:  Medications   0.9% sodium chloride BOLUS (0 mLs Intravenous Stopped 6/28/23 0140)   iopamidol (ISOVUE-370) solution 75 mL (75 mLs Intravenous $Given 6/28/23 1639)           NEW PRESCRIPTIONS STARTED AT TODAY'S ER VISIT:  Discharge Medication List as of 6/28/2023  5:48 PM                   FINAL DIAGNOSIS:    ICD-10-CM    1. Abdominal pain, unspecified abdominal location  R10.9                  NAME: Cali Gama  AGE: 78 year old male  YOB: 1945  MRN: [id number]  EVALUATION DATE & TIME: No admission date for patient encounter.    PCP: Urban Barker    ED PROVIDER: Willian Campos M.D.      Wesley RAMOS, am serving as a scribe to document services personally performed by Dr. Willian Campos based on my observation and the provider's statements to me. IWillian MD attest that Wesley Pham is acting in a scribe capacity, has observed my performance of the services and has documented them in accordance with my direction.    Willian Campos M.D.  Emergency Medicine  [facility] EMERGENCY ROOM  [address] [facility] [address]  [phone]  Dept: [phone]  6/28/2023     Willian Campos MD  06/28/23 0746

## 2023-07-21 ENCOUNTER — TRANSFERRED RECORDS (OUTPATIENT)
Dept: HEALTH INFORMATION MANAGEMENT | Facility: CLINIC | Age: 78
End: 2023-07-21
Payer: MEDICARE

## 2023-07-25 ENCOUNTER — TRANSFERRED RECORDS (OUTPATIENT)
Dept: HEALTH INFORMATION MANAGEMENT | Facility: CLINIC | Age: 78
End: 2023-07-25
Payer: MEDICARE

## 2023-08-03 ENCOUNTER — OFFICE VISIT (OUTPATIENT)
Dept: FAMILY MEDICINE | Facility: CLINIC | Age: 78
End: 2023-08-03
Payer: MEDICARE

## 2023-08-03 VITALS
TEMPERATURE: 98 F | HEIGHT: 68 IN | HEART RATE: 65 BPM | WEIGHT: 180 LBS | OXYGEN SATURATION: 97 % | BODY MASS INDEX: 27.28 KG/M2 | SYSTOLIC BLOOD PRESSURE: 134 MMHG | RESPIRATION RATE: 17 BRPM | DIASTOLIC BLOOD PRESSURE: 82 MMHG

## 2023-08-03 DIAGNOSIS — N40.1 BPH WITH URINARY OBSTRUCTION: ICD-10-CM

## 2023-08-03 DIAGNOSIS — Z00.00 ENCOUNTER FOR MEDICARE ANNUAL WELLNESS EXAM: Primary | ICD-10-CM

## 2023-08-03 DIAGNOSIS — E78.00 HYPERCHOLESTEROLEMIA: ICD-10-CM

## 2023-08-03 DIAGNOSIS — K21.9 GASTROESOPHAGEAL REFLUX DISEASE WITHOUT ESOPHAGITIS: ICD-10-CM

## 2023-08-03 DIAGNOSIS — E66.3 OVERWEIGHT: ICD-10-CM

## 2023-08-03 DIAGNOSIS — N13.8 BPH WITH URINARY OBSTRUCTION: ICD-10-CM

## 2023-08-03 DIAGNOSIS — N18.31 STAGE 3A CHRONIC KIDNEY DISEASE (H): ICD-10-CM

## 2023-08-03 DIAGNOSIS — G56.02 CARPAL TUNNEL SYNDROME OF LEFT WRIST: ICD-10-CM

## 2023-08-03 LAB
ANION GAP SERPL CALCULATED.3IONS-SCNC: 9 MMOL/L (ref 7–15)
BUN SERPL-MCNC: 13 MG/DL (ref 8–23)
CALCIUM SERPL-MCNC: 9.4 MG/DL (ref 8.8–10.2)
CHLORIDE SERPL-SCNC: 106 MMOL/L (ref 98–107)
CHOLEST SERPL-MCNC: 238 MG/DL
CREAT SERPL-MCNC: 1.31 MG/DL (ref 0.67–1.17)
CREAT UR-MCNC: 83.1 MG/DL
DEPRECATED HCO3 PLAS-SCNC: 26 MMOL/L (ref 22–29)
GFR SERPL CREATININE-BSD FRML MDRD: 55 ML/MIN/1.73M2
GLUCOSE SERPL-MCNC: 101 MG/DL (ref 70–99)
HDLC SERPL-MCNC: 52 MG/DL
HOLD SPECIMEN: NORMAL
LDLC SERPL CALC-MCNC: 168 MG/DL
MICROALBUMIN UR-MCNC: <12 MG/L
MICROALBUMIN/CREAT UR: NORMAL MG/G{CREAT}
NONHDLC SERPL-MCNC: 186 MG/DL
POTASSIUM SERPL-SCNC: 5 MMOL/L (ref 3.4–5.3)
SODIUM SERPL-SCNC: 141 MMOL/L (ref 136–145)
TRIGL SERPL-MCNC: 90 MG/DL

## 2023-08-03 PROCEDURE — 82043 UR ALBUMIN QUANTITATIVE: CPT | Performed by: FAMILY MEDICINE

## 2023-08-03 PROCEDURE — 36415 COLL VENOUS BLD VENIPUNCTURE: CPT | Performed by: FAMILY MEDICINE

## 2023-08-03 PROCEDURE — 80048 BASIC METABOLIC PNL TOTAL CA: CPT | Performed by: FAMILY MEDICINE

## 2023-08-03 PROCEDURE — G0439 PPPS, SUBSEQ VISIT: HCPCS | Performed by: FAMILY MEDICINE

## 2023-08-03 PROCEDURE — 80061 LIPID PANEL: CPT | Performed by: FAMILY MEDICINE

## 2023-08-03 PROCEDURE — 99213 OFFICE O/P EST LOW 20 MIN: CPT | Mod: 25 | Performed by: FAMILY MEDICINE

## 2023-08-03 PROCEDURE — 82570 ASSAY OF URINE CREATININE: CPT | Performed by: FAMILY MEDICINE

## 2023-08-03 ASSESSMENT — ENCOUNTER SYMPTOMS
CONSTIPATION: 0
HEARTBURN: 0
MYALGIAS: 0
WEAKNESS: 0
PARESTHESIAS: 0
FEVER: 0
HEADACHES: 0
HEMATOCHEZIA: 0
DIZZINESS: 0
DYSURIA: 0
COUGH: 0
NERVOUS/ANXIOUS: 0
SHORTNESS OF BREATH: 0
ABDOMINAL PAIN: 0
ARTHRALGIAS: 0
JOINT SWELLING: 0
FREQUENCY: 0
HEMATURIA: 0
PALPITATIONS: 0
NAUSEA: 0
SORE THROAT: 0
EYE PAIN: 0
CHILLS: 0
DIARRHEA: 0

## 2023-08-03 ASSESSMENT — PAIN SCALES - GENERAL: PAINLEVEL: NO PAIN (0)

## 2023-08-03 ASSESSMENT — ACTIVITIES OF DAILY LIVING (ADL): CURRENT_FUNCTION: NO ASSISTANCE NEEDED

## 2023-08-03 NOTE — PATIENT INSTRUCTIONS
Patient Education   Personalized Prevention Plan  You are due for the preventive services outlined below.  Your care team is available to assist you in scheduling these services.  If you have already completed any of these items, please share that information with your care team to update in your medical record.  Health Maintenance Due   Topic Date Due     Kidney Microalbumin Urine Test  Never done     ANNUAL REVIEW OF HM ORDERS  Never done     Zoster (Shingles) Vaccine (1 of 2) Never done     Cholesterol Lab  09/11/2020     COVID-19 Vaccine (3 - Moderna series) 04/24/2021     Diptheria Tetanus Pertussis (DTAP/TDAP/TD) Vaccine (3 - Td or Tdap) 05/21/2023     Learning About Dietary Guidelines  What are the Dietary Guidelines for Americans?     Dietary Guidelines for Americans provide tips for eating well and staying healthy. This helps reduce the risk for long-term (chronic) diseases.  These guidelines recommend that you:  Eat and drink the right amount for you. The [place] government's food guide is called MyPlate. It can help you make your own well-balanced eating plan.  Try to balance your eating with your activity. This helps you stay at a healthy weight.  Drink alcohol in moderation, if at all.  Limit foods high in salt, saturated fat, trans fat, and added sugar.  These guidelines are from the U.S. Department of Agriculture and the U.S. Department of Health and Human Services. They are updated every 5 years.  What is MyPlate?  MyPlate is the [place] government's food guide. It can help you make your own well-balanced eating plan. A balanced eating plan means that you eat enough, but not too much, and that your food gives you the nutrients you need to stay healthy.  MyPlate focuses on eating plenty of whole grains, fruits, and vegetables, and on limiting fat and sugar. It is available online at www.ChooseMyPlate.gov.  How can you get started?  If you're trying to eat healthier, you can slowly change your eating habits  "over time. You don't have to make big changes all at once. Start by adding one or two healthy foods to your meals each day.  Grains  Choose whole-grain breads and cereals and whole-wheat pasta and whole-grain crackers.  Vegetables  Eat a variety of vegetables every day. They have lots of nutrients and are part of a heart-healthy diet.  Fruits  Eat a variety of fruits every day. Fruits contain lots of nutrients. Choose fresh fruit instead of fruit juice.  Protein foods  Choose fish and lean poultry more often. Eat red meat and fried meats less often. Dried beans, tofu, and nuts are also good sources of protein.  Dairy  Choose low-fat or fat-free products from this food group. If you have problems digesting milk, try eating cheese or yogurt instead.  Fats and oils  Limit fats and oils if you're trying to cut calories. Choose healthy fats when you cook. These include canola oil and olive oil.  Where can you learn more?  Go to https://www.[employer].net/patiented  Enter D676 in the search box to learn more about \"Learning About Dietary Guidelines.\"  Current as of: March 1, 2023               Content Version: 13.7    5107-8535 [employer].   Care instructions adapted under license by your healthcare professional. If you have questions about a medical condition or this instruction, always ask your healthcare professional. [employer] disclaims any warranty or liability for your use of this information.         "

## 2023-08-03 NOTE — PROGRESS NOTES
SUBJECTIVE:     Cali is a 78 year old who presents for Preventive Visit.      8/3/2023     8:34 AM   Additional Questions   Roomed by        Are you in the first 12 months of your Medicare coverage?  No      Annual wellness visit completed.  Risk questionnaire reviewed.  Suboptimal diet.  Patient would like to gain additional weight.  Patient's wife is concerned about how he does that feeding himself with poor choices.  Patient has had elevated cholesterol historically.  Diet controlled.  CKD stage IIIa.  Recent ER assessment a couple weeks ago with CT abdomen pelvis with contrast without obvious etiology for right upper quadrant discomfort.  No recurrent issues.  Diverticulosis without diverticulitis.  History of BPH with urinary obstruction and has had TURP procedure in 2018.  Gets up a couple times per night typically.  Famotidine 10 mg at bedtime for reflux management.  Left carpal tunnel syndrome.  Declines further intervention at this time despite moderate to severe findings per patient's wife.  Comprehensive review of systems as above otherwise all negative.      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 Navarro... [facility]   Lives in [place] x 6 months of the year   Hobbies: writing and weight lifting         Healthy Habits:     In general, how would you rate your overall health?  Good    Frequency of exercise:  6-7 days/week    " Duration of exercise:  45-60 minutes    Do you usually eat at least 4 servings of fruit and vegetables a day, include whole grains    & fiber and avoid regularly eating high fat or \"junk\" foods?  No    Taking medications regularly:  Yes    Ability to successfully perform activities of daily living:  No assistance needed    Home Safety:  No safety concerns identified    Hearing Impairment:  No hearing concerns    In the past 6 months, have you been bothered by leaking of urine?  No    In general, how would you rate your overall mental or emotional health?  Excellent    Additional concerns today:  No        Have you ever done Advance Care Planning? (For example, a Health Directive, POLST, or a discussion with a medical provider or your loved ones about your wishes): No, advance care planning information given to patient to review.  Advanced care planning was discussed at today's visit.       Fall risk  Fallen 2 or more times in the past year?: No  Any fall with injury in the past year?: No    Cognitive Screening   1) Repeat 3 items (Leader, Season, Table)    2) Clock draw: NORMAL  3) 3 item recall: Recalls 3 objects  Results: 3 items recalled: COGNITIVE IMPAIRMENT LESS LIKELY    Mini-CogTM Copyright S Mandi. Licensed by the author for use in [facility]; reprinted with permission (soob@[facility]). All rights reserved.      Do you have sleep apnea, excessive snoring or daytime drowsiness? : no    Reviewed and updated as needed this visit by clinical staff   Tobacco  Allergies  Meds  Problems             Reviewed and updated as needed this visit by Provider    Allergies  Meds  Problems            Social History     Tobacco Use    Smoking status: Former    Smokeless tobacco: Never   Substance Use Topics    Alcohol use: Yes     Alcohol/week: 2.0 standard drinks of alcohol     Comment: 1-2 / week             8/3/2023     8:27 AM   Alcohol Use   Prescreen: >3 drinks/day or >7 drinks/week? No     Do you " have a current opioid prescription? No  Do you use any other controlled substances or medications that are not prescribed by a provider? None              Current providers sharing in care for this patient include:   Patient Care Team:  Urban Barker MD as PCP - General (Family Medicine)  Renato Cary DPM as Assigned Surgical Provider  Urban Barker MD as Assigned PCP    The following health maintenance items are reviewed in Epic and correct as of today:  Health Maintenance   Topic Date Due    MICROALBUMIN  Never done    ZOSTER IMMUNIZATION (1 of 2) Never done    LIPID  09/11/2020    COVID-19 Vaccine (3 - Moderna series) 04/24/2021    DTAP/TDAP/TD IMMUNIZATION (3 - Td or Tdap) 05/21/2023    INFLUENZA VACCINE (1) 09/01/2023    BMP  06/28/2024    HEMOGLOBIN  06/28/2024    MEDICARE ANNUAL WELLNESS VISIT  08/03/2024    ANNUAL REVIEW OF HM ORDERS  08/03/2024    FALL RISK ASSESSMENT  08/03/2024    ADVANCE CARE PLANNING  08/03/2028    PHQ-2 (once per calendar year)  Completed    Pneumococcal Vaccine: 65+ Years  Completed    URINALYSIS  Completed    IPV IMMUNIZATION  Aged Out    MENINGITIS IMMUNIZATION  Aged Out    LUNG CANCER SCREENING  Discontinued     Lab work is in process  Labs reviewed in EPIC  BP Readings from Last 3 Encounters:   08/03/23 134/82   06/28/23 (!) 189/104   04/25/23 130/80    Wt Readings from Last 3 Encounters:   08/03/23 81.6 kg (180 lb)   06/28/23 81.6 kg (180 lb)   04/25/23 83.9 kg (185 lb)                  Patient Active Problem List   Diagnosis    Irritable Bowel Syndrome    BPH with urinary obstruction    Hypertrophy Of Breast    Osteoarthritis Of The Knee    Joint Pain, Localized In The Left Shoulder    Blood Pressure Isolated Elevated    GERD (gastroesophageal reflux disease)    Thyroid nodule    Benign prostatic hyperplasia with lower urinary tract symptoms, symptom details unspecified    Chronic kidney disease, stage 3 (H)    Equinus contracture of ankle    Plantar fasciitis  of right foot    Osteoarthritis of ankle and foot, unspecified laterality    Hammertoes of both feet    Keratoma    Tailor's bunion of both feet    Carpal tunnel syndrome of left wrist    Overweight    Hypercholesterolemia     Past Surgical History:   Procedure Laterality Date    CHOLECYSTECTOMY      HC TRANSURETHRAL ELEC-SURG PROSTATECTOM N/A 9/15/2020    Procedure: CYSTOSCOPY, BIPOLAR TRANSURETHRAL RESECTION OF PROSTATE;  Surgeon: Mario Reynolds MD;  Location: [facility] [place];  Service: Urology    JOINT REPLACEMENT         Social History     Tobacco Use    Smoking status: Former    Smokeless tobacco: Never   Substance Use Topics    Alcohol use: Yes     Alcohol/week: 2.0 standard drinks of alcohol     Comment: 1-2 / week     Family History   Problem Relation Age of Onset    Dementia Mother     Cancer Father         bladder    Aneurysm Father     No Known Problems Maternal Grandmother     No Known Problems Maternal Grandfather     No Known Problems Paternal Grandmother     No Known Problems Paternal Grandfather     No Known Problems Sister     No Known Problems Daughter     No Known Problems Maternal Aunt     No Known Problems Paternal Aunt     Hereditary Breast and Ovarian Cancer Syndrome No family hx of     Breast Cancer No family hx of     Colon Cancer No family hx of     Endometrial Cancer No family hx of     Ovarian Cancer No family hx of          Current Outpatient Medications   Medication Sig Dispense Refill    famotidine (FOR PEPCID) 10 MG tablet Take 10 mg by mouth every evening       fluocinonide (LIDEX) 0.05 % external solution APPLY TO THE AFFECTED AREAS ON SCALP ONCE AT BEDTIME. TAPER WHEN CLEAR      ketoconazole (NIZORAL) 2 % external shampoo APPLY TO SCALP 3 TIMES A WEEK AS NEEDED AND RINSE WELL       Allergies   Allergen Reactions    Ciprofloxacin Other (See Comments)     tendonitis    Morphine Unknown     Patient states that he cannot urinate after taking morphine.    Vitamin A Unknown      "Paba Derivatives       Needs Shingrix, COVID and Tdap through local phamacy        Review of Systems   Constitutional:  Negative for chills and fever.   HENT:  Positive for congestion. Negative for ear pain, hearing loss and sore throat.    Eyes:  Negative for pain and visual disturbance.   Respiratory:  Negative for cough and shortness of breath.    Cardiovascular:  Negative for chest pain, palpitations and peripheral edema.   Gastrointestinal:  Negative for abdominal pain, constipation, diarrhea, heartburn, hematochezia and nausea.   Genitourinary:  Positive for impotence. Negative for dysuria, frequency, genital sores, hematuria, penile discharge and urgency.   Musculoskeletal:  Negative for arthralgias, joint swelling and myalgias.   Skin:  Negative for rash.   Neurological:  Negative for dizziness, weakness, headaches and paresthesias.   Psychiatric/Behavioral:  Negative for mood changes. The patient is not nervous/anxious.      Constitutional, HEENT, cardiovascular, pulmonary, GI, , musculoskeletal, neuro, skin, endocrine and psych systems are negative, except as otherwise noted.    OBJECTIVE:   /82   Pulse 65   Temp 98  F (36.7  C)   Resp 17   Ht 1.727 m (5' 8\")   Wt 81.6 kg (180 lb)   SpO2 97%   BMI 27.37 kg/m   Estimated body mass index is 27.37 kg/m  as calculated from the following:    Height as of this encounter: 1.727 m (5' 8\").    Weight as of this encounter: 81.6 kg (180 lb).    Physical Exam  GENERAL: healthy, alert and no distress  EYES: Eyes grossly normal to inspection, PERRL and conjunctivae and sclerae normal  HENT: ear canals and TM's normal, nose and mouth without ulcers or lesions  NECK: no adenopathy, no asymmetry, masses, or scars and thyroid normal to palpation  RESP: lungs clear to auscultation - no rales, rhonchi or wheezes  CV: regular rate and rhythm, normal S1 S2, no S3 or S4, no murmur, click or rub, no peripheral edema and peripheral pulses strong  ABDOMEN: soft, " nontender, no hepatosplenomegaly, no masses and bowel sounds normal   (male): normal male genitalia without lesions or urethral discharge, no hernia  RECTAL: normal sphincter tone, no rectal masses, prostate normal size, smooth, nontender without nodules or masses  MS: no gross musculoskeletal defects noted, no edema  SKIN: no suspicious lesions or rashes  NEURO: Normal strength and tone, mentation intact and speech normal  PSYCH: mentation appears normal, affect normal/bright    Diagnostic Test Results:  Labs reviewed in Epic  Results for orders placed or performed in visit on 08/03/23 (from the past 24 hour(s))   Extra Tube    Narrative    The following orders were created for panel order Extra Tube.  Procedure                               Abnormality         Status                     ---------                               -----------         ------                     Extra Purple Top Tube[[id number]]                            In process                   Please view results for these tests on the individual orders.         EXAM: CT ABDOMEN PELVIS W CONTRAST  LOCATION: [facility]  DATE: 6/28/2023     INDICATION: epigastric pain  COMPARISON: None.  TECHNIQUE: CT scan of the abdomen and pelvis was performed following injection of IV contrast. Multiplanar reformats were obtained. Dose reduction techniques were used.  CONTRAST: ISOVUE 370 75 mL     FINDINGS:   LOWER CHEST: Normal.     HEPATOBILIARY: Gallbladder removed. Tiny amount of air within the intrahepatic bile ducts likely from previous sphincterotomy. Normal liver.     PANCREAS: Normal.     SPLEEN: Normal.     ADRENAL GLANDS: Normal.     KIDNEYS/BLADDER: A couple 2 mm nonobstructing stones left kidney. No hydronephrosis on either side. No other significant findings.     BOWEL: Moderate diverticulosis throughout the colon most prominent in the sigmoid colon. No acute inflammatory change. Normal appendix.     LYMPH NODES:  Normal.     VASCULATURE: Unremarkable.     PELVIC ORGANS: Moderate prostatic hypertrophy.     MUSCULOSKELETAL: Normal.                                                                      IMPRESSION:   1.  No acute findings to explain the patient's pain.     2.  Moderate diffuse diverticulosis in the colon but nothing for diverticulitis.      ASSESSMENT / PLAN:     Encounter for Medicare annual wellness exam  Annual wellness visit completed.  Risk questionnaire reviewed in detail.  Suboptimal diet.  Annual wellness visits to continue.  Information for healthcare directives provided.  Consideration for Shingrix immunization series and tetanus booster to be completed through local pharmacy.  Will receive COVID vaccination this fall once updated vaccine available.  - Extra Tube  - Extra Tube    Hypercholesterolemia  Hypercholesterolemia historically and will update lipid cascade today with weight goal remaining less than 175 pounds ideally.  Patient in fact however would like to gain weight.  Healthy choices were discussed.  Resistance training continuing.    - Lipid panel reflex to direct LDL Non-fasting  - Lipid panel reflex to direct LDL Non-fasting  - Extra Tube  - Extra Tube    Stage 3a chronic kidney disease (H)  History of CKD stage IIIa.  Recent CT with contrast and will update base metabolic panel to ensure stable renal function.  Ensure adequate hydration.  Microalbumin screen to be completed.  - Albumin Random Urine Quantitative with Creat Ratio  - Basic metabolic panel  - Basic metabolic panel  - Extra Tube  - Albumin Random Urine Quantitative with Creat Ratio  - Extra Tube    Overweight  Weight goal remaining less than 175 pounds ideally.  - Extra Tube  - Extra Tube    BPH with urinary obstruction  History of BPH with urinary obstruction status post TURP procedure historically with normal prostatic exam noted today.  - Extra Tube  - Extra Tube    Gastroesophageal reflux disease without  "esophagitis  Gastroesophageal reflux with OTC famotidine 10 mg at bedtime with benefits.  - Extra Tube  - Extra Tube    Carpal tunnel syndrome of left wrist  Describe moderate to severe left wrist carpal tunnel syndrome however patient declines further intervention.  Declines wrist bracing at night.  - Extra Tube  - Extra Tube       Patient has been advised of split billing requirements and indicates understanding: Yes      COUNSELING:  Reviewed preventive health counseling, as reflected in patient instructions       Regular exercise       Healthy diet/nutrition       Vision screening       Hearing screening       Dental care       Bladder control       Fall risk prevention       Colon cancer screening       Prostate cancer screening      BMI:   Estimated body mass index is 27.37 kg/m  as calculated from the following:    Height as of this encounter: 1.727 m (5' 8\").    Weight as of this encounter: 81.6 kg (180 lb).         He reports that he has quit smoking. He has never used smokeless tobacco.      Appropriate preventive services were discussed with this patient, including applicable screening as appropriate for cardiovascular disease, diabetes, osteopenia/osteoporosis, and glaucoma.  As appropriate for age/gender, discussed screening for colorectal cancer, prostate cancer, breast cancer, and cervical cancer. Checklist reviewing preventive services available has been given to the patient.    Reviewed patients plan of care and provided an AVS. The Basic Care Plan (routine screening as documented in Health Maintenance) for Cali meets the Care Plan requirement. This Care Plan has been established and reviewed with the Patient and spouse.          Urban Barker MD  [facility]    Identified Health Risks:  I have reviewed Opioid Use Disorder and Substance Use Disorder risk factors and made any needed referrals. The patient was counseled and encouraged to consider modifying their diet and " eating habits. He was provided with information on recommended healthy diet options.

## 2024-02-20 ENCOUNTER — TELEPHONE (OUTPATIENT)
Dept: VASCULAR SURGERY | Facility: CLINIC | Age: 79
End: 2024-02-20
Payer: MEDICARE

## 2024-02-20 DIAGNOSIS — L57.0 KERATOMA: Primary | ICD-10-CM

## 2024-02-20 NOTE — TELEPHONE ENCOUNTER
Hello,    I'm with ortho con.  Pt of Dr. Cary is seeking another inj by [facility] in [place].  Pt declined Sports med.   Please put in an order or call the pt to discuss.  They will schedule towards the end of May.

## 2024-02-20 NOTE — TELEPHONE ENCOUNTER
Writer spoke with pt, he would like another steroid infection done by [facility] Mayfield in [place] with interventional radiology. Order placed. Per Dr. Cary OK given and pt does not need to see him.

## 2024-03-27 ENCOUNTER — TRANSFERRED RECORDS (OUTPATIENT)
Dept: HEALTH INFORMATION MANAGEMENT | Facility: CLINIC | Age: 79
End: 2024-03-27
Payer: MEDICARE

## 2024-05-23 ENCOUNTER — TRANSFERRED RECORDS (OUTPATIENT)
Dept: HEALTH INFORMATION MANAGEMENT | Facility: CLINIC | Age: 79
End: 2024-05-23
Payer: MEDICARE

## 2024-05-28 ENCOUNTER — TRANSFERRED RECORDS (OUTPATIENT)
Dept: HEALTH INFORMATION MANAGEMENT | Facility: CLINIC | Age: 79
End: 2024-05-28
Payer: MEDICARE

## 2024-05-29 ENCOUNTER — TRANSFERRED RECORDS (OUTPATIENT)
Dept: HEALTH INFORMATION MANAGEMENT | Facility: CLINIC | Age: 79
End: 2024-05-29
Payer: MEDICARE

## 2024-06-06 ENCOUNTER — TRANSFERRED RECORDS (OUTPATIENT)
Dept: HEALTH INFORMATION MANAGEMENT | Facility: CLINIC | Age: 79
End: 2024-06-06
Payer: MEDICARE

## 2024-06-13 ENCOUNTER — HOSPITAL ENCOUNTER (OUTPATIENT)
Dept: RADIOLOGY | Facility: CLINIC | Age: 79
Discharge: HOME | End: 2024-06-13
Attending: PODIATRIST | Admitting: PODIATRIST
Payer: MEDICARE

## 2024-06-13 DIAGNOSIS — L57.0 KERATOMA: ICD-10-CM

## 2024-06-13 PROCEDURE — 250N000011 HC RX IP 250 OP 636: Mod: JZ | Performed by: PODIATRIST

## 2024-06-13 PROCEDURE — 20605 DRAIN/INJ JOINT/BURSA W/O US: CPT | Mod: LT

## 2024-06-13 RX ORDER — TRIAMCINOLONE ACETONIDE 40 MG/ML
40 INJECTION, SUSPENSION INTRA-ARTICULAR; INTRAMUSCULAR ONCE
Status: COMPLETED | OUTPATIENT
Start: 2024-06-13 | End: 2024-06-13

## 2024-06-13 RX ORDER — BUPIVACAINE HYDROCHLORIDE 2.5 MG/ML
3 INJECTION, SOLUTION EPIDURAL; INFILTRATION; INTRACAUDAL ONCE
Status: COMPLETED | OUTPATIENT
Start: 2024-06-13 | End: 2024-06-13

## 2024-06-13 RX ORDER — IOPAMIDOL 612 MG/ML
2 INJECTION, SOLUTION INTRATHECAL ONCE
Status: COMPLETED | OUTPATIENT
Start: 2024-06-13 | End: 2024-06-13

## 2024-06-13 RX ADMIN — TRIAMCINOLONE ACETONIDE 40 MG: 40 INJECTION, SUSPENSION INTRA-ARTICULAR; INTRAMUSCULAR at 11:53

## 2024-06-13 RX ADMIN — BUPIVACAINE HYDROCHLORIDE 7.5 MG: 2.5 INJECTION, SOLUTION EPIDURAL; INFILTRATION; INTRACAUDAL; PERINEURAL at 11:52

## 2024-06-13 RX ADMIN — IOPAMIDOL 2 ML: 612 INJECTION, SOLUTION INTRAVENOUS at 11:52

## 2024-06-27 ENCOUNTER — TRANSFERRED RECORDS (OUTPATIENT)
Dept: HEALTH INFORMATION MANAGEMENT | Facility: CLINIC | Age: 79
End: 2024-06-27
Payer: MEDICARE

## 2024-09-29 ENCOUNTER — HEALTH MAINTENANCE LETTER (OUTPATIENT)
Age: 79
End: 2024-09-29

## 2024-10-01 ENCOUNTER — TRANSFERRED RECORDS (OUTPATIENT)
Dept: HEALTH INFORMATION MANAGEMENT | Facility: CLINIC | Age: 79
End: 2024-10-01
Payer: MEDICARE

## 2024-11-20 ENCOUNTER — TRANSFERRED RECORDS (OUTPATIENT)
Dept: HEALTH INFORMATION MANAGEMENT | Facility: CLINIC | Age: 79
End: 2024-11-20
Payer: MEDICARE

## 2025-01-31 ENCOUNTER — TRANSFERRED RECORDS (OUTPATIENT)
Dept: HEALTH INFORMATION MANAGEMENT | Facility: CLINIC | Age: 80
End: 2025-01-31
Payer: MEDICARE

## 2025-02-20 ENCOUNTER — TRANSFERRED RECORDS (OUTPATIENT)
Dept: HEALTH INFORMATION MANAGEMENT | Facility: CLINIC | Age: 80
End: 2025-02-20

## 2025-03-20 ENCOUNTER — TRANSFERRED RECORDS (OUTPATIENT)
Dept: HEALTH INFORMATION MANAGEMENT | Facility: CLINIC | Age: 80
End: 2025-03-20

## 2025-04-16 ENCOUNTER — TRANSFERRED RECORDS (OUTPATIENT)
Dept: HEALTH INFORMATION MANAGEMENT | Facility: CLINIC | Age: 80
End: 2025-04-16

## 2025-04-24 ENCOUNTER — TELEPHONE (OUTPATIENT)
Dept: VASCULAR SURGERY | Facility: CLINIC | Age: 80
End: 2025-04-24
Payer: MEDICARE

## 2025-04-24 NOTE — TELEPHONE ENCOUNTER
Spoke with spouse Mira.  Patient has not been seen for 2 years, patient needs an appointment.  Patient scheduled.

## 2025-05-16 ENCOUNTER — TRANSFERRED RECORDS (OUTPATIENT)
Dept: HEALTH INFORMATION MANAGEMENT | Facility: CLINIC | Age: 80
End: 2025-05-16
Payer: MEDICARE

## 2025-05-20 ENCOUNTER — OFFICE VISIT (OUTPATIENT)
Dept: PODIATRY | Facility: CLINIC | Age: 80
End: 2025-05-20
Payer: MEDICARE

## 2025-05-20 VITALS — BODY MASS INDEX: 27.67 KG/M2 | WEIGHT: 182 LBS

## 2025-05-20 DIAGNOSIS — M20.41 HAMMERTOE, BILATERAL: ICD-10-CM

## 2025-05-20 DIAGNOSIS — M21.621 TAILOR'S BUNION OF BOTH FEET: ICD-10-CM

## 2025-05-20 DIAGNOSIS — M21.622 TAILOR'S BUNION OF BOTH FEET: ICD-10-CM

## 2025-05-20 DIAGNOSIS — M19.072 DJD (DEGENERATIVE JOINT DISEASE), ANKLE AND FOOT, LEFT: Primary | ICD-10-CM

## 2025-05-20 DIAGNOSIS — M20.42 HAMMERTOE, BILATERAL: ICD-10-CM

## 2025-05-20 PROCEDURE — 99213 OFFICE O/P EST LOW 20 MIN: CPT | Performed by: PODIATRIST

## 2025-05-20 RX ORDER — LIDOCAINE HYDROCHLORIDE 20 MG/ML
6 INJECTION, SOLUTION INFILTRATION; PERINEURAL ONCE
Status: CANCELLED | OUTPATIENT
Start: 2025-05-20 | End: 2025-05-20

## 2025-05-20 NOTE — LETTER
5/20/2025      Cali Gama  [address]  [place] [address]      Dear Colleague,    Thank you for referring your patient, Cali Gama, to the [facility]. Please see a copy of my visit note below.        FOOT AND ANKLE SURGERY/PODIATRY PROGRESS NOTE        ASSESSMENT:   DJD left midfoot  Keratoma  Hammwilleme  Tailor's bunion      TREATMENT:  DJD left midfoot: I recommend and have referred him for an additional steroid injection with interventional radiology along the left midfoot into the fourth and fifth tarsometatarsal joints.  He did obtain relief from previous injections.      -I also recommend and have referred him for custom orthotics with [facility], patient declines at this time.    Keratoma: Recommend patient continue with Dremel to reduce callus tissue buildup.    -Patient's questions invited and answered.  He was encouraged to call my office with any further questions or concerns.     Renato Cary, TE  [facility]/Foot [facility] Ankle Surgery      HPI: Cali Gama was seen again today for left foot pain.  Patient reports that he would like a referral to interventional radiology for steroid injection to the left midfoot which is greatly assisted with midfoot pain in the past.  He also complains of callus buildup along the plantar fifth MPJ on the right foot.  He is currently using a Dremel tool to reduce the callus buildup.    Past Medical History:   Diagnosis Date     BPH with urinary obstruction      Chronic kidney disease      GERD (gastroesophageal reflux disease) 7/29/2015     Irritable bowel syndrome     Created by Conversion      Osteoarthrosis involving lower leg     Created by Conversion  Replacement Utility updated for latest IMO load     Stage 2 chronic kidney disease 7/29/2015     Thyroid nodule 8/15/2018    right side, 2 cm discovered on 8/15/18       Past Surgical History:   Procedure Laterality Date      CHOLECYSTECTOMY       HC TRANSURETHRAL ELEC-SURG PROSTATECTOM N/A 9/15/2020    Procedure: CYSTOSCOPY, BIPOLAR TRANSURETHRAL RESECTION OF PROSTATE;  Surgeon: Mario Reynolds MD;  [place] [facility] [place];  Service: Urology     JOINT REPLACEMENT         Allergies   Allergen Reactions     Ciprofloxacin Other (See Comments)     tendonitis     Morphine Unknown     Patient states that he cannot urinate after taking morphine.     Vitamin A Unknown     Paba Derivatives         Current Outpatient Medications:      famotidine (FOR PEPCID) 10 MG tablet, Take 10 mg by mouth every evening , Disp: , Rfl:      fluocinonide (LIDEX) 0.05 % external solution, APPLY TO THE AFFECTED AREAS ON SCALP ONCE AT BEDTIME. TAPER WHEN CLEAR, Disp: , Rfl:      ketoconazole (NIZORAL) 2 % external shampoo, APPLY TO SCALP 3 TIMES A WEEK AS NEEDED AND RINSE WELL, Disp: , Rfl:     Family History   Problem Relation Age of Onset     Dementia Mother      Cancer Father         bladder     Aneurysm Father      No Known Problems Maternal Grandmother      No Known Problems Maternal Grandfather      No Known Problems Paternal Grandmother      No Known Problems Paternal Grandfather      No Known Problems Sister      No Known Problems Daughter      No Known Problems Maternal Aunt      No Known Problems Paternal Aunt      Hereditary Breast and Ovarian Cancer Syndrome No family hx of      Breast Cancer No family hx of      Colon Cancer No family hx of      Endometrial Cancer No family hx of      Ovarian Cancer No family hx of        Social History     Socioeconomic History     Marital status:      Spouse name: Not on file     Number of children: Not on file     Years of education: Not on file     Highest education level: Not on file   Occupational History     Not on file   Tobacco Use     Smoking status: Former     Smokeless tobacco: Never   Substance and Sexual Activity     Alcohol use: Yes     Alcohol/week: 2.0 standard drinks of alcohol      Comment: 1-2 / week     Drug use: No     Sexual activity: Not on file   Other Topics Concern     Not on file   Social History Narrative     Not on file     Social Drivers of Health     Financial Resource Strain: Not on file   Food Insecurity: Not on file   Transportation Needs: Not on file   Physical Activity: Not on file   Stress: Not on file   Social Connections: Not on file   Interpersonal Safety: Not on file   Housing Stability: Not on file       10 point Review of Systems is negative except for foot pain which is noted in HPI.     Wt 82.6 kg (182 lb)   BMI 27.67 kg/m      BMI= Body mass index is 27.67 kg/m .    OBJECTIVE:  General appearance: Patient is alert and fully cooperative with history & exam.  No sign of distress is noted during the visit.    Vascular: Posterior tibial pulses are non-palpable.   Dermatologic: Hyperkeratotic tissue x2 plantar fifth MPJ on the right foot.  No erythema bilateral feet.  Neurologic: All epicritic and proprioceptive sensations are grossly intact bilateral.  Musculoskeletal: Contracted digits noted bilateral with mild splaying of digits 3 and 4 left foot.  Prominent metatarsal heads bilaterally.  Mild tailor's bunion bilateral.      Again, thank you for allowing me to participate in the care of your patient.        Sincerely,        Renato Cary DPM    Electronically signed

## 2025-05-20 NOTE — PROGRESS NOTES
FOOT AND ANKLE SURGERY/PODIATRY PROGRESS NOTE        ASSESSMENT:   DJD left midfoot  Keratoma  Luli Devine's bunion      TREATMENT:  DJD left midfoot: I recommend and have referred him for an additional steroid injection with interventional radiology along the left midfoot into the fourth and fifth tarsometatarsal joints.  He did obtain relief from previous injections.      -I also recommend and have referred him for custom orthotics with [facility], patient declines at this time.    Keratoma: Recommend patient continue with Dremel to reduce callus tissue buildup.    -Patient's questions invited and answered.  He was encouraged to call my office with any further questions or concerns.     Renato Cary DPM  [facility] Podiatry/Foot & Ankle Surgery      HPI: Cali Gama was seen again today for left foot pain.  Patient reports that he would like a referral to interventional radiology for steroid injection to the left midfoot which is greatly assisted with midfoot pain in the past.  He also complains of callus buildup along the plantar fifth MPJ on the right foot.  He is currently using a Dremel tool to reduce the callus buildup.    Past Medical History:   Diagnosis Date    BPH with urinary obstruction     Chronic kidney disease     GERD (gastroesophageal reflux disease) 7/29/2015    Irritable bowel syndrome     Created by Conversion     Osteoarthrosis involving lower leg     Created by Conversion  Replacement Utility updated for latest IMO load    Stage 2 chronic kidney disease 7/29/2015    Thyroid nodule 8/15/2018    right side, 2 cm discovered on 8/15/18       Past Surgical History:   Procedure Laterality Date    CHOLECYSTECTOMY      HC TRANSURETHRAL ELEC-SURG PROSTATECTOM N/A 9/15/2020    Procedure: CYSTOSCOPY, BIPOLAR TRANSURETHRAL RESECTION OF PROSTATE;  Surgeon: Mario Reynolds MD;  [place] [facility] [place]  Service: Urology    JOINT REPLACEMENT          Allergies   Allergen Reactions    Ciprofloxacin Other (See Comments)     tendonitis    Morphine Unknown     Patient states that he cannot urinate after taking morphine.    Vitamin A Unknown     Paba Derivatives         Current Outpatient Medications:     famotidine (FOR PEPCID) 10 MG tablet, Take 10 mg by mouth every evening , Disp: , Rfl:     fluocinonide (LIDEX) 0.05 % external solution, APPLY TO THE AFFECTED AREAS ON SCALP ONCE AT BEDTIME. TAPER WHEN CLEAR, Disp: , Rfl:     ketoconazole (NIZORAL) 2 % external shampoo, APPLY TO SCALP 3 TIMES A WEEK AS NEEDED AND RINSE WELL, Disp: , Rfl:     Family History   Problem Relation Age of Onset    Dementia Mother     Cancer Father         bladder    Aneurysm Father     No Known Problems Maternal Grandmother     No Known Problems Maternal Grandfather     No Known Problems Paternal Grandmother     No Known Problems Paternal Grandfather     No Known Problems Sister     No Known Problems Daughter     No Known Problems Maternal Aunt     No Known Problems Paternal Aunt     Hereditary Breast and Ovarian Cancer Syndrome No family hx of     Breast Cancer No family hx of     Colon Cancer No family hx of     Endometrial Cancer No family hx of     Ovarian Cancer No family hx of        Social History     Socioeconomic History    Marital status:      Spouse name: Not on file    Number of children: Not on file    Years of education: Not on file    Highest education level: Not on file   Occupational History    Not on file   Tobacco Use    Smoking status: Former    Smokeless tobacco: Never   Substance and Sexual Activity    Alcohol use: Yes     Alcohol/week: 2.0 standard drinks of alcohol     Comment: 1-2 / week    Drug use: No    Sexual activity: Not on file   Other Topics Concern    Not on file   Social History Narrative    Not on file     Social Drivers of Health     Financial Resource Strain: Not on file   Food Insecurity: Not on file   Transportation Needs: Not on file    Physical Activity: Not on file   Stress: Not on file   Social Connections: Not on file   Interpersonal Safety: Not on file   Housing Stability: Not on file       10 point Review of Systems is negative except for foot pain which is noted in HPI.     Wt 82.6 kg (182 lb)   BMI 27.67 kg/m      BMI= Body mass index is 27.67 kg/m .    OBJECTIVE:  General appearance: Patient is alert and fully cooperative with history & exam.  No sign of distress is noted during the visit.    Vascular: Posterior tibial pulses are non-palpable.   Dermatologic: Hyperkeratotic tissue x2 plantar fifth MPJ on the right foot.  No erythema bilateral feet.  Neurologic: All epicritic and proprioceptive sensations are grossly intact bilateral.  Musculoskeletal: Contracted digits noted bilateral with mild splaying of digits 3 and 4 left foot.  Prominent metatarsal heads bilaterally.  Mild tailor's bunion bilateral.

## 2025-05-29 SDOH — HEALTH STABILITY: PHYSICAL HEALTH: ON AVERAGE, HOW MANY DAYS PER WEEK DO YOU ENGAGE IN MODERATE TO STRENUOUS EXERCISE (LIKE A BRISK WALK)?: 0 DAYS

## 2025-05-29 ASSESSMENT — SOCIAL DETERMINANTS OF HEALTH (SDOH): HOW OFTEN DO YOU GET TOGETHER WITH FRIENDS OR RELATIVES?: ONCE A WEEK

## 2025-06-03 ENCOUNTER — RESULTS FOLLOW-UP (OUTPATIENT)
Dept: FAMILY MEDICINE | Facility: CLINIC | Age: 80
End: 2025-06-03

## 2025-06-03 ENCOUNTER — OFFICE VISIT (OUTPATIENT)
Dept: FAMILY MEDICINE | Facility: CLINIC | Age: 80
End: 2025-06-03
Payer: MEDICARE

## 2025-06-03 VITALS
WEIGHT: 180 LBS | OXYGEN SATURATION: 95 % | HEIGHT: 68 IN | TEMPERATURE: 98.2 F | RESPIRATION RATE: 16 BRPM | BODY MASS INDEX: 27.28 KG/M2 | DIASTOLIC BLOOD PRESSURE: 84 MMHG | SYSTOLIC BLOOD PRESSURE: 132 MMHG | HEART RATE: 80 BPM

## 2025-06-03 DIAGNOSIS — R10.10 UPPER ABDOMINAL PAIN: ICD-10-CM

## 2025-06-03 DIAGNOSIS — Z00.00 MEDICARE ANNUAL WELLNESS VISIT, SUBSEQUENT: Primary | ICD-10-CM

## 2025-06-03 DIAGNOSIS — M70.61 TROCHANTERIC BURSITIS OF BOTH HIPS: ICD-10-CM

## 2025-06-03 DIAGNOSIS — M70.62 TROCHANTERIC BURSITIS OF BOTH HIPS: ICD-10-CM

## 2025-06-03 DIAGNOSIS — E78.00 HYPERCHOLESTEROLEMIA: ICD-10-CM

## 2025-06-03 DIAGNOSIS — N18.31 STAGE 3A CHRONIC KIDNEY DISEASE (H): ICD-10-CM

## 2025-06-03 DIAGNOSIS — E66.3 OVERWEIGHT: ICD-10-CM

## 2025-06-03 DIAGNOSIS — K59.00 CONSTIPATION, UNSPECIFIED CONSTIPATION TYPE: ICD-10-CM

## 2025-06-03 DIAGNOSIS — H35.30 MACULAR DEGENERATION (SENILE) OF RETINA: ICD-10-CM

## 2025-06-03 LAB
ALBUMIN SERPL BCG-MCNC: 4.3 G/DL (ref 3.5–5.2)
ALP SERPL-CCNC: 89 U/L (ref 40–150)
ALT SERPL W P-5'-P-CCNC: 15 U/L (ref 0–70)
ANION GAP SERPL CALCULATED.3IONS-SCNC: 10 MMOL/L (ref 7–15)
AST SERPL W P-5'-P-CCNC: 25 U/L (ref 0–45)
BILIRUB SERPL-MCNC: 0.6 MG/DL
BUN SERPL-MCNC: 19.4 MG/DL (ref 8–23)
CALCIUM SERPL-MCNC: 9.5 MG/DL (ref 8.8–10.4)
CHLORIDE SERPL-SCNC: 107 MMOL/L (ref 98–107)
CHOLEST SERPL-MCNC: 253 MG/DL
CREAT SERPL-MCNC: 1.41 MG/DL (ref 0.67–1.17)
CREAT UR-MCNC: 116 MG/DL
EGFRCR SERPLBLD CKD-EPI 2021: 50 ML/MIN/1.73M2
ERYTHROCYTE [DISTWIDTH] IN BLOOD BY AUTOMATED COUNT: 13.3 % (ref 10–15)
FASTING STATUS PATIENT QL REPORTED: YES
FASTING STATUS PATIENT QL REPORTED: YES
GLUCOSE SERPL-MCNC: 103 MG/DL (ref 70–99)
HCO3 SERPL-SCNC: 24 MMOL/L (ref 22–29)
HCT VFR BLD AUTO: 48.7 % (ref 40–53)
HDLC SERPL-MCNC: 58 MG/DL
HGB BLD-MCNC: 16.1 G/DL (ref 13.3–17.7)
LDLC SERPL CALC-MCNC: 174 MG/DL
LIPASE SERPL-CCNC: 39 U/L (ref 13–60)
MCH RBC QN AUTO: 30.8 PG (ref 26.5–33)
MCHC RBC AUTO-ENTMCNC: 33.1 G/DL (ref 31.5–36.5)
MCV RBC AUTO: 93 FL (ref 78–100)
MICROALBUMIN UR-MCNC: <12 MG/L
MICROALBUMIN/CREAT UR: NORMAL MG/G{CREAT}
NONHDLC SERPL-MCNC: 195 MG/DL
PLATELET # BLD AUTO: 206 10E3/UL (ref 150–450)
POTASSIUM SERPL-SCNC: 4.8 MMOL/L (ref 3.4–5.3)
PROT SERPL-MCNC: 6.9 G/DL (ref 6.4–8.3)
RBC # BLD AUTO: 5.22 10E6/UL (ref 4.4–5.9)
SODIUM SERPL-SCNC: 141 MMOL/L (ref 135–145)
TRIGL SERPL-MCNC: 104 MG/DL
WBC # BLD AUTO: 9.5 10E3/UL (ref 4–11)

## 2025-06-03 PROCEDURE — 85027 COMPLETE CBC AUTOMATED: CPT | Performed by: FAMILY MEDICINE

## 2025-06-03 PROCEDURE — 82570 ASSAY OF URINE CREATININE: CPT | Performed by: FAMILY MEDICINE

## 2025-06-03 PROCEDURE — 82043 UR ALBUMIN QUANTITATIVE: CPT | Performed by: FAMILY MEDICINE

## 2025-06-03 PROCEDURE — 99214 OFFICE O/P EST MOD 30 MIN: CPT | Mod: 25 | Performed by: FAMILY MEDICINE

## 2025-06-03 PROCEDURE — 3079F DIAST BP 80-89 MM HG: CPT | Performed by: FAMILY MEDICINE

## 2025-06-03 PROCEDURE — G0439 PPPS, SUBSEQ VISIT: HCPCS | Performed by: FAMILY MEDICINE

## 2025-06-03 PROCEDURE — 1126F AMNT PAIN NOTED NONE PRSNT: CPT | Performed by: FAMILY MEDICINE

## 2025-06-03 PROCEDURE — G2211 COMPLEX E/M VISIT ADD ON: HCPCS | Performed by: FAMILY MEDICINE

## 2025-06-03 PROCEDURE — 80061 LIPID PANEL: CPT | Performed by: FAMILY MEDICINE

## 2025-06-03 PROCEDURE — 80053 COMPREHEN METABOLIC PANEL: CPT | Performed by: FAMILY MEDICINE

## 2025-06-03 PROCEDURE — 36415 COLL VENOUS BLD VENIPUNCTURE: CPT | Performed by: FAMILY MEDICINE

## 2025-06-03 PROCEDURE — 3075F SYST BP GE 130 - 139MM HG: CPT | Performed by: FAMILY MEDICINE

## 2025-06-03 PROCEDURE — 83690 ASSAY OF LIPASE: CPT | Performed by: FAMILY MEDICINE

## 2025-06-03 ASSESSMENT — PAIN SCALES - GENERAL: PAINLEVEL_OUTOF10: NO PAIN (0)

## 2025-06-03 NOTE — PROGRESS NOTES
Preventive Care Visit  [facility]  Urban Barker MD, Family Medicine  Yefri 3, 2025      Assessment & Plan     Medicare annual wellness visit, subsequent  Annual Wellness Visit completed.  Risk questionaire reviewed in detail.  Appropriate preventive services were discussed with this patient, including applicable screening as appropriate for fall prevention, nutrition, physical activity, tobacco-use cessation, weight loss, and cognition.  Annual Wellness Visits recommended to continue.     Overweight  Overweight status reviewed with weight goal remaining less than 180 pounds with goal weight 175 pounds.    Stage 3a chronic kidney disease (H)  CKD stage IIIa historically and will update microalbumin testing and renal function.  Ensure adequate hydration.  Avoid unnecessary NSAIDs at [facility].  - Albumin Random Urine Quantitative with Creat Ratio  - Comprehensive metabolic panel    Hypercholesterolemia  Hypercholesterolemia.  Lipid cascade updated.  - Lipid panel reflex to direct LDL Fasting    Macular degeneration (senile) of retina  Macular degeneration noted.    Constipation, unspecified constipation type  Constipation.  Using Metamucil.  May utilize twice daily.  May utilize with Shaylee-Colace as needed.    Upper abdominal pain  Upper abdominal pain recently with CT scan in February 2025 while in [place].  Compared to CT scan in 2023 without obvious concern at that time.  Has had GI workup in the past as well.  Update lab assessment today.  Did instruct patient to increase famotidine from potentially 10 mg at bedtime instead up to 20 mg twice daily over next 30 days to see if any significant change otherwise consider upper endoscopy.  - CBC with platelets  - Comprehensive metabolic panel  - Lipase    Trochanteric bursitis of both hips  Trochanteric bursitis bilateral hips.  Has had hip injections previously.    The longitudinal plan of care for the diagnosis(es)/condition(s) as documented were  "addressed during this visit. Due to the added complexity in care, I will continue to support Cali in the subsequent management and with ongoing continuity of care.     Patient has been advised of split billing requirements and indicates understanding: Yes        BMI  Estimated body mass index is 27.57 kg/m  as calculated from the following:    Height as of this encounter: 1.721 m (5' 7.75\").    Weight as of this encounter: 81.6 kg (180 lb).       Counseling  Appropriate preventive services were addressed with this patient via screening, questionnaire, or discussion as appropriate for fall prevention, nutrition, physical activity, Tobacco-use cessation, social engagement, weight loss and cognition.  Checklist reviewing preventive services available has been given to the patient.  Reviewed patient's diet, addressing concerns and/or questions.   He is at risk for psychosocial distress and has been provided with information to reduce risk.           Julia Leiva is a 80 year old, presenting for the following:  Medicare Visit (Pt is fasting)        6/3/2025     8:15 AM   Additional Questions   Roomed by [facility]    Patient seen today for annual wellness visit.  In general doing well.  Has history of hypercholesterolemia.  Diet controlled.  CKD stage IIIa with prior creatinine 1.31 and GFR 55.  BPH with urinary obstruction historically status post TURP procedure in 2018 and continuing to do fine.  Famotidine 10 mg daily for describe reflux.  Uses Metamucil once daily.  May utilize mineral oil 1 tablespoon occasionally if significant worsening.  Patient has intermittent abdominal pain concerns over past 5 years.  Has had thorough workup with gastroenterology as well as prior CT scan in 2013 as well as recent CT scan February 2025 while in [place].  No specific etiology.  Has had gallbladder removal in the past.  Patient is with with his wife Mira.  Sometimes has difficulty with word finding but his wife who " is 10 years younger states that his memory is excellent and is better than hers in fact.  Comprehensive review of systems as above otherwise all negative.      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 Navarro... [facility]   Lives in [place] x 6 months of the year   Hobbies: writing and weight lifting             Advance Care Planning    Discussed advance care planning with patient; however, patient declined at this time.        2025   General Health   How would you rate your overall physical health? (!) FAIR   Feel stress (tense, anxious, or unable to sleep) To some extent   (!) STRESS CONCERN      2025   Nutrition   Diet: Regular (no restrictions)         2025   Exercise   Days per week of moderate/strenous exercise 0 days   (!) EXERCISE CONCERN      2025   Social Factors   Frequency of gathering with friends or relatives Once a week   Worry food won't last until get money to buy more No   Food not last or not have enough money for food? No   Do you have housing? (Housing is defined as stable permanent housing and does not include staying outside in a car, in a tent, in an abandoned building, in an overnight shelter, or couch-surfing.) Yes   Are you worried about losing your housing? No   Lack of transportation? No   Unable to get utilities (heat,electricity)? No         6/3/2025   Fall Risk   Fallen 2 or more times  in the past year? No   Trouble with walking or balance? No          5/29/2025   Activities of Daily Living- Home Safety   Needs help with the following daily activites None of the above   Safety concerns in the home None of the above         5/29/2025   Dental   Dentist two times every year? Yes         5/29/2025   Hearing Screening   Hearing concerns? None of the above         5/29/2025   Driving Risk Screening   Patient/family members have concerns about driving No         5/29/2025   General Alertness/Fatigue Screening   Have you been more tired than usual lately? No         5/29/2025   Urinary Incontinence Screening   Bothered by leaking urine in past 6 months No         Today's PHQ-2 Score:       6/3/2025     7:57 AM   PHQ-2 ( 1999 [employer])   Q1: Little interest or pleasure in doing things 0   Q2: Feeling down, depressed or hopeless 0   PHQ-2 Score 0    Q1: Little interest or pleasure in doing things Not at all   Q2: Feeling down, depressed or hopeless Not at all   PHQ-2 Score 0       Patient-reported           5/29/2025   Substance Use   Alcohol more than 3/day or more than 7/wk No   Do you have a current opioid prescription? No   How severe/bad is pain from 1 to 10? 0/10 (No Pain)   Do you use any other substances recreationally? No     Social History     Tobacco Use    Smoking status: Former    Smokeless tobacco: Never   Substance Use Topics    Alcohol use: Yes     Alcohol/week: 2.0 standard drinks of alcohol     Comment: 1-2 / week    Drug use: No                 Reviewed and updated as needed this visit by Provider                    Past Medical History:   Diagnosis Date    BPH with urinary obstruction     Chronic kidney disease     GERD (gastroesophageal reflux disease) 7/29/2015    Irritable bowel syndrome     Created by Conversion     Osteoarthrosis involving lower leg     Created by Conversion  Replacement Utility updated for latest IMO load    Stage 2 chronic kidney disease 7/29/2015    Thyroid nodule  8/15/2018    right side, 2 cm discovered on 8/15/18     Past Surgical History:   Procedure Laterality Date    CHOLECYSTECTOMY      HC TRANSURETHRAL ELEC-SURG PROSTATECTOM N/A 9/15/2020    Procedure: CYSTOSCOPY, BIPOLAR TRANSURETHRAL RESECTION OF PROSTATE;  Surgeon: Mario Reynolds MD;  Location: [facility];  Service: Urology    JOINT REPLACEMENT       Lab work is in process  Labs reviewed in EPIC  BP Readings from Last 3 Encounters:   06/03/25 132/84   08/03/23 134/82   06/28/23 (!) 189/104    Wt Readings from Last 3 Encounters:   06/03/25 81.6 kg (180 lb)   05/20/25 82.6 kg (182 lb)   08/03/23 81.6 kg (180 lb)                  Patient Active Problem List   Diagnosis    Irritable Bowel Syndrome    BPH with urinary obstruction    Hypertrophy Of Breast    Osteoarthritis Of The Knee    Joint Pain, Localized In The Left Shoulder    Blood Pressure Isolated Elevated    GERD (gastroesophageal reflux disease)    Thyroid nodule    Benign prostatic hyperplasia with lower urinary tract symptoms, symptom details unspecified    Chronic kidney disease, stage 3 (H)    Equinus contracture of ankle    Plantar fasciitis of right foot    Osteoarthritis of ankle and foot, unspecified laterality    Hammertoes of both feet    Keratoma    Tailor's bunion of both feet    Carpal tunnel syndrome of left wrist    Overweight    Hypercholesterolemia    DJD (degenerative joint disease), ankle and foot, left     Past Surgical History:   Procedure Laterality Date    CHOLECYSTECTOMY      HC TRANSURETHRAL ELEC-SURG PROSTATECTOM N/A 9/15/2020    Procedure: CYSTOSCOPY, BIPOLAR TRANSURETHRAL RESECTION OF PROSTATE;  Surgeon: Mario Reynolds MD;  Location: [facility];  Service: Urology    JOINT REPLACEMENT         Social History     Tobacco Use    Smoking status: Former    Smokeless tobacco: Never   Substance Use Topics    Alcohol use: Yes     Alcohol/week: 2.0 standard drinks of alcohol     Comment: 1-2 / week     Family  History   Problem Relation Age of Onset    Dementia Mother     Cancer Father         bladder    Aneurysm Father     No Known Problems Maternal Grandmother     No Known Problems Maternal Grandfather     No Known Problems Paternal Grandmother     No Known Problems Paternal Grandfather     No Known Problems Sister     No Known Problems Daughter     No Known Problems Maternal Aunt     No Known Problems Paternal Aunt     Hereditary Breast and Ovarian Cancer Syndrome No family hx of     Breast Cancer No family hx of     Colon Cancer No family hx of     Endometrial Cancer No family hx of     Ovarian Cancer No family hx of          Current Outpatient Medications   Medication Sig Dispense Refill    famotidine (FOR PEPCID) 10 MG tablet Take 10 mg by mouth every evening       fluocinonide (LIDEX) 0.05 % external solution APPLY TO THE AFFECTED AREAS ON SCALP ONCE AT BEDTIME. TAPER WHEN CLEAR      ketoconazole (NIZORAL) 2 % external shampoo APPLY TO SCALP 3 TIMES A WEEK AS NEEDED AND RINSE WELL       Allergies   Allergen Reactions    Ciprofloxacin Other (See Comments)     tendonitis    Morphine Unknown     Patient states that he cannot urinate after taking morphine.    Vitamin A Unknown     Paba Derivatives     Current providers sharing in care for this patient include:  Patient Care Team:  Urban Barker MD as PCP - General (Family Medicine)  Urban Barker MD as Assigned PCP  Renato Cary DPM as Assigned Surgical Provider    The following health maintenance items are reviewed in Epic and correct as of today:  Health Maintenance   Topic Date Due    ZOSTER VACCINE (1 of 2) Never done    RSV VACCINE (1 - 1-dose 75+ series) Never done    DTAP/TDAP/TD VACCINE (3 - Td or Tdap) 05/21/2023    HEMOGLOBIN  06/28/2024    BMP  08/03/2024    LIPID  08/03/2024    MICROALBUMIN  08/03/2024    COVID-19 VACCINE (3 - 2024-25 season) 09/01/2024    INFLUENZA VACCINE (Season Ended) 09/01/2025    MEDICARE ANNUAL WELLNESS VISIT   "06/03/2026    ANNUAL REVIEW OF HM ORDERS  06/03/2026    FALL RISK ASSESSMENT  06/03/2026    ADVANCE CARE PLANNING  06/03/2030    PHQ-2 (once per calendar year)  Completed    PNEUMOCOCCAL VACCINE 50+ YEARS  Completed    URINALYSIS  Completed    HPV VACCINE  Aged Out    MENINGITIS VACCINE  Aged Out    LUNG CANCER SCREENING  Discontinued         Review of Systems  Constitutional, HEENT, cardiovascular, pulmonary, GI, , musculoskeletal, neuro, skin, endocrine and psych systems are negative, except as otherwise noted.     Objective    Exam  /84   Pulse 80   Temp 98.2  F (36.8  C)   Resp 16   Ht 1.721 m (5' 7.75\")   Wt 81.6 kg (180 lb)   SpO2 95%   BMI 27.57 kg/m     Estimated body mass index is 27.57 kg/m  as calculated from the following:    Height as of this encounter: 1.721 m (5' 7.75\").    Weight as of this encounter: 81.6 kg (180 lb).    Physical Exam  GENERAL: alert and no distress.  BMI 27.57.  EYES: Eyes grossly normal to inspection, PERRL and conjunctivae and sclerae normal  HENT: ear canals and TM's normal, nose and mouth without ulcers or lesions  NECK: no adenopathy, no asymmetry, masses, or scars  RESP: lungs clear to auscultation - no rales, rhonchi or wheezes  CV: regular rate and rhythm, normal S1 S2, no S3 or S4, no murmur, click or rub, no peripheral edema  ABDOMEN: soft, nontender, no hepatosplenomegaly, no masses and bowel sounds normal   (male):  deferred  RECTAL: deferred  MS: no gross musculoskeletal defects noted, no edema  SKIN: no suspicious lesions or rashes  NEURO: Normal strength and tone, mentation intact and speech normal  PSYCH: mentation appears normal, affect normal/bright        EXAM: CT ABDOMEN PELVIS W CONTRAST  LOCATION: [facility]  DATE: 6/28/2023     INDICATION: epigastric pain  COMPARISON: None.  TECHNIQUE: CT scan of the abdomen and pelvis was performed following injection of IV contrast. Multiplanar reformats were obtained. Dose " reduction techniques were used.  CONTRAST: ISOVUE 370 75 mL     FINDINGS:   LOWER CHEST: Normal.     HEPATOBILIARY: Gallbladder removed. Tiny amount of air within the intrahepatic bile ducts likely from previous sphincterotomy. Normal liver.     PANCREAS: Normal.     SPLEEN: Normal.     ADRENAL GLANDS: Normal.     KIDNEYS/BLADDER: A couple 2 mm nonobstructing stones left kidney. No hydronephrosis on either side. No other significant findings.     BOWEL: Moderate diverticulosis throughout the colon most prominent in the sigmoid colon. No acute inflammatory change. Normal appendix.     LYMPH NODES: Normal.     VASCULATURE: Unremarkable.     PELVIC ORGANS: Moderate prostatic hypertrophy.     MUSCULOSKELETAL: Normal.                                                                      IMPRESSION:   1.  No acute findings to explain the patient's pain.     2.  Moderate diffuse diverticulosis in the colon but nothing for diverticulitis.          6/3/2025   Mini Cog   Clock Draw Score 0 Abnormal   3 Item Recall 3 objects recalled   Mini Cog Total Score 3                    Signed Electronically by: Urban Barker MD

## 2025-06-06 ENCOUNTER — HOSPITAL ENCOUNTER (OUTPATIENT)
Dept: RADIOLOGY | Facility: CLINIC | Age: 80
Discharge: HOME | End: 2025-06-06
Attending: PODIATRIST | Admitting: PODIATRIST
Payer: MEDICARE

## 2025-06-06 DIAGNOSIS — M19.072 DJD (DEGENERATIVE JOINT DISEASE), ANKLE AND FOOT, LEFT: ICD-10-CM

## 2025-06-06 PROCEDURE — 250N000011 HC RX IP 250 OP 636: Performed by: PODIATRIST

## 2025-06-06 PROCEDURE — 77002 NEEDLE LOCALIZATION BY XRAY: CPT

## 2025-06-06 RX ORDER — IOPAMIDOL 612 MG/ML
15 INJECTION, SOLUTION INTRATHECAL ONCE
Status: COMPLETED | OUTPATIENT
Start: 2025-06-06 | End: 2025-06-06

## 2025-06-06 RX ADMIN — IOPAMIDOL 30 ML: 612 INJECTION, SOLUTION INTRAVENOUS at 09:14

## 2025-08-22 ENCOUNTER — TRANSFERRED RECORDS (OUTPATIENT)
Dept: HEALTH INFORMATION MANAGEMENT | Facility: CLINIC | Age: 80
End: 2025-08-22
Payer: MEDICARE

## (undated) RX ORDER — BUPIVACAINE HYDROCHLORIDE 2.5 MG/ML
INJECTION, SOLUTION EPIDURAL; INFILTRATION; INTRACAUDAL
Status: DISPENSED
Start: 2021-09-02

## (undated) RX ORDER — TRIAMCINOLONE ACETONIDE 40 MG/ML
INJECTION, SUSPENSION INTRA-ARTICULAR; INTRAMUSCULAR
Status: DISPENSED
Start: 2021-09-02